# Patient Record
Sex: MALE | Race: WHITE | Employment: UNEMPLOYED | URBAN - NONMETROPOLITAN AREA
[De-identification: names, ages, dates, MRNs, and addresses within clinical notes are randomized per-mention and may not be internally consistent; named-entity substitution may affect disease eponyms.]

---

## 2017-09-19 ENCOUNTER — HOSPITAL ENCOUNTER (OUTPATIENT)
Dept: LAB | Age: 62
Discharge: HOME | End: 2017-09-19
Attending: NURSE PRACTITIONER | Admitting: NURSE PRACTITIONER

## 2017-09-19 LAB
ALT SERPL-CCNC: 12 U/L (ref 10–40)
ANION GAP SERPL CALCULATED.3IONS-SCNC: 12 MMOL/L (ref 4–16)
AST SERPL-CCNC: 15 IU/L (ref 15–37)
BASOPHILS ABSOLUTE: 0 K/CU MM
BASOPHILS RELATIVE PERCENT: 0.2 % (ref 0–1)
BUN BLDV-MCNC: 13 MG/DL (ref 6–23)
CALCIUM SERPL-MCNC: 8.9 MG/DL (ref 8.3–10.6)
CHLORIDE BLD-SCNC: 107 MMOL/L (ref 99–110)
CHOLESTEROL, FASTING: 144 MG/DL
CO2: 24 MMOL/L (ref 21–32)
CREAT SERPL-MCNC: 1.2 MG/DL (ref 0.9–1.3)
DIFFERENTIAL TYPE: ABNORMAL
EOSINOPHILS ABSOLUTE: 0.2 K/CU MM
EOSINOPHILS RELATIVE PERCENT: 2.6 % (ref 0–3)
ESTIMATED AVERAGE GLUCOSE: 105 MG/DL
GFR AFRICAN AMERICAN: >60 ML/MIN/1.73M2
GFR NON-AFRICAN AMERICAN: >60 ML/MIN/1.73M2
GLUCOSE FASTING: 97 MG/DL (ref 70–99)
HBA1C MFR BLD: 5.3 % (ref 4.2–6.3)
HCT VFR BLD CALC: 47.6 % (ref 42–52)
HDLC SERPL-MCNC: 26 MG/DL
HEMOGLOBIN: 14.9 GM/DL (ref 13.5–18)
IMMATURE NEUTROPHIL %: 0.4 % (ref 0–0.43)
LDL CHOLESTEROL DIRECT: 87 MG/DL
LYMPHOCYTES ABSOLUTE: 2.3 K/CU MM
LYMPHOCYTES RELATIVE PERCENT: 27.8 % (ref 24–44)
MCH RBC QN AUTO: 30.7 PG (ref 27–31)
MCHC RBC AUTO-ENTMCNC: 31.3 % (ref 32–36)
MCV RBC AUTO: 97.9 FL (ref 78–100)
MONOCYTES ABSOLUTE: 0.6 K/CU MM
MONOCYTES RELATIVE PERCENT: 6.7 % (ref 0–4)
PDW BLD-RTO: 14.6 % (ref 11.7–14.9)
PLATELET # BLD: 222 K/CU MM (ref 140–440)
PMV BLD AUTO: 10.4 FL (ref 7.5–11.1)
POTASSIUM SERPL-SCNC: 4.2 MMOL/L (ref 3.5–5.1)
RBC # BLD: 4.86 M/CU MM (ref 4.6–6.2)
SEGMENTED NEUTROPHILS ABSOLUTE COUNT: 5.2 K/CU MM
SEGMENTED NEUTROPHILS RELATIVE PERCENT: 62.3 % (ref 36–66)
SODIUM BLD-SCNC: 143 MMOL/L (ref 135–145)
TOTAL IMMATURE NEUTOROPHIL: 0.03 K/CU MM
TRIGLYCERIDE, FASTING: 182 MG/DL
TSH HIGH SENSITIVITY: 2.2 UIU/ML (ref 0.27–4.2)
VITAMIN B-12: 345.4 PG/ML (ref 211–911)
WBC # BLD: 8.3 K/CU MM (ref 4–10.5)

## 2017-09-20 LAB — TESTOSTERONE TOTAL-MALE: 323

## 2017-10-30 ENCOUNTER — OFFICE VISIT (OUTPATIENT)
Dept: FAMILY MEDICINE CLINIC | Age: 62
End: 2017-10-30

## 2017-10-30 VITALS
HEIGHT: 71 IN | BODY MASS INDEX: 29.26 KG/M2 | HEART RATE: 69 BPM | SYSTOLIC BLOOD PRESSURE: 138 MMHG | RESPIRATION RATE: 16 BRPM | OXYGEN SATURATION: 98 % | WEIGHT: 209 LBS | DIASTOLIC BLOOD PRESSURE: 88 MMHG

## 2017-10-30 DIAGNOSIS — Z12.11 SCREENING FOR COLON CANCER: ICD-10-CM

## 2017-10-30 DIAGNOSIS — I10 ESSENTIAL HYPERTENSION: Primary | ICD-10-CM

## 2017-10-30 DIAGNOSIS — R60.0 LEG EDEMA, LEFT: ICD-10-CM

## 2017-10-30 DIAGNOSIS — J44.9 CHRONIC OBSTRUCTIVE PULMONARY DISEASE, UNSPECIFIED COPD TYPE (HCC): ICD-10-CM

## 2017-10-30 DIAGNOSIS — F09 COGNITIVE DYSFUNCTION: ICD-10-CM

## 2017-10-30 DIAGNOSIS — R79.89 LOW TESTOSTERONE IN MALE: ICD-10-CM

## 2017-10-30 DIAGNOSIS — F07.81 POST CONCUSSION SYNDROME: ICD-10-CM

## 2017-10-30 PROCEDURE — 99204 OFFICE O/P NEW MOD 45 MIN: CPT | Performed by: FAMILY MEDICINE

## 2017-10-30 RX ORDER — CLOPIDOGREL BISULFATE 75 MG/1
75 TABLET ORAL DAILY
COMMUNITY
End: 2017-11-08 | Stop reason: SDUPTHER

## 2017-10-30 RX ORDER — BUPROPION HYDROCHLORIDE 150 MG/1
150 TABLET ORAL EVERY MORNING
COMMUNITY
End: 2018-07-10 | Stop reason: SDUPTHER

## 2017-10-30 RX ORDER — PRAVASTATIN SODIUM 80 MG/1
80 TABLET ORAL DAILY
COMMUNITY
End: 2018-10-22 | Stop reason: SDUPTHER

## 2017-10-30 RX ORDER — BUSPIRONE HYDROCHLORIDE 15 MG/1
15 TABLET ORAL 3 TIMES DAILY
COMMUNITY
End: 2018-10-22 | Stop reason: SDUPTHER

## 2017-10-30 RX ORDER — BUDESONIDE AND FORMOTEROL FUMARATE DIHYDRATE 160; 4.5 UG/1; UG/1
2 AEROSOL RESPIRATORY (INHALATION) 2 TIMES DAILY
COMMUNITY
End: 2018-10-22 | Stop reason: SDUPTHER

## 2017-10-30 RX ORDER — TESTOSTERONE 12.5 MG/1.25G
5 GEL TOPICAL DAILY
COMMUNITY
End: 2018-01-02

## 2017-10-30 RX ORDER — GABAPENTIN 300 MG/1
300 CAPSULE ORAL 3 TIMES DAILY
COMMUNITY
End: 2018-10-22 | Stop reason: SDUPTHER

## 2017-10-30 RX ORDER — FLUTICASONE PROPIONATE 50 MCG
1 SPRAY, SUSPENSION (ML) NASAL DAILY
COMMUNITY
End: 2018-05-30 | Stop reason: SDUPTHER

## 2017-10-30 ASSESSMENT — ENCOUNTER SYMPTOMS
SORE THROAT: 0
RHINORRHEA: 0
SHORTNESS OF BREATH: 0
DIARRHEA: 0
COUGH: 0
ALLERGIC/IMMUNOLOGIC NEGATIVE: 1
NAUSEA: 0
CHEST TIGHTNESS: 0
SINUS PRESSURE: 0
WHEEZING: 0
BLOOD IN STOOL: 0
BACK PAIN: 0
VOMITING: 0
ABDOMINAL PAIN: 0
CONSTIPATION: 0

## 2017-10-30 ASSESSMENT — PATIENT HEALTH QUESTIONNAIRE - PHQ9
SUM OF ALL RESPONSES TO PHQ9 QUESTIONS 1 & 2: 0
1. LITTLE INTEREST OR PLEASURE IN DOING THINGS: 0
2. FEELING DOWN, DEPRESSED OR HOPELESS: 0
SUM OF ALL RESPONSES TO PHQ QUESTIONS 1-9: 0

## 2017-10-30 NOTE — ASSESSMENT & PLAN NOTE
Chronic left leg edema post what appears to have been fem-pop surgery.   This was done at [facility] in approximately 2013

## 2017-10-30 NOTE — ASSESSMENT & PLAN NOTE
/88   Pulse 69   Resp 16   Ht 5' 10.5\" (1.791 m)   Wt 209 lb (94.8 kg)   SpO2 98%   BMI 29.56 kg/m²

## 2017-10-30 NOTE — ASSESSMENT & PLAN NOTE
Patient has documented low testosterone for which she is on testosterone supplement.   He has low sexual desire which is interfering with his relationship with his wife
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
## 2017-11-08 RX ORDER — CLOPIDOGREL BISULFATE 75 MG/1
75 TABLET ORAL DAILY
Qty: 90 TABLET | Refills: 3 | Status: SHIPPED | OUTPATIENT
Start: 2017-11-08 | End: 2018-01-17 | Stop reason: SDUPTHER

## 2018-01-02 ENCOUNTER — OFFICE VISIT (OUTPATIENT)
Dept: FAMILY MEDICINE CLINIC | Age: 63
End: 2018-01-02

## 2018-01-02 VITALS
WEIGHT: 218.6 LBS | BODY MASS INDEX: 30.92 KG/M2 | DIASTOLIC BLOOD PRESSURE: 86 MMHG | HEART RATE: 77 BPM | RESPIRATION RATE: 16 BRPM | OXYGEN SATURATION: 98 % | SYSTOLIC BLOOD PRESSURE: 126 MMHG

## 2018-01-02 DIAGNOSIS — Z11.4 ENCOUNTER FOR SCREENING FOR HIV: ICD-10-CM

## 2018-01-02 DIAGNOSIS — Z11.59 ENCOUNTER FOR HEPATITIS C SCREENING TEST FOR LOW RISK PATIENT: ICD-10-CM

## 2018-01-02 DIAGNOSIS — I10 ESSENTIAL HYPERTENSION: Primary | ICD-10-CM

## 2018-01-02 DIAGNOSIS — F17.200 HAS BEEN SMOKING TOBACCO FOR 30 YEARS OR MORE: ICD-10-CM

## 2018-01-02 DIAGNOSIS — F07.81 POST CONCUSSION SYNDROME: ICD-10-CM

## 2018-01-02 DIAGNOSIS — R79.89 LOW TESTOSTERONE IN MALE: ICD-10-CM

## 2018-01-02 DIAGNOSIS — F43.10 PTSD (POST-TRAUMATIC STRESS DISORDER): ICD-10-CM

## 2018-01-02 DIAGNOSIS — R60.0 LEG EDEMA, LEFT: ICD-10-CM

## 2018-01-02 DIAGNOSIS — Z12.11 SCREENING FOR COLON CANCER: ICD-10-CM

## 2018-01-02 DIAGNOSIS — Z98.890 HISTORY OF VASCULAR SURGERY: ICD-10-CM

## 2018-01-02 DIAGNOSIS — F51.01 PRIMARY INSOMNIA: ICD-10-CM

## 2018-01-02 DIAGNOSIS — I10 ESSENTIAL HYPERTENSION: ICD-10-CM

## 2018-01-02 DIAGNOSIS — Z12.2 ENCOUNTER FOR SCREENING FOR LUNG CANCER: ICD-10-CM

## 2018-01-02 DIAGNOSIS — J44.9 CHRONIC OBSTRUCTIVE PULMONARY DISEASE, UNSPECIFIED COPD TYPE (HCC): ICD-10-CM

## 2018-01-02 LAB
A/G RATIO: 1.8 (ref 1.1–2.2)
ALBUMIN SERPL-MCNC: 4.4 G/DL (ref 3.4–5)
ALP BLD-CCNC: 69 U/L (ref 40–129)
ALT SERPL-CCNC: 17 U/L (ref 10–40)
ANION GAP SERPL CALCULATED.3IONS-SCNC: 13 MMOL/L (ref 3–16)
AST SERPL-CCNC: 16 U/L (ref 15–37)
BILIRUB SERPL-MCNC: 0.5 MG/DL (ref 0–1)
BUN BLDV-MCNC: 12 MG/DL (ref 7–20)
CALCIUM SERPL-MCNC: 9.7 MG/DL (ref 8.3–10.6)
CHLORIDE BLD-SCNC: 104 MMOL/L (ref 99–110)
CO2: 26 MMOL/L (ref 21–32)
CREAT SERPL-MCNC: 1 MG/DL (ref 0.8–1.3)
GFR AFRICAN AMERICAN: >60
GFR NON-AFRICAN AMERICAN: >60
GLOBULIN: 2.5 G/DL
GLUCOSE BLD-MCNC: 80 MG/DL (ref 70–99)
HEPATITIS C ANTIBODY INTERPRETATION: NORMAL
POTASSIUM SERPL-SCNC: 4.7 MMOL/L (ref 3.5–5.1)
SODIUM BLD-SCNC: 143 MMOL/L (ref 136–145)
TOTAL PROTEIN: 6.9 G/DL (ref 6.4–8.2)

## 2018-01-02 PROCEDURE — 99214 OFFICE O/P EST MOD 30 MIN: CPT | Performed by: FAMILY MEDICINE

## 2018-01-02 RX ORDER — DOXAZOSIN 2 MG/1
2 TABLET ORAL DAILY
Qty: 30 TABLET | Refills: 3 | Status: SHIPPED | OUTPATIENT
Start: 2018-01-02 | End: 2018-04-03 | Stop reason: SDUPTHER

## 2018-01-02 ASSESSMENT — ENCOUNTER SYMPTOMS
DIARRHEA: 0
VOMITING: 0
SINUS PRESSURE: 0
SORE THROAT: 0
CONSTIPATION: 0
BACK PAIN: 0
ALLERGIC/IMMUNOLOGIC NEGATIVE: 1
SHORTNESS OF BREATH: 0
BLOOD IN STOOL: 0
NAUSEA: 0
CHEST TIGHTNESS: 0
RHINORRHEA: 0
WHEEZING: 0
COUGH: 0
ABDOMINAL PAIN: 0

## 2018-01-02 NOTE — ASSESSMENT & PLAN NOTE
/86 (Site: Left Arm, Position: Sitting, Cuff Size: Medium Adult)   Pulse 77   Resp 16   Wt 218 lb 9.6 oz (99.2 kg)   SpO2 98%   BMI 30.92 kg/m²   Blood pressure well controlled
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
## 2018-01-02 NOTE — ASSESSMENT & PLAN NOTE
Patient was on testosterone patches but her that it the company is being sued for people having access heart attacks.   I pointed out that I had discussed this with him his last visit but he showed no concern about it but apparently the ads on TV by an  suing got his attention
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
## 2018-01-02 NOTE — ASSESSMENT & PLAN NOTE
Left leg edema post vascular grafting. Patient was following in [place] with [facility] but has lost contact with them and is requesting vascular surgery closer to home.   He will be set up down in [facility]

## 2018-01-02 NOTE — ASSESSMENT & PLAN NOTE
Patient is been smoking for close to 50 years a pack and half a day for much of that time so he has a greater than 60-pack-year history.   We will schedule a diagnostic low-dose CT chest as is currently recommended

## 2018-01-03 LAB — HIV-1 AND HIV-2 ANTIBODIES: NORMAL

## 2018-01-17 RX ORDER — CLOPIDOGREL BISULFATE 75 MG/1
75 TABLET ORAL DAILY
Qty: 90 TABLET | Refills: 3 | Status: SHIPPED | OUTPATIENT
Start: 2018-01-17 | End: 2018-10-22 | Stop reason: SDUPTHER

## 2018-02-05 PROBLEM — I73.9 PAD (PERIPHERAL ARTERY DISEASE) (HCC): Status: ACTIVE | Noted: 2018-02-05

## 2018-02-07 ENCOUNTER — HOSPITAL ENCOUNTER (OUTPATIENT)
Dept: WOUND CARE | Age: 63
Discharge: HOME | End: 2018-02-07
Attending: NURSE PRACTITIONER | Admitting: NURSE PRACTITIONER

## 2018-02-07 VITALS
WEIGHT: 223 LBS | TEMPERATURE: 97.6 F | BODY MASS INDEX: 31.92 KG/M2 | HEART RATE: 85 BPM | SYSTOLIC BLOOD PRESSURE: 130 MMHG | DIASTOLIC BLOOD PRESSURE: 74 MMHG | HEIGHT: 70 IN | RESPIRATION RATE: 18 BRPM

## 2018-02-07 DIAGNOSIS — I73.9 PAD (PERIPHERAL ARTERY DISEASE) (HCC): Primary | ICD-10-CM

## 2018-02-07 DIAGNOSIS — L97.922 CHRONIC ULCER OF LEFT LOWER EXTREMITY WITH FAT LAYER EXPOSED (HCC): ICD-10-CM

## 2018-02-07 DIAGNOSIS — I87.332 CHRONIC VENOUS HYPERTENSION (IDIOPATHIC) WITH ULCER AND INFLAMMATION OF LEFT LOWER EXTREMITY (HCC): ICD-10-CM

## 2018-02-07 DIAGNOSIS — L97.929 CHRONIC VENOUS HYPERTENSION (IDIOPATHIC) WITH ULCER AND INFLAMMATION OF LEFT LOWER EXTREMITY (HCC): ICD-10-CM

## 2018-02-07 PROCEDURE — 11042 DBRDMT SUBQ TIS 1ST 20SQCM/<: CPT | Performed by: NURSE PRACTITIONER

## 2018-02-07 PROCEDURE — 99203 OFFICE O/P NEW LOW 30 MIN: CPT | Performed by: NURSE PRACTITIONER

## 2018-02-07 ASSESSMENT — PAIN SCALES - GENERAL: PAINLEVEL_OUTOF10: 0

## 2018-02-07 NOTE — PROGRESS NOTES
(post-traumatic stress disorder)    Has been smoking tobacco for 30 years or more    PAD (peripheral artery disease) ([facility]    WD-Chronic ulcer of left lower extremity with fat layer exposed ([facility]    WD-Chronic venous hypertension (idiopathic) with ulcer and inflammation of left lower extremity (HCC)       REVIEW OF SYSTEMS    Constitutional: negative for chills, fatigue, fevers and malaise  Respiratory: negative for cough and shortness of breath  Cardiovascular: negative for chest pain and chest pressure/discomfort, negative for calf pain  Integument/breast: positive for skin lesion(s)  Neurological: negative for dizziness and headaches      Objective:      /74   Pulse 85   Temp 97.6 °F (36.4 °C) (Temporal)   Resp 18   Ht 5' 10\" (1.778 m)   Wt 223 lb (101.2 kg)   BMI 32.00 kg/m²     PHYSICAL EXAM  General Appearance: alert and oriented to person, place and time, well-developed and well-nourished, in no acute distress  Pulmonary/Chest: clear to auscultation bilaterally- no wheezes, rales or rhonchi, normal air movement, no respiratory distress  Cardiovascular: normal rate, normal S1 and S2 and intact distal pulses  Extremities: no cyanosis, no clubbing, Da's sign negative bilaterally and 2 + edema-  left lower leg  Dermatologic exam: Visual inspection of the periwound reveals the skin to be edematous  Wound exam: see wound description below in procedure note      Assessment:       Raciel Martini  appears to have a non-healing wound of the left lower leg. The etiology of the wound is felt to be venous and arterial. There are multiple complicating factors including edema, venous stasis and arterial insufficiency. A comprehensive wound management program would be helpful to heal this wound. Assessments completed include fall risk and nutritional, functional,and psychological status. At this time appropriate care would include: periodic debridement and wound care as below.      Problem List Items 2/7/2018  8:45 AM   Wound Assessment Red;Yellow 2/7/2018  8:45 AM   Drainage Amount Small 2/7/2018  8:45 AM   Drainage Description Serosanguinous 2/7/2018  8:45 AM   Odor None 2/7/2018  8:45 AM   Margins Defined edges 2/7/2018  8:45 AM   Maria Eugenia-wound Assessment Swelling;Red 2/7/2018  8:45 AM   Non-staged Wound Description Full thickness 2/7/2018  8:45 AM   [place]%Wound Bed 0 2/7/2018  8:45 AM   Red%Wound Bed 50 2/7/2018  8:45 AM   Yellow%Wound Bed 50 2/7/2018  8:45 AM   Black%Wound Bed 0 2/7/2018  8:45 AM   Purple%Wound Bed 0 2/7/2018  8:45 AM   Other%Wound Bed 0 2/7/2018  8:45 AM   Debridement per physician Subcutaneous 2/7/2018  9:15 AM   Number of days: 0       Percent of Wound(s) Debrided: 100%    Total  Area  Debrided:  5.28 sq cm     Bleeding:  None    Hemostasis Achieved:  not needed    Procedural Pain:  0  / 10     Post Procedural Pain:  0 / 10     Response to treatment:  Well tolerated by patient. Plan:     Discharge instructions:  PHYSICIAN ORDERS AND DISCHARGE INSTRUCTIONS    NOTE: Upon discharge from the [address], you will receive a patient experience survey. We would be grateful if you would take the time to fill this survey out.     Wound care order history:     JUAN CARLOS's: SHIELA AT THIS TIME D/T ARTERIAL DISEASE           Right       Left                                     Date    Vascular studies: ARTERIAL STUDIES ORDERED BY DR. Jeet Tiwari ON 2/5/2018, AWAITING APPOINTMENT  Date    Imaging:   Date    Cultures:  WOUND CULTURE LEFT MEDIAL LEG WOUND OBTAINED                                   Date: 2/7/2018   Labs/ HbA1c:   5.3 ON 9/17/2017                                             Date    Grafts:                                   Date    HBO:     Antibiotics:               Earlier Wound care treatments:                Authorizations:      Consults:   Date     Primary care physician:     Continuing wound care orders and information:              Residence:                Continue home health care with:

## 2018-02-07 NOTE — PLAN OF CARE
Problem: Arterial:  Goal: Optimize blood flow for wound healing  Optimize blood flow for wound healing   Outcome: Ongoing  Arterial studies ordered by Dr. Migel Christina

## 2018-02-11 LAB
CULTURE: NORMAL
ORGANISM: NORMAL
REPORT STATUS: NORMAL
REQUEST PROBLEM: NORMAL
SPECIMEN: NORMAL

## 2018-02-14 ENCOUNTER — HOSPITAL ENCOUNTER (OUTPATIENT)
Dept: GENERAL RADIOLOGY | Age: 63
Discharge: HOME | End: 2018-02-14
Attending: NURSE PRACTITIONER | Admitting: NURSE PRACTITIONER

## 2018-02-14 ENCOUNTER — HOSPITAL ENCOUNTER (OUTPATIENT)
Dept: WOUND CARE | Age: 63
Discharge: HOME | End: 2018-02-14
Attending: NURSE PRACTITIONER | Admitting: NURSE PRACTITIONER

## 2018-02-14 VITALS
DIASTOLIC BLOOD PRESSURE: 68 MMHG | HEART RATE: 79 BPM | RESPIRATION RATE: 17 BRPM | TEMPERATURE: 97.6 F | SYSTOLIC BLOOD PRESSURE: 107 MMHG

## 2018-02-14 DIAGNOSIS — L97.922 LEG ULCER, LEFT, WITH FAT LAYER EXPOSED (HCC): ICD-10-CM

## 2018-02-14 DIAGNOSIS — T81.89XD NONHEALING SURGICAL WOUND, SUBSEQUENT ENCOUNTER: ICD-10-CM

## 2018-02-14 DIAGNOSIS — I73.9 PAD (PERIPHERAL ARTERY DISEASE) (HCC): ICD-10-CM

## 2018-02-14 DIAGNOSIS — I87.332 CHRONIC VENOUS HTN W ULCER AND INFLAMMATION OF L LOW EXTREM (HCC): ICD-10-CM

## 2018-02-14 DIAGNOSIS — L97.922 CHRONIC ULCER OF LEFT LOWER EXTREMITY WITH FAT LAYER EXPOSED (HCC): ICD-10-CM

## 2018-02-14 DIAGNOSIS — L97.929 CHRONIC VENOUS HYPERTENSION (IDIOPATHIC) WITH ULCER AND INFLAMMATION OF LEFT LOWER EXTREMITY (HCC): Primary | ICD-10-CM

## 2018-02-14 DIAGNOSIS — I87.332 CHRONIC VENOUS HYPERTENSION (IDIOPATHIC) WITH ULCER AND INFLAMMATION OF LEFT LOWER EXTREMITY (HCC): Primary | ICD-10-CM

## 2018-02-14 DIAGNOSIS — L97.929 CHRONIC VENOUS HTN W ULCER AND INFLAMMATION OF L LOW EXTREM (HCC): ICD-10-CM

## 2018-02-14 PROCEDURE — 11042 DBRDMT SUBQ TIS 1ST 20SQCM/<: CPT | Performed by: NURSE PRACTITIONER

## 2018-02-14 RX ORDER — LIDOCAINE HYDROCHLORIDE 40 MG/ML
SOLUTION TOPICAL ONCE
Status: DISCONTINUED | OUTPATIENT
Start: 2018-02-14 | End: 2018-02-15 | Stop reason: HOSPADM

## 2018-02-14 ASSESSMENT — PAIN DESCRIPTION - DESCRIPTORS: DESCRIPTORS: THROBBING;TIGHTNESS

## 2018-02-14 ASSESSMENT — PAIN DESCRIPTION - PAIN TYPE: TYPE: CHRONIC PAIN

## 2018-02-14 ASSESSMENT — PAIN DESCRIPTION - ORIENTATION: ORIENTATION: LEFT

## 2018-02-14 ASSESSMENT — PAIN DESCRIPTION - LOCATION: LOCATION: LEG

## 2018-02-14 ASSESSMENT — PAIN DESCRIPTION - FREQUENCY: FREQUENCY: INTERMITTENT

## 2018-02-14 ASSESSMENT — PAIN DESCRIPTION - ONSET: ONSET: ON-GOING

## 2018-02-14 ASSESSMENT — PAIN SCALES - GENERAL: PAINLEVEL_OUTOF10: 5

## 2018-02-14 ASSESSMENT — PAIN DESCRIPTION - PROGRESSION: CLINICAL_PROGRESSION: NOT CHANGED

## 2018-02-14 NOTE — PROGRESS NOTES
Santyl, fibacol, 4x4, conform and tape applied to Left medial lower leg.
Measurements:  Wound 02/07/18 #1 LEFT MEDIAL LOWER LEG ARTERIAL ULCER (ONSET 2 WEEKS) (Active)   Wound Image   2/7/2018  8:45 AM   Wound Type Wound 2/7/2018  8:45 AM   Dressing Status Clean;Dry; Intact 2/14/2018 10:40 AM   Dressing Changed Changed/New 2/14/2018 10:40 AM   Wound Cleansed Rinsed/Irrigated with saline 2/14/2018  9:34 AM   Wound Length (cm) 3.4 cm 2/14/2018 10:10 AM   Wound Width (cm) 2.5 cm 2/14/2018 10:10 AM   Wound Depth (cm)  0.2 2/14/2018 10:10 AM   Calculated Wound Size (cm^2) (l*w) 8.5 cm^2 2/14/2018 10:10 AM   Change in Wound Size % (l*w) -60.98 2/14/2018 10:10 AM   Distance Tunneling (cm) 0 cm 2/14/2018  9:34 AM   Tunneling Position ___ O'Clock 0 2/14/2018  9:34 AM   Undermining Starts ___ O'Clock 0 2/14/2018  9:34 AM   Undermining Ends___ O'Clock 0 2/14/2018  9:34 AM   Undermining Maxium Distance (cm) 0 2/14/2018  9:34 AM   Wound Assessment Red;Yellow 2/14/2018  9:34 AM   Drainage Amount Moderate 2/14/2018  9:34 AM   Drainage Description Serosanguinous 2/14/2018  9:34 AM   Odor None 2/14/2018  9:34 AM   Margins Defined edges 2/14/2018  9:34 AM   Maria Eugenia-wound Assessment Swelling;Red 2/14/2018  9:34 AM   Non-staged Wound Description Full thickness 2/14/2018  9:34 AM   [place]%Wound Bed 0 2/14/2018  9:34 AM   Red%Wound Bed 10 2/14/2018  9:34 AM   Yellow%Wound Bed 90 2/14/2018  9:34 AM   Black%Wound Bed 0 2/14/2018  9:34 AM   Purple%Wound Bed 0 2/14/2018  9:34 AM   Other%Wound Bed 0 2/14/2018  9:34 AM   Debridement per physician Subcutaneous 2/14/2018 10:10 AM   Number of days: 7       Percent of Wound(s) Debrided: approximately 10%    Total  Area  Debrided:  0.8 sq cm     Bleeding:  None    Hemostasis Achieved:  not needed    Procedural Pain:  2  / 10     Post Procedural Pain:  1 / 10     Response to treatment:  Well tolerated by patient. Status of wound progress and description from last visit:   Worse with visible metal in the wound bed. Left leg continues to be swollen, red, and painful.  Patient

## 2018-02-14 NOTE — PLAN OF CARE
Problem: Pain:  Intervention: Opioid analgesia side-effects  See Flowsheet  Intervention: Assess barriers to pain control  See Flowsheet  Intervention: Promote participation in pain management plan  See Flowsheet    Goal: Pain level will decrease  Pain level will decrease   Outcome: Ongoing  See Flowsheet  Goal: Control of acute pain  Control of acute pain   Outcome: Ongoing  See Flowsheet  Goal: Control of chronic pain  Control of chronic pain   Outcome: Ongoing  See Flowsheet    Problem: Wound:  Intervention: Assess wound size, appearance and drainage  See Flowsheet  Intervention: Assess pedal pulses bilaterally if patient has a foot or leg ulcer  See Flowsheet    Goal: Will show signs of wound healing; wound closure and no evidence of infection  Will show signs of wound healing; wound closure and no evidence of infection   Outcome: Ongoing  See Flowsheet

## 2018-02-20 ENCOUNTER — HOSPITAL ENCOUNTER (OUTPATIENT)
Dept: WOUND CARE | Age: 63
Discharge: HOME | End: 2018-02-20
Attending: SURGERY | Admitting: SURGERY

## 2018-02-20 ENCOUNTER — HOSPITAL ENCOUNTER (OUTPATIENT)
Dept: GENERAL RADIOLOGY | Age: 63
Discharge: HOME | End: 2018-02-20
Attending: FAMILY MEDICINE | Admitting: FAMILY MEDICINE

## 2018-02-20 ENCOUNTER — HOSPITAL ENCOUNTER (OUTPATIENT)
Dept: CT IMAGING | Age: 63
Discharge: HOME | End: 2018-02-20
Attending: FAMILY MEDICINE | Admitting: FAMILY MEDICINE

## 2018-02-20 VITALS
DIASTOLIC BLOOD PRESSURE: 76 MMHG | SYSTOLIC BLOOD PRESSURE: 107 MMHG | TEMPERATURE: 97 F | RESPIRATION RATE: 16 BRPM | HEART RATE: 80 BPM

## 2018-02-20 DIAGNOSIS — Z12.2 ENCOUNTER FOR SCREENING FOR LUNG CANCER: ICD-10-CM

## 2018-02-20 DIAGNOSIS — L97.922 CHRONIC ULCER OF LEFT LOWER EXTREMITY WITH FAT LAYER EXPOSED (HCC): Primary | ICD-10-CM

## 2018-02-20 DIAGNOSIS — Z12.2 ENCOUNTER FOR SCREENING FOR MALIGNANT NEOPLASM OF RESPIRATORY ORGANS: ICD-10-CM

## 2018-02-20 DIAGNOSIS — F17.200 TOBACCO USE DISORDER: ICD-10-CM

## 2018-02-20 DIAGNOSIS — F17.200 NICOTINE DEPENDENCE, UNCOMPLICATED: ICD-10-CM

## 2018-02-20 RX ORDER — LISINOPRIL 20 MG/1
20 TABLET ORAL DAILY
COMMUNITY
End: 2018-04-03 | Stop reason: ALTCHOICE

## 2018-02-20 ASSESSMENT — PAIN DESCRIPTION - ONSET: ONSET: ON-GOING

## 2018-02-20 ASSESSMENT — PAIN SCALES - GENERAL: PAINLEVEL_OUTOF10: 5

## 2018-02-20 ASSESSMENT — PAIN DESCRIPTION - ORIENTATION: ORIENTATION: LEFT

## 2018-02-20 ASSESSMENT — PAIN DESCRIPTION - LOCATION: LOCATION: LEG

## 2018-02-20 ASSESSMENT — PAIN DESCRIPTION - FREQUENCY: FREQUENCY: INTERMITTENT

## 2018-02-20 ASSESSMENT — PAIN DESCRIPTION - PROGRESSION: CLINICAL_PROGRESSION: NOT CHANGED

## 2018-02-20 ASSESSMENT — PAIN DESCRIPTION - PAIN TYPE: TYPE: CHRONIC PAIN

## 2018-02-20 ASSESSMENT — PAIN DESCRIPTION - DESCRIPTORS: DESCRIPTORS: THROBBING

## 2018-02-20 NOTE — PLAN OF CARE
Problem: Wound:  Intervention: Assess wound size, appearance and drainage  See flowsheet  Intervention: Assess pedal pulses bilaterally if patient has a foot or leg ulcer  See flowsheet    Goal: Will show signs of wound healing; wound closure and no evidence of infection  Will show signs of wound healing; wound closure and no evidence of infection   Outcome: Ongoing

## 2018-02-20 NOTE — PROGRESS NOTES
tubi F)    Written Patient Dismissal Instructions Given            Electronically signed by Sarah Pan MD on 2/20/2018 at 11:28 AM

## 2018-02-27 ENCOUNTER — HOSPITAL ENCOUNTER (OUTPATIENT)
Dept: WOUND CARE | Age: 63
Discharge: HOME | End: 2018-02-27
Attending: SURGERY | Admitting: SURGERY

## 2018-02-27 VITALS
HEART RATE: 74 BPM | SYSTOLIC BLOOD PRESSURE: 127 MMHG | TEMPERATURE: 97.6 F | RESPIRATION RATE: 16 BRPM | DIASTOLIC BLOOD PRESSURE: 80 MMHG

## 2018-02-27 DIAGNOSIS — I73.9 PAD (PERIPHERAL ARTERY DISEASE) (HCC): ICD-10-CM

## 2018-02-27 DIAGNOSIS — L97.922 CHRONIC ULCER OF LEFT LOWER EXTREMITY WITH FAT LAYER EXPOSED (HCC): Primary | ICD-10-CM

## 2018-02-27 NOTE — PROGRESS NOTES
[Hydrocodone-Acetaminophen] Itching       MEDICATIONS    Current Outpatient Prescriptions on File Prior to Encounter   Medication Sig Dispense Refill    lisinopril (PRINIVIL;ZESTRIL) 20 MG tablet Take 20 mg by mouth daily      mupirocin (BACTROBAN) 2 % ointment Apply 3 times daily. 1 Tube 1    collagenase 250 UNIT/GM ointment Apply topically daily Apply topically daily.  clopidogrel (PLAVIX) 75 MG tablet Take 1 tablet by mouth daily 90 tablet 3    doxazosin (CARDURA) 2 MG tablet Take 1 tablet by mouth daily 30 tablet 3    tiotropium (SPIRIVA HANDIHALER) 18 MCG inhalation capsule Inhale 18 mcg into the lungs daily      pravastatin (PRAVACHOL) 80 MG tablet Take 80 mg by mouth daily      busPIRone (BUSPAR) 15 MG tablet Take 15 mg by mouth 3 times daily      budesonide-formoterol (SYMBICORT) 160-4.5 MCG/ACT AERO Inhale 2 puffs into the lungs 2 times daily      gabapentin (NEURONTIN) 300 MG capsule Take 300 mg by mouth 3 times daily      fluticasone (FLONASE) 50 MCG/ACT nasal spray 1 spray by Nasal route daily      buPROPion (WELLBUTRIN XL) 150 MG extended release tablet Take 150 mg by mouth every morning       No current facility-administered medications on file prior to encounter. REVIEW OF SYSTEMS    Pertinent items are noted in HPI. Constitutional: Negative for systemic symptoms including fever, chills and malaise. Objective:      /80   Pulse 74   Temp 97.6 °F (36.4 °C) (Temporal)   Resp 16     PHYSICAL EXAM      General: The patient is in no acute distress. Mental status:  Patient is appropriate, is  oriented to place and plan of care.   Dermatologic exam: Visual inspection of the periwound reveals the skin to be cool  and edematous  Wound exam: see wound description below in procedure note      Assessment:     Problem List Items Addressed This Visit     PAD (peripheral artery disease) ([facility] [address] [facility]    WD-Chronic ulcer of left lower extremity with fat layer exposed ([facility] [address] - Primary

## 2018-02-27 NOTE — PLAN OF CARE
Problem: Wound:  Intervention: Assess wound size, appearance and drainage  See flowsheet   Intervention: Assess pedal pulses bilaterally if patient has a foot or leg ulcer  See flowsheet     Goal: Will show signs of wound healing; wound closure and no evidence of infection  Will show signs of wound healing; wound closure and no evidence of infection   Outcome: Ongoing  See flowsheet

## 2018-03-01 ENCOUNTER — TELEPHONE (OUTPATIENT)
Dept: FAMILY MEDICINE CLINIC | Age: 63
End: 2018-03-01

## 2018-03-06 ENCOUNTER — HOSPITAL ENCOUNTER (OUTPATIENT)
Dept: WOUND CARE | Age: 63
Discharge: HOME | End: 2018-03-06
Attending: SURGERY | Admitting: NURSE PRACTITIONER

## 2018-03-06 VITALS
TEMPERATURE: 96.2 F | DIASTOLIC BLOOD PRESSURE: 67 MMHG | HEART RATE: 75 BPM | RESPIRATION RATE: 16 BRPM | SYSTOLIC BLOOD PRESSURE: 102 MMHG

## 2018-03-06 DIAGNOSIS — I87.332 CHRONIC VENOUS HYPERTENSION (IDIOPATHIC) WITH ULCER AND INFLAMMATION OF LEFT LOWER EXTREMITY (HCC): ICD-10-CM

## 2018-03-06 DIAGNOSIS — L97.929 CHRONIC VENOUS HYPERTENSION (IDIOPATHIC) WITH ULCER AND INFLAMMATION OF LEFT LOWER EXTREMITY (HCC): ICD-10-CM

## 2018-03-06 DIAGNOSIS — T81.89XD NONHEALING SURGICAL WOUND, SUBSEQUENT ENCOUNTER: Primary | ICD-10-CM

## 2018-03-06 DIAGNOSIS — L97.922 CHRONIC ULCER OF LEFT LOWER EXTREMITY WITH FAT LAYER EXPOSED (HCC): ICD-10-CM

## 2018-03-06 PROCEDURE — 11042 DBRDMT SUBQ TIS 1ST 20SQCM/<: CPT | Performed by: NURSE PRACTITIONER

## 2018-03-13 ENCOUNTER — HOSPITAL ENCOUNTER (OUTPATIENT)
Dept: WOUND CARE | Age: 63
Discharge: HOME | End: 2018-03-13
Attending: SURGERY | Admitting: SURGERY

## 2018-03-13 VITALS
RESPIRATION RATE: 16 BRPM | DIASTOLIC BLOOD PRESSURE: 59 MMHG | HEART RATE: 78 BPM | TEMPERATURE: 97.4 F | SYSTOLIC BLOOD PRESSURE: 90 MMHG

## 2018-03-13 DIAGNOSIS — L97.922 CHRONIC ULCER OF LEFT LOWER EXTREMITY WITH FAT LAYER EXPOSED (HCC): Primary | ICD-10-CM

## 2018-03-13 NOTE — PROGRESS NOTES
extent of previous healing. Performed by: Gene Bowser MD    Consent obtained: Yes    Time out taken:  Yes    Pain Control: None needed       Debridement:Excisional Debridement    Using #15 blade scalpel the wound(s) was/were sharply debrided down through and including the removal of subcutaneous tissue. Devitalized Tissue Debrided:  slough and exudate    Pre Debridement Measurements:  Are located in the Wound Documentation Flow Sheet    All active wounds listed below with today's date are evaluated  Wound(s)    debrided this date include # : 1     Post  Debridement Measurements:  Wound 02/07/18 #1 LEFT MEDIAL LOWER LEG ARTERIAL ULCER (ONSET 2 WEEKS) (Active)   Wound Image   2/20/2018  9:54 AM   Wound Type Wound 3/13/2018  9:59 AM   Wound Arterial 3/13/2018  9:59 AM   Dressing Status Clean;Dry; Intact 3/13/2018 10:47 AM   Dressing Changed Changed/New 3/13/2018 10:47 AM   Wound Cleansed Wound cleanser 3/13/2018  9:59 AM   Wound Length (cm) 4.1 cm 3/13/2018 10:41 AM   Wound Width (cm) 2.1 cm 3/13/2018 10:41 AM   Wound Depth (cm)  .2 3/13/2018 10:41 AM   Calculated Wound Size (cm^2) (l*w) 8.61 cm^2 3/13/2018 10:41 AM   Change in Wound Size % (l*w) -63.07 3/13/2018 10:41 AM   Distance Tunneling (cm) 0 cm 3/13/2018  9:59 AM   Tunneling Position ___ O'Clock 0 3/13/2018  9:59 AM   Undermining Starts ___ O'Clock 0 3/13/2018  9:59 AM   Undermining Ends___ O'Clock 0 3/13/2018  9:59 AM   Undermining Maxium Distance (cm) 0 3/13/2018  9:59 AM   Wound Assessment Red;Yellow 3/13/2018  9:59 AM   Drainage Amount Moderate 3/13/2018  9:59 AM   Drainage Description Serosanguinous 3/13/2018  9:59 AM   Odor None 3/13/2018  9:59 AM   Margins Defined edges 3/13/2018  9:59 AM   Maria Eugenia-wound Assessment Purple 3/13/2018  9:59 AM   Non-staged Wound Description Full thickness 3/13/2018  9:59 AM   [place]%Wound Bed 0 3/13/2018  9:59 AM   Red%Wound Bed 20 3/13/2018  9:59 AM   Yellow%Wound Bed 80 3/13/2018  9:59 AM   Black%Wound Bed 0 3/13/2018  9:59 AM   Purple%Wound Bed 0 3/13/2018  9:59 AM   Other%Wound Bed 0 3/13/2018  9:59 AM   Debridement per physician Subcutaneous 3/13/2018 10:41 AM   Number of days: 34       Percent of Wound(s) Debrided: approximately 50%    Total Surface Area Debrided:  4 sq cm     Bleeding:  Minimal    Hemostasis Achieved:  by pressure    Procedural Pain:  4  / 10     Post Procedural Pain:  0 / 10     Response to treatment:  Well tolerated by patient. Status of wound progress and description from last visit:   Moderately improved. Plan:       Discharge Instructions       PHYSICIAN ORDERS AND DISCHARGE INSTRUCTIONS     NOTE: Upon discharge from the [address], you will receive a patient experience survey. We would be grateful if you would take the time to fill this survey out.     Wound care order history:                 JUAN CARLOS's: SHIELA AT THIS TIME D/T ARTERIAL DISEASE           Right       Left                                              Date               Vascular studies: ARTERIAL STUDIES ORDERED BY DR. Ileana Grant ON 2/5/2018, AWAITING APPOINTMENT--not able to do currently due to pain in leg              Imaging: XRAY LEFT LEG ORDERED ON 2/14/2018--negative no osteo or metal--just vascular clips               Cultures:  WOUND CULTURE LEFT MEDIAL LEG WOUND OBTAINEDon 2/7/18              Reviewed with patient on 2/14/2018-positive multidrug resistant MRSA-sensitive to Doxycycline & prescribed                               Date:               Labs/ HbA1c:   5.3 ON 9/17/2017                                             Date               Grafts:                                   FB               HBO:                Antibiotics: 2/14/2018-Doxycycline 100mg twice daily for 10 days until completed              Earlier Wound care treatments:                Authorizations:                        Consults: 2/14/2018-REFERRED TO DR. James Bain [facility] physician:      Continuing wound care orders

## 2018-03-20 ENCOUNTER — HOSPITAL ENCOUNTER (OUTPATIENT)
Dept: WOUND CARE | Age: 63
Discharge: HOME | End: 2018-04-18
Attending: SURGERY | Admitting: SURGERY

## 2018-03-21 ENCOUNTER — TELEPHONE (OUTPATIENT)
Dept: FAMILY MEDICINE CLINIC | Age: 63
End: 2018-03-21

## 2018-03-21 DIAGNOSIS — Z12.11 SCREENING FOR COLON CANCER: ICD-10-CM

## 2018-03-21 LAB
CONTROL: PRESENT
HEMOCCULT STL QL: NEGATIVE

## 2018-03-22 ENCOUNTER — HOSPITAL ENCOUNTER (OUTPATIENT)
Dept: WOUND CARE | Age: 63
Discharge: HOME | End: 2018-03-22
Attending: INTERNAL MEDICINE | Admitting: INTERNAL MEDICINE

## 2018-03-22 VITALS
SYSTOLIC BLOOD PRESSURE: 124 MMHG | DIASTOLIC BLOOD PRESSURE: 80 MMHG | TEMPERATURE: 97.6 F | RESPIRATION RATE: 16 BRPM | HEART RATE: 71 BPM

## 2018-03-22 DIAGNOSIS — L97.922 CHRONIC ULCER OF LEFT LOWER EXTREMITY WITH FAT LAYER EXPOSED (HCC): Primary | ICD-10-CM

## 2018-03-22 DIAGNOSIS — I87.332 CHRONIC VENOUS HYPERTENSION (IDIOPATHIC) WITH ULCER AND INFLAMMATION OF LEFT LOWER EXTREMITY (HCC): ICD-10-CM

## 2018-03-22 DIAGNOSIS — L97.929 CHRONIC VENOUS HYPERTENSION (IDIOPATHIC) WITH ULCER AND INFLAMMATION OF LEFT LOWER EXTREMITY (HCC): ICD-10-CM

## 2018-03-22 RX ORDER — LIDOCAINE HYDROCHLORIDE 40 MG/ML
SOLUTION TOPICAL ONCE
Status: DISCONTINUED | OUTPATIENT
Start: 2018-03-22 | End: 2018-03-23 | Stop reason: HOSPADM

## 2018-03-22 ASSESSMENT — PAIN DESCRIPTION - PAIN TYPE: TYPE: CHRONIC PAIN

## 2018-03-22 ASSESSMENT — PAIN SCALES - GENERAL: PAINLEVEL_OUTOF10: 2

## 2018-03-22 ASSESSMENT — PAIN DESCRIPTION - ORIENTATION: ORIENTATION: LEFT

## 2018-03-22 ASSESSMENT — PAIN DESCRIPTION - FREQUENCY: FREQUENCY: CONTINUOUS

## 2018-03-22 ASSESSMENT — PAIN DESCRIPTION - ONSET: ONSET: ON-GOING

## 2018-03-22 ASSESSMENT — PAIN DESCRIPTION - LOCATION: LOCATION: LEG

## 2018-03-22 ASSESSMENT — PAIN DESCRIPTION - DESCRIPTORS: DESCRIPTORS: BURNING

## 2018-03-22 ASSESSMENT — PAIN DESCRIPTION - PROGRESSION: CLINICAL_PROGRESSION: NOT CHANGED

## 2018-03-22 NOTE — PROGRESS NOTES
Types: Cigarettes     Start date: 1/1/1972    Smokeless tobacco: Never Used    Alcohol use No      Comment: no etoh since 2015 ( hx heavy)       ALLERGIES    Allergies   Allergen Reactions    Vicodin [Hydrocodone-Acetaminophen] Itching       MEDICATIONS    Current Outpatient Prescriptions on File Prior to Encounter   Medication Sig Dispense Refill    lisinopril (PRINIVIL;ZESTRIL) 20 MG tablet Take 20 mg by mouth daily      clopidogrel (PLAVIX) 75 MG tablet Take 1 tablet by mouth daily 90 tablet 3    doxazosin (CARDURA) 2 MG tablet Take 1 tablet by mouth daily 30 tablet 3    tiotropium (SPIRIVA HANDIHALER) 18 MCG inhalation capsule Inhale 18 mcg into the lungs daily      pravastatin (PRAVACHOL) 80 MG tablet Take 80 mg by mouth daily      busPIRone (BUSPAR) 15 MG tablet Take 15 mg by mouth 3 times daily      budesonide-formoterol (SYMBICORT) 160-4.5 MCG/ACT AERO Inhale 2 puffs into the lungs 2 times daily      gabapentin (NEURONTIN) 300 MG capsule Take 300 mg by mouth 3 times daily      fluticasone (FLONASE) 50 MCG/ACT nasal spray 1 spray by Nasal route daily      buPROPion (WELLBUTRIN XL) 150 MG extended release tablet Take 150 mg by mouth every morning      collagenase 250 UNIT/GM ointment Apply topically daily Apply topically daily. No current facility-administered medications on file prior to encounter. REVIEW OF SYSTEMS    Pertinent items are noted in HPI. Constitutional: Negative for systemic symptoms including fever, chills and malaise. Objective:      /80   Pulse 71   Temp 97.6 °F (36.4 °C) (Temporal)   Resp 16     PHYSICAL EXAM      General: The patient is in no acute distress. Mental status:  Patient is appropriate, is  oriented to place and plan of care.   Dermatologic exam: Visual inspection of the periwound reveals the skin to be normal in turgor and texture  Wound exam: see wound description below in procedure note      Assessment:     Problem List Items  WOUND CULTURE LEFT MEDIAL LEG WOUND OBTAINEDon 2/7/18              Reviewed with patient on 2/14/2018-positive multidrug resistant MRSA-sensitive to Doxycycline & prescribed                               Date:               Labs/ HbA1c:   5.3 ON 9/17/2017                                             Date               Grafts:                                   XVRE               HBO:                Antibiotics: 2/14/2018-Doxycycline 100mg twice daily for 10 days until completed              Earlier Wound care treatments:                Authorizations:                        Consults: 2/14/2018-REFERRED TO DR. James Bain [facility] physician:      Continuing wound care orders and information:              Residence:                Continue home health care with:               Your wound-care supplies will be provided by: Krishna Broderick provider: [id number] with  Hilaria Officer loading:  Date               ZQJPK Medications: 2/7/2018--RX: SANTYL:APPLY NICKEL DEPTH LAYER TO WOUND BED DAILY              EFIJV cleansing:                           ON not scrub or use excessive force.                          Wash hands with soap and water before and after dressing changes.                         Prior to applying a clean dressing, cleanse wound with normal saline,                                wound cleanser, or mild soap and water.                           Ask the physician or nurse before getting the wound(s) wet in a shower              Daily Wound management:                          Keep weight off wounds and reposition every 2 hours.                          Avoid standing for long periods of time.                          Apply wraps/stockings in AM and remove at bedtime.                          If swelling is present, elevate legs to the level of the heart or above for 30 minutes 4-5 times a day and/or when sitting.

## 2018-03-22 NOTE — PROGRESS NOTES
Nonselective enzymatic debridement performed with Santyl per physician order to wound(s) of the Left medial lower leg  Patient tolerated the procedure well.

## 2018-03-27 ENCOUNTER — HOSPITAL ENCOUNTER (OUTPATIENT)
Dept: WOUND CARE | Age: 63
Discharge: HOME | End: 2018-03-27
Attending: NURSE PRACTITIONER | Admitting: NURSE PRACTITIONER

## 2018-03-27 VITALS
HEART RATE: 74 BPM | RESPIRATION RATE: 16 BRPM | SYSTOLIC BLOOD PRESSURE: 100 MMHG | TEMPERATURE: 97.4 F | DIASTOLIC BLOOD PRESSURE: 77 MMHG

## 2018-03-27 DIAGNOSIS — L97.929 CHRONIC VENOUS HYPERTENSION (IDIOPATHIC) WITH ULCER AND INFLAMMATION OF LEFT LOWER EXTREMITY (HCC): ICD-10-CM

## 2018-03-27 DIAGNOSIS — T81.89XD NONHEALING SURGICAL WOUND, SUBSEQUENT ENCOUNTER: ICD-10-CM

## 2018-03-27 DIAGNOSIS — L97.922 CHRONIC ULCER OF LEFT LOWER EXTREMITY WITH FAT LAYER EXPOSED (HCC): Primary | ICD-10-CM

## 2018-03-27 DIAGNOSIS — I87.332 CHRONIC VENOUS HYPERTENSION (IDIOPATHIC) WITH ULCER AND INFLAMMATION OF LEFT LOWER EXTREMITY (HCC): ICD-10-CM

## 2018-03-27 PROCEDURE — 11042 DBRDMT SUBQ TIS 1ST 20SQCM/<: CPT | Performed by: NURSE PRACTITIONER

## 2018-03-27 RX ORDER — LIDOCAINE HYDROCHLORIDE 40 MG/ML
SOLUTION TOPICAL ONCE
Status: DISCONTINUED | OUTPATIENT
Start: 2018-03-27 | End: 2018-03-28 | Stop reason: HOSPADM

## 2018-03-27 ASSESSMENT — PAIN DESCRIPTION - FREQUENCY: FREQUENCY: INTERMITTENT

## 2018-03-27 ASSESSMENT — PAIN DESCRIPTION - DESCRIPTORS: DESCRIPTORS: BURNING

## 2018-03-27 ASSESSMENT — PAIN DESCRIPTION - PAIN TYPE: TYPE: CHRONIC PAIN

## 2018-03-27 ASSESSMENT — PAIN DESCRIPTION - PROGRESSION: CLINICAL_PROGRESSION: NOT CHANGED

## 2018-03-27 ASSESSMENT — PAIN SCALES - GENERAL: PAINLEVEL_OUTOF10: 2

## 2018-03-27 ASSESSMENT — PAIN DESCRIPTION - LOCATION: LOCATION: LEG

## 2018-03-27 ASSESSMENT — PAIN DESCRIPTION - ORIENTATION: ORIENTATION: LEFT

## 2018-03-27 ASSESSMENT — PAIN DESCRIPTION - ONSET: ONSET: ON-GOING
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
## 2018-04-03 ENCOUNTER — OFFICE VISIT (OUTPATIENT)
Dept: FAMILY MEDICINE CLINIC | Age: 63
End: 2018-04-03

## 2018-04-03 ENCOUNTER — HOSPITAL ENCOUNTER (OUTPATIENT)
Dept: WOUND CARE | Age: 63
Discharge: HOME | End: 2018-04-03
Attending: SURGERY | Admitting: SURGERY

## 2018-04-03 VITALS
HEART RATE: 75 BPM | TEMPERATURE: 96.1 F | RESPIRATION RATE: 16 BRPM | SYSTOLIC BLOOD PRESSURE: 103 MMHG | DIASTOLIC BLOOD PRESSURE: 69 MMHG

## 2018-04-03 VITALS
RESPIRATION RATE: 16 BRPM | DIASTOLIC BLOOD PRESSURE: 68 MMHG | WEIGHT: 221.2 LBS | BODY MASS INDEX: 31.74 KG/M2 | SYSTOLIC BLOOD PRESSURE: 100 MMHG | HEART RATE: 79 BPM | OXYGEN SATURATION: 98 %

## 2018-04-03 DIAGNOSIS — L97.922 CHRONIC ULCER OF LEFT LOWER EXTREMITY WITH FAT LAYER EXPOSED (HCC): ICD-10-CM

## 2018-04-03 DIAGNOSIS — F43.10 PTSD (POST-TRAUMATIC STRESS DISORDER): ICD-10-CM

## 2018-04-03 DIAGNOSIS — F17.200 HAS BEEN SMOKING TOBACCO FOR 30 YEARS OR MORE: ICD-10-CM

## 2018-04-03 DIAGNOSIS — R39.9 LOWER URINARY TRACT SYMPTOMS (LUTS): ICD-10-CM

## 2018-04-03 DIAGNOSIS — I10 ESSENTIAL HYPERTENSION: ICD-10-CM

## 2018-04-03 DIAGNOSIS — F07.81 POST CONCUSSION SYNDROME: Primary | ICD-10-CM

## 2018-04-03 DIAGNOSIS — L97.929 CHRONIC VENOUS HYPERTENSION (IDIOPATHIC) WITH ULCER AND INFLAMMATION OF LEFT LOWER EXTREMITY (HCC): ICD-10-CM

## 2018-04-03 DIAGNOSIS — R79.89 LOW TESTOSTERONE IN MALE: ICD-10-CM

## 2018-04-03 DIAGNOSIS — I73.9 PAD (PERIPHERAL ARTERY DISEASE) (HCC): ICD-10-CM

## 2018-04-03 DIAGNOSIS — I87.332 CHRONIC VENOUS HYPERTENSION (IDIOPATHIC) WITH ULCER AND INFLAMMATION OF LEFT LOWER EXTREMITY (HCC): ICD-10-CM

## 2018-04-03 DIAGNOSIS — J44.9 CHRONIC OBSTRUCTIVE PULMONARY DISEASE, UNSPECIFIED COPD TYPE (HCC): ICD-10-CM

## 2018-04-03 DIAGNOSIS — L97.922 CHRONIC ULCER OF LEFT LOWER EXTREMITY WITH FAT LAYER EXPOSED (HCC): Primary | ICD-10-CM

## 2018-04-03 PROCEDURE — 99214 OFFICE O/P EST MOD 30 MIN: CPT | Performed by: FAMILY MEDICINE

## 2018-04-03 RX ORDER — DOXYCYCLINE HYCLATE 100 MG/1
CAPSULE ORAL
COMMUNITY
Start: 2018-02-14 | End: 2018-04-10 | Stop reason: ALTCHOICE

## 2018-04-03 RX ORDER — DOXAZOSIN MESYLATE 4 MG/1
4 TABLET ORAL DAILY
Qty: 30 TABLET | Refills: 3 | Status: SHIPPED | OUTPATIENT
Start: 2018-04-03 | End: 2018-08-16 | Stop reason: SDUPTHER

## 2018-04-03 ASSESSMENT — ENCOUNTER SYMPTOMS
CONSTIPATION: 0
BLOOD IN STOOL: 0
COUGH: 0
CHEST TIGHTNESS: 0
DIARRHEA: 0
WHEEZING: 0
BACK PAIN: 0
NAUSEA: 0
VOMITING: 0
SHORTNESS OF BREATH: 0
SINUS PRESSURE: 0
SORE THROAT: 0
RHINORRHEA: 0
ALLERGIC/IMMUNOLOGIC NEGATIVE: 1
ABDOMINAL PAIN: 0

## 2018-04-03 ASSESSMENT — PAIN DESCRIPTION - FREQUENCY: FREQUENCY: INTERMITTENT

## 2018-04-03 ASSESSMENT — PAIN DESCRIPTION - LOCATION: LOCATION: LEG

## 2018-04-03 ASSESSMENT — PAIN SCALES - GENERAL: PAINLEVEL_OUTOF10: 2

## 2018-04-03 ASSESSMENT — PAIN DESCRIPTION - DESCRIPTORS: DESCRIPTORS: BURNING

## 2018-04-03 ASSESSMENT — PAIN DESCRIPTION - ORIENTATION: ORIENTATION: LEFT

## 2018-04-03 ASSESSMENT — PAIN DESCRIPTION - ONSET: ONSET: ON-GOING

## 2018-04-03 ASSESSMENT — PAIN DESCRIPTION - PAIN TYPE: TYPE: CHRONIC PAIN

## 2018-04-10 ENCOUNTER — HOSPITAL ENCOUNTER (OUTPATIENT)
Dept: WOUND CARE | Age: 63
Discharge: HOME | End: 2018-04-10
Attending: SURGERY | Admitting: SURGERY

## 2018-04-10 VITALS
DIASTOLIC BLOOD PRESSURE: 72 MMHG | SYSTOLIC BLOOD PRESSURE: 107 MMHG | HEART RATE: 74 BPM | RESPIRATION RATE: 16 BRPM | TEMPERATURE: 97.2 F

## 2018-04-10 DIAGNOSIS — L97.922 CHRONIC ULCER OF LEFT LOWER EXTREMITY WITH FAT LAYER EXPOSED (HCC): Primary | ICD-10-CM

## 2018-04-10 ASSESSMENT — PAIN DESCRIPTION - ONSET: ONSET: ON-GOING

## 2018-04-10 ASSESSMENT — PAIN DESCRIPTION - PAIN TYPE: TYPE: CHRONIC PAIN

## 2018-04-10 ASSESSMENT — PAIN DESCRIPTION - PROGRESSION: CLINICAL_PROGRESSION: NOT CHANGED

## 2018-04-10 ASSESSMENT — PAIN DESCRIPTION - FREQUENCY: FREQUENCY: INTERMITTENT

## 2018-04-10 ASSESSMENT — PAIN DESCRIPTION - LOCATION: LOCATION: LEG

## 2018-04-10 ASSESSMENT — PAIN SCALES - GENERAL: PAINLEVEL_OUTOF10: 2

## 2018-04-10 ASSESSMENT — PAIN DESCRIPTION - ORIENTATION: ORIENTATION: LEFT

## 2018-04-10 ASSESSMENT — PAIN DESCRIPTION - DESCRIPTORS: DESCRIPTORS: ACHING

## 2018-04-12 PROBLEM — Z12.11 SCREENING FOR COLON CANCER: Status: RESOLVED | Noted: 2017-10-30 | Resolved: 2018-04-12

## 2018-04-17 ENCOUNTER — HOSPITAL ENCOUNTER (OUTPATIENT)
Dept: WOUND CARE | Age: 63
Discharge: HOME | End: 2018-04-17
Attending: SURGERY | Admitting: SURGERY

## 2018-04-17 VITALS
DIASTOLIC BLOOD PRESSURE: 74 MMHG | RESPIRATION RATE: 16 BRPM | SYSTOLIC BLOOD PRESSURE: 111 MMHG | TEMPERATURE: 97.1 F | HEART RATE: 76 BPM

## 2018-04-17 DIAGNOSIS — I73.9 PERIPHERAL VASCULAR DISEASE (HCC): ICD-10-CM

## 2018-04-17 DIAGNOSIS — L97.922 CHRONIC ULCER OF LEFT LOWER EXTREMITY WITH FAT LAYER EXPOSED (HCC): Primary | ICD-10-CM

## 2018-04-24 ENCOUNTER — HOSPITAL ENCOUNTER (OUTPATIENT)
Dept: WOUND CARE | Age: 63
Discharge: HOME | End: 2018-04-24
Attending: SURGERY | Admitting: SURGERY

## 2018-04-24 VITALS
HEART RATE: 86 BPM | TEMPERATURE: 97 F | SYSTOLIC BLOOD PRESSURE: 113 MMHG | DIASTOLIC BLOOD PRESSURE: 75 MMHG | RESPIRATION RATE: 18 BRPM

## 2018-04-24 DIAGNOSIS — I73.9 PERIPHERAL VASCULAR DISEASE (HCC): ICD-10-CM

## 2018-04-24 DIAGNOSIS — L97.922 CHRONIC ULCER OF LEFT LOWER EXTREMITY WITH FAT LAYER EXPOSED (HCC): Primary | ICD-10-CM

## 2018-04-24 DIAGNOSIS — I73.9 PAD (PERIPHERAL ARTERY DISEASE) (HCC): ICD-10-CM

## 2018-05-01 ENCOUNTER — HOSPITAL ENCOUNTER (OUTPATIENT)
Dept: WOUND CARE | Age: 63
Discharge: HOME | End: 2018-05-01
Attending: SURGERY | Admitting: SURGERY

## 2018-05-01 VITALS
RESPIRATION RATE: 16 BRPM | TEMPERATURE: 97.6 F | SYSTOLIC BLOOD PRESSURE: 112 MMHG | HEART RATE: 72 BPM | DIASTOLIC BLOOD PRESSURE: 72 MMHG

## 2018-05-01 DIAGNOSIS — I73.9 PAD (PERIPHERAL ARTERY DISEASE) (HCC): ICD-10-CM

## 2018-05-01 DIAGNOSIS — L97.922 CHRONIC ULCER OF LEFT LOWER EXTREMITY WITH FAT LAYER EXPOSED (HCC): Primary | ICD-10-CM

## 2018-05-08 ENCOUNTER — HOSPITAL ENCOUNTER (OUTPATIENT)
Dept: WOUND CARE | Age: 63
Discharge: HOME | End: 2018-05-08
Attending: SURGERY | Admitting: SURGERY

## 2018-05-08 VITALS
HEART RATE: 76 BPM | SYSTOLIC BLOOD PRESSURE: 125 MMHG | DIASTOLIC BLOOD PRESSURE: 78 MMHG | TEMPERATURE: 97.8 F | RESPIRATION RATE: 16 BRPM

## 2018-05-08 DIAGNOSIS — I73.9 PAD (PERIPHERAL ARTERY DISEASE) (HCC): ICD-10-CM

## 2018-05-08 DIAGNOSIS — L97.922 CHRONIC ULCER OF LEFT LOWER EXTREMITY WITH FAT LAYER EXPOSED (HCC): Primary | ICD-10-CM

## 2018-05-08 ASSESSMENT — PAIN DESCRIPTION - PROGRESSION: CLINICAL_PROGRESSION: NOT CHANGED

## 2018-05-08 ASSESSMENT — PAIN DESCRIPTION - DESCRIPTORS: DESCRIPTORS: THROBBING

## 2018-05-08 ASSESSMENT — PAIN DESCRIPTION - ONSET: ONSET: ON-GOING

## 2018-05-08 ASSESSMENT — PAIN SCALES - GENERAL: PAINLEVEL_OUTOF10: 2

## 2018-05-08 ASSESSMENT — PAIN DESCRIPTION - PAIN TYPE: TYPE: CHRONIC PAIN

## 2018-05-08 ASSESSMENT — PAIN DESCRIPTION - FREQUENCY: FREQUENCY: INTERMITTENT

## 2018-05-08 ASSESSMENT — PAIN DESCRIPTION - ORIENTATION: ORIENTATION: LEFT

## 2018-05-08 ASSESSMENT — PAIN DESCRIPTION - LOCATION: LOCATION: LEG

## 2018-05-17 ENCOUNTER — HOSPITAL ENCOUNTER (OUTPATIENT)
Dept: WOUND CARE | Age: 63
Discharge: HOME | End: 2018-05-17
Attending: INTERNAL MEDICINE | Admitting: INTERNAL MEDICINE

## 2018-05-17 VITALS
RESPIRATION RATE: 18 BRPM | SYSTOLIC BLOOD PRESSURE: 114 MMHG | HEART RATE: 70 BPM | DIASTOLIC BLOOD PRESSURE: 75 MMHG | TEMPERATURE: 96.7 F

## 2018-05-17 DIAGNOSIS — T81.89XD NONHEALING SURGICAL WOUND, SUBSEQUENT ENCOUNTER: Primary | ICD-10-CM

## 2018-05-17 DIAGNOSIS — L97.929 CHRONIC VENOUS HYPERTENSION (IDIOPATHIC) WITH ULCER AND INFLAMMATION OF LEFT LOWER EXTREMITY (HCC): ICD-10-CM

## 2018-05-17 DIAGNOSIS — L97.922 CHRONIC ULCER OF LEFT LOWER EXTREMITY WITH FAT LAYER EXPOSED (HCC): ICD-10-CM

## 2018-05-17 DIAGNOSIS — I87.332 CHRONIC VENOUS HYPERTENSION (IDIOPATHIC) WITH ULCER AND INFLAMMATION OF LEFT LOWER EXTREMITY (HCC): ICD-10-CM

## 2018-05-17 ASSESSMENT — PAIN DESCRIPTION - ONSET: ONSET: ON-GOING

## 2018-05-17 ASSESSMENT — PAIN DESCRIPTION - LOCATION: LOCATION: LEG

## 2018-05-17 ASSESSMENT — PAIN DESCRIPTION - DESCRIPTORS: DESCRIPTORS: PATIENT UNABLE TO DESCRIBE

## 2018-05-17 ASSESSMENT — PAIN DESCRIPTION - PROGRESSION: CLINICAL_PROGRESSION: NOT CHANGED

## 2018-05-17 ASSESSMENT — PAIN SCALES - GENERAL: PAINLEVEL_OUTOF10: 1

## 2018-05-17 ASSESSMENT — PAIN DESCRIPTION - PAIN TYPE: TYPE: CHRONIC PAIN

## 2018-05-17 ASSESSMENT — PAIN DESCRIPTION - ORIENTATION: ORIENTATION: LEFT

## 2018-05-17 ASSESSMENT — PAIN DESCRIPTION - FREQUENCY: FREQUENCY: INTERMITTENT

## 2018-05-22 ENCOUNTER — HOSPITAL ENCOUNTER (OUTPATIENT)
Dept: WOUND CARE | Age: 63
Discharge: HOME | End: 2018-06-20
Admitting: FAMILY MEDICINE

## 2018-05-22 ENCOUNTER — HOSPITAL ENCOUNTER (OUTPATIENT)
Dept: WOUND CARE | Age: 63
Discharge: HOME | End: 2018-05-22
Attending: SURGERY | Admitting: SURGERY

## 2018-05-22 VITALS
RESPIRATION RATE: 18 BRPM | TEMPERATURE: 97.6 F | DIASTOLIC BLOOD PRESSURE: 91 MMHG | SYSTOLIC BLOOD PRESSURE: 149 MMHG | HEART RATE: 73 BPM

## 2018-05-22 DIAGNOSIS — L97.922 CHRONIC ULCER OF LEFT LOWER EXTREMITY WITH FAT LAYER EXPOSED (HCC): Primary | ICD-10-CM

## 2018-05-29 ENCOUNTER — HOSPITAL ENCOUNTER (OUTPATIENT)
Dept: WOUND CARE | Age: 63
Discharge: HOME | End: 2018-05-29
Attending: SURGERY | Admitting: SURGERY

## 2018-05-29 VITALS
SYSTOLIC BLOOD PRESSURE: 102 MMHG | TEMPERATURE: 97.2 F | RESPIRATION RATE: 16 BRPM | HEART RATE: 62 BPM | DIASTOLIC BLOOD PRESSURE: 70 MMHG

## 2018-05-29 DIAGNOSIS — L97.922 CHRONIC ULCER OF LEFT LOWER EXTREMITY WITH FAT LAYER EXPOSED (HCC): Primary | ICD-10-CM

## 2018-05-29 ASSESSMENT — PAIN DESCRIPTION - DESCRIPTORS: DESCRIPTORS: BURNING

## 2018-05-29 ASSESSMENT — PAIN DESCRIPTION - PROGRESSION: CLINICAL_PROGRESSION: NOT CHANGED

## 2018-05-29 ASSESSMENT — PAIN DESCRIPTION - LOCATION: LOCATION: LEG

## 2018-05-29 ASSESSMENT — PAIN SCALES - GENERAL: PAINLEVEL_OUTOF10: 1

## 2018-05-29 ASSESSMENT — PAIN DESCRIPTION - ONSET: ONSET: ON-GOING

## 2018-05-29 ASSESSMENT — PAIN DESCRIPTION - ORIENTATION: ORIENTATION: LEFT

## 2018-05-29 ASSESSMENT — PAIN DESCRIPTION - PAIN TYPE: TYPE: CHRONIC PAIN

## 2018-05-29 ASSESSMENT — PAIN DESCRIPTION - FREQUENCY: FREQUENCY: INTERMITTENT

## 2018-05-30 ENCOUNTER — OFFICE VISIT (OUTPATIENT)
Dept: FAMILY MEDICINE CLINIC | Age: 63
End: 2018-05-30

## 2018-05-30 VITALS
DIASTOLIC BLOOD PRESSURE: 70 MMHG | HEART RATE: 67 BPM | SYSTOLIC BLOOD PRESSURE: 122 MMHG | WEIGHT: 220.4 LBS | OXYGEN SATURATION: 96 % | BODY MASS INDEX: 31.62 KG/M2 | RESPIRATION RATE: 20 BRPM

## 2018-05-30 DIAGNOSIS — Z72.0 TOBACCO ABUSE: ICD-10-CM

## 2018-05-30 DIAGNOSIS — L97.922 CHRONIC ULCER OF LEFT LOWER EXTREMITY WITH FAT LAYER EXPOSED (HCC): ICD-10-CM

## 2018-05-30 DIAGNOSIS — J00 NASOPHARYNGITIS: ICD-10-CM

## 2018-05-30 DIAGNOSIS — M79.605 LEFT LEG PAIN: Primary | ICD-10-CM

## 2018-05-30 DIAGNOSIS — F17.200 HAS BEEN SMOKING TOBACCO FOR 30 YEARS OR MORE: ICD-10-CM

## 2018-05-30 DIAGNOSIS — J44.9 CHRONIC OBSTRUCTIVE PULMONARY DISEASE, UNSPECIFIED COPD TYPE (HCC): ICD-10-CM

## 2018-05-30 DIAGNOSIS — I73.9 PAD (PERIPHERAL ARTERY DISEASE) (HCC): ICD-10-CM

## 2018-05-30 PROCEDURE — 99214 OFFICE O/P EST MOD 30 MIN: CPT | Performed by: FAMILY MEDICINE

## 2018-05-30 RX ORDER — FLUTICASONE PROPIONATE 50 MCG
1 SPRAY, SUSPENSION (ML) NASAL DAILY
Qty: 1 BOTTLE | Refills: 3 | Status: SHIPPED | OUTPATIENT
Start: 2018-05-30 | End: 2019-12-09 | Stop reason: ALTCHOICE

## 2018-05-30 RX ORDER — POLYETHYLENE GLYCOL 3350 17 G
2 POWDER IN PACKET (EA) ORAL PRN
Qty: 100 EACH | Refills: 3 | Status: SHIPPED | OUTPATIENT
Start: 2018-05-30 | End: 2019-12-09 | Stop reason: ALTCHOICE

## 2018-05-30 RX ORDER — NICOTINE 21 MG/24HR
1 PATCH, TRANSDERMAL 24 HOURS TRANSDERMAL EVERY 24 HOURS
Qty: 30 PATCH | Refills: 3 | Status: SHIPPED | OUTPATIENT
Start: 2018-05-30 | End: 2020-03-10 | Stop reason: ALTCHOICE

## 2018-05-30 ASSESSMENT — ENCOUNTER SYMPTOMS
COUGH: 1
EYES NEGATIVE: 1
HEMOPTYSIS: 0
SHORTNESS OF BREATH: 1
WHEEZING: 0
SPUTUM PRODUCTION: 1
ORTHOPNEA: 0
GASTROINTESTINAL NEGATIVE: 1

## 2018-06-04 ENCOUNTER — TELEPHONE (OUTPATIENT)
Dept: FAMILY MEDICINE CLINIC | Age: 63
End: 2018-06-04

## 2018-06-04 DIAGNOSIS — J44.1 COPD EXACERBATION (HCC): Primary | ICD-10-CM

## 2018-06-04 RX ORDER — SULFAMETHOXAZOLE AND TRIMETHOPRIM 800; 160 MG/1; MG/1
1 TABLET ORAL 2 TIMES DAILY
Qty: 14 TABLET | Refills: 0 | Status: SHIPPED | OUTPATIENT
Start: 2018-06-04 | End: 2018-06-05 | Stop reason: ALTCHOICE

## 2018-06-05 ENCOUNTER — HOSPITAL ENCOUNTER (OUTPATIENT)
Dept: WOUND CARE | Age: 63
Discharge: HOME | End: 2018-06-05
Attending: SURGERY | Admitting: SURGERY

## 2018-06-05 VITALS
SYSTOLIC BLOOD PRESSURE: 114 MMHG | DIASTOLIC BLOOD PRESSURE: 74 MMHG | HEART RATE: 74 BPM | RESPIRATION RATE: 16 BRPM | TEMPERATURE: 97.1 F

## 2018-06-05 DIAGNOSIS — L97.922 CHRONIC ULCER OF LEFT LOWER EXTREMITY WITH FAT LAYER EXPOSED (HCC): Primary | ICD-10-CM

## 2018-06-05 RX ORDER — SULFAMETHOXAZOLE AND TRIMETHOPRIM 800; 160 MG/1; MG/1
1 TABLET ORAL 2 TIMES DAILY
Qty: 14 TABLET | Refills: 0 | Status: SHIPPED | OUTPATIENT
Start: 2018-06-05 | End: 2018-06-12

## 2018-06-05 ASSESSMENT — PAIN DESCRIPTION - DESCRIPTORS: DESCRIPTORS: THROBBING

## 2018-06-05 ASSESSMENT — PAIN SCALES - GENERAL: PAINLEVEL_OUTOF10: 1

## 2018-06-05 ASSESSMENT — PAIN DESCRIPTION - PROGRESSION: CLINICAL_PROGRESSION: NOT CHANGED

## 2018-06-05 ASSESSMENT — PAIN DESCRIPTION - PAIN TYPE: TYPE: CHRONIC PAIN

## 2018-06-05 ASSESSMENT — PAIN DESCRIPTION - ONSET: ONSET: ON-GOING

## 2018-06-05 ASSESSMENT — PAIN DESCRIPTION - LOCATION: LOCATION: LEG

## 2018-06-05 ASSESSMENT — PAIN DESCRIPTION - ORIENTATION: ORIENTATION: LEFT;LOWER

## 2018-06-05 ASSESSMENT — PAIN DESCRIPTION - FREQUENCY: FREQUENCY: CONTINUOUS

## 2018-06-12 ENCOUNTER — HOSPITAL ENCOUNTER (OUTPATIENT)
Dept: WOUND CARE | Age: 63
Discharge: HOME | End: 2018-06-12
Attending: SURGERY | Admitting: SURGERY

## 2018-06-12 VITALS
TEMPERATURE: 97.3 F | HEART RATE: 76 BPM | RESPIRATION RATE: 16 BRPM | DIASTOLIC BLOOD PRESSURE: 75 MMHG | SYSTOLIC BLOOD PRESSURE: 112 MMHG

## 2018-06-12 DIAGNOSIS — I73.9 PAD (PERIPHERAL ARTERY DISEASE) (HCC): ICD-10-CM

## 2018-06-12 DIAGNOSIS — L97.922 CHRONIC ULCER OF LEFT LOWER EXTREMITY WITH FAT LAYER EXPOSED (HCC): Primary | ICD-10-CM

## 2018-06-12 ASSESSMENT — PAIN SCALES - GENERAL: PAINLEVEL_OUTOF10: 0

## 2018-06-26 ENCOUNTER — HOSPITAL ENCOUNTER (OUTPATIENT)
Dept: WOUND CARE | Age: 63
Discharge: HOME | End: 2018-06-26
Attending: SURGERY | Admitting: SURGERY

## 2018-06-26 DIAGNOSIS — I73.9 PAD (PERIPHERAL ARTERY DISEASE) (HCC): ICD-10-CM

## 2018-06-26 DIAGNOSIS — L97.922 CHRONIC ULCER OF LEFT LOWER EXTREMITY WITH FAT LAYER EXPOSED (HCC): Primary | ICD-10-CM

## 2018-07-03 ENCOUNTER — HOSPITAL ENCOUNTER (OUTPATIENT)
Dept: WOUND CARE | Age: 63
Discharge: HOME | End: 2018-07-03
Attending: SURGERY | Admitting: SURGERY

## 2018-07-03 VITALS
SYSTOLIC BLOOD PRESSURE: 122 MMHG | HEART RATE: 74 BPM | DIASTOLIC BLOOD PRESSURE: 82 MMHG | RESPIRATION RATE: 16 BRPM | TEMPERATURE: 97.7 F

## 2018-07-03 DIAGNOSIS — L97.922 CHRONIC ULCER OF LEFT LOWER EXTREMITY WITH FAT LAYER EXPOSED (HCC): Primary | ICD-10-CM

## 2018-07-03 NOTE — PROGRESS NOTES
healed. Plan:     Discharge Instructions       PHYSICIAN ORDERS AND DISCHARGE INSTRUCTIONS     NOTE: Upon discharge from the [address], you will receive a patient experience survey. We would be grateful if you would take the time to fill this survey out.     Wound care order history:                 JUAN CARLOS's: SHIELA AT THIS TIME D/T ARTERIAL DISEASE           Right       Left                                              Date               Vascular studies: ARTERIAL STUDIES ORDERED and planned for 5/2/18--Dr Jade Snell 5/21/18--he is requesting to do angeo--patient is unsure at this time              Imaging: XRAY LEFT LEG ORDERED ON 2/14/2018--negative no osteo or metal--just vascular clips               Cultures:  WOUND CULTURE LEFT MEDIAL LEG WOUND OBTAINEDon 2/7/18              Reviewed with patient on 2/14/2018-positive multidrug resistant MRSA-sensitive to Doxycycline & prescribed                               Date:               Labs/ HbA1c:   5.3 ON 9/17/2017                                             Date               Grafts:                                   POBO               HBO:                Antibiotics: 2/14/2018-Doxycycline 100mg twice daily for 10 days until completed              Earlier Wound care treatments:                Authorizations:                        Consults: 2/14/2018-REFERRED TO DR. James Smith chema [facility] physician:      Continuing wound care orders and information:              Residence:                Continue home health care with:               Your wound-care supplies will be provided by:  Rafal Dubon provider: [address] with  Michel Powell loading:  Date               FBLGW Medications: 2/7/2018--RX: SANTYL:APPLY NICKEL DEPTH LAYER TO WOUND BED DAILY              Wound cleansing:                           KW not scrub or use excessive force.                          Wash hands with soap and water before and after dressing changes.                         Prior to applying a clean dressing, cleanse wound with normal saline,                                wound cleanser, or mild soap and water.                           Ask the physician or nurse before getting the wound(s) wet in a shower              Daily Wound management:                          Keep weight off wounds and reposition every 2 hours.                          LQQTX standing for long periods of time.                          BBLYO wraps/stockings in AM and remove at bedtime.                          If swelling is present, elevate legs to the level of the heart or above for 30 minutes 4-5 times a day and/or when sitting.                                             When taking antibiotics take entire prescription as ordered by physician do not stop taking until medicine is all gone.                    Orders for this week: 7/3/2018          LEFT LOWER LEG: ---healed---    DISCHARGE FROM WOUND CLINIC    Follow up with [facility] wound clinic AS NEEDED     Call [phone] for any questions or concerns.   Date__________   Time____________              Treatment Note      Written Patient Dismissal Instructions Given            Electronically signed by Maureen Hills MD on 7/3/2018 at 12:37 PM

## 2018-07-11 RX ORDER — BUPROPION HYDROCHLORIDE 150 MG/1
150 TABLET ORAL EVERY MORNING
Qty: 90 TABLET | Refills: 3 | Status: SHIPPED | OUTPATIENT
Start: 2018-07-11 | End: 2019-08-31 | Stop reason: SDUPTHER

## 2018-08-16 DIAGNOSIS — F43.10 PTSD (POST-TRAUMATIC STRESS DISORDER): ICD-10-CM

## 2018-08-16 DIAGNOSIS — R39.9 LOWER URINARY TRACT SYMPTOMS (LUTS): ICD-10-CM

## 2018-08-16 RX ORDER — DOXAZOSIN MESYLATE 4 MG/1
4 TABLET ORAL DAILY
Qty: 30 TABLET | Refills: 3 | Status: SHIPPED | OUTPATIENT
Start: 2018-08-16 | End: 2018-10-22 | Stop reason: SDUPTHER

## 2018-10-03 RX ORDER — GABAPENTIN 300 MG/1
300 CAPSULE ORAL 3 TIMES DAILY
Qty: 90 CAPSULE | OUTPATIENT
Start: 2018-10-03

## 2018-10-22 ENCOUNTER — OFFICE VISIT (OUTPATIENT)
Dept: FAMILY MEDICINE CLINIC | Age: 63
End: 2018-10-22
Payer: MEDICARE

## 2018-10-22 VITALS
WEIGHT: 226 LBS | OXYGEN SATURATION: 95 % | SYSTOLIC BLOOD PRESSURE: 130 MMHG | BODY MASS INDEX: 31.64 KG/M2 | DIASTOLIC BLOOD PRESSURE: 84 MMHG | RESPIRATION RATE: 16 BRPM | HEIGHT: 71 IN | HEART RATE: 98 BPM

## 2018-10-22 DIAGNOSIS — N52.01 ERECTILE DYSFUNCTION DUE TO ARTERIAL INSUFFICIENCY: ICD-10-CM

## 2018-10-22 DIAGNOSIS — R07.89 CHEST WALL PAIN, CHRONIC: ICD-10-CM

## 2018-10-22 DIAGNOSIS — F43.10 PTSD (POST-TRAUMATIC STRESS DISORDER): ICD-10-CM

## 2018-10-22 DIAGNOSIS — I73.9 PAD (PERIPHERAL ARTERY DISEASE) (HCC): ICD-10-CM

## 2018-10-22 DIAGNOSIS — R09.81 SINUS CONGESTION: ICD-10-CM

## 2018-10-22 DIAGNOSIS — J44.9 CHRONIC OBSTRUCTIVE PULMONARY DISEASE, UNSPECIFIED COPD TYPE (HCC): Primary | ICD-10-CM

## 2018-10-22 DIAGNOSIS — Z72.0 TOBACCO ABUSE: ICD-10-CM

## 2018-10-22 DIAGNOSIS — I10 ESSENTIAL HYPERTENSION: ICD-10-CM

## 2018-10-22 DIAGNOSIS — Z23 NEEDS FLU SHOT: ICD-10-CM

## 2018-10-22 DIAGNOSIS — R39.9 LOWER URINARY TRACT SYMPTOMS (LUTS): ICD-10-CM

## 2018-10-22 DIAGNOSIS — G89.29 CHEST WALL PAIN, CHRONIC: ICD-10-CM

## 2018-10-22 PROBLEM — T81.89XD NONHEALING SURGICAL WOUND, SUBSEQUENT ENCOUNTER: Status: RESOLVED | Noted: 2018-02-14 | Resolved: 2018-10-22

## 2018-10-22 PROBLEM — I87.332 CHRONIC VENOUS HYPERTENSION (IDIOPATHIC) WITH ULCER AND INFLAMMATION OF LEFT LOWER EXTREMITY (HCC): Status: RESOLVED | Noted: 2018-02-07 | Resolved: 2018-10-22

## 2018-10-22 PROBLEM — L97.929 CHRONIC VENOUS HYPERTENSION (IDIOPATHIC) WITH ULCER AND INFLAMMATION OF LEFT LOWER EXTREMITY (HCC): Status: RESOLVED | Noted: 2018-02-07 | Resolved: 2018-10-22

## 2018-10-22 PROBLEM — L97.922 CHRONIC ULCER OF LEFT LOWER EXTREMITY WITH FAT LAYER EXPOSED (HCC): Status: RESOLVED | Noted: 2018-02-07 | Resolved: 2018-10-22

## 2018-10-22 PROCEDURE — 99214 OFFICE O/P EST MOD 30 MIN: CPT | Performed by: FAMILY MEDICINE

## 2018-10-22 PROCEDURE — 90688 IIV4 VACCINE SPLT 0.5 ML IM: CPT | Performed by: FAMILY MEDICINE

## 2018-10-22 PROCEDURE — G0008 ADMIN INFLUENZA VIRUS VAC: HCPCS | Performed by: FAMILY MEDICINE

## 2018-10-22 RX ORDER — PRAVASTATIN SODIUM 80 MG/1
80 TABLET ORAL DAILY
Qty: 90 TABLET | Refills: 3 | Status: SHIPPED | OUTPATIENT
Start: 2018-10-22 | End: 2019-10-24 | Stop reason: SDUPTHER

## 2018-10-22 RX ORDER — DOXAZOSIN MESYLATE 4 MG/1
4 TABLET ORAL DAILY
Qty: 90 TABLET | Refills: 3 | Status: SHIPPED | OUTPATIENT
Start: 2018-10-22 | End: 2019-11-14 | Stop reason: SDUPTHER

## 2018-10-22 RX ORDER — BUSPIRONE HYDROCHLORIDE 15 MG/1
15 TABLET ORAL 3 TIMES DAILY
Qty: 270 TABLET | Refills: 3 | Status: SHIPPED | OUTPATIENT
Start: 2018-10-22 | End: 2019-10-24 | Stop reason: SDUPTHER

## 2018-10-22 RX ORDER — GABAPENTIN 300 MG/1
300 CAPSULE ORAL 3 TIMES DAILY
Qty: 90 CAPSULE | Refills: 0 | Status: SHIPPED | OUTPATIENT
Start: 2018-10-22 | End: 2019-01-10 | Stop reason: SDUPTHER

## 2018-10-22 RX ORDER — SILDENAFIL CITRATE 20 MG/1
20 TABLET ORAL PRN
Qty: 30 TABLET | Refills: 3 | Status: SHIPPED | OUTPATIENT
Start: 2018-10-22 | End: 2019-03-19 | Stop reason: SDUPTHER

## 2018-10-22 RX ORDER — CLOPIDOGREL BISULFATE 75 MG/1
75 TABLET ORAL DAILY
Qty: 90 TABLET | Refills: 3 | Status: SHIPPED | OUTPATIENT
Start: 2018-10-22 | End: 2019-10-24 | Stop reason: SDUPTHER

## 2018-10-22 RX ORDER — BUDESONIDE AND FORMOTEROL FUMARATE DIHYDRATE 160; 4.5 UG/1; UG/1
2 AEROSOL RESPIRATORY (INHALATION) 2 TIMES DAILY
Qty: 3 INHALER | Refills: 3 | Status: SHIPPED | OUTPATIENT
Start: 2018-10-22 | End: 2019-09-19 | Stop reason: SDUPTHER

## 2018-10-22 RX ORDER — IPRATROPIUM BROMIDE 21 UG/1
2 SPRAY, METERED NASAL EVERY 12 HOURS
Qty: 3 BOTTLE | Refills: 3 | Status: SHIPPED | OUTPATIENT
Start: 2018-10-22 | End: 2019-09-19 | Stop reason: SDUPTHER

## 2018-10-22 ASSESSMENT — PATIENT HEALTH QUESTIONNAIRE - PHQ9
2. FEELING DOWN, DEPRESSED OR HOPELESS: 0
1. LITTLE INTEREST OR PLEASURE IN DOING THINGS: 0
SUM OF ALL RESPONSES TO PHQ QUESTIONS 1-9: 0
SUM OF ALL RESPONSES TO PHQ QUESTIONS 1-9: 0
SUM OF ALL RESPONSES TO PHQ9 QUESTIONS 1 & 2: 0

## 2018-10-22 NOTE — PROGRESS NOTES
10/22/2018     Estefany Mosquera (:  1955) is a 66 y.o. male, here for evaluation of the following medical concerns:    1.nose frequently clogs, bilateral, day and night. flonase dries out sinuses. COPD:  Current treatment includes short-acting beta agonist inhaler, anticholinergic inhaler. Residual symptoms: chronic dyspnea. He denies chest tightness, chest pain, sore throat, weight loss. He requires his rescue inhaler 1 time(s) per day. Review of Systems    Prior to Visit Medications    Medication Sig Taking? Authorizing Provider   pravastatin (PRAVACHOL) 80 MG tablet Take 1 tablet by mouth daily Yes Mike Pina MD   doxazosin (CARDURA) 4 MG tablet Take 1 tablet by mouth daily Yes Mike Pina MD   gabapentin (NEURONTIN) 300 MG capsule Take 1 capsule by mouth 3 times daily for 90 days. Ness Gomes MD   budesonide-formoterol (SYMBICORT) 160-4.5 MCG/ACT AERO Inhale 2 puffs into the lungs 2 times daily Yes Mike Pina MD   clopidogrel (PLAVIX) 75 MG tablet Take 1 tablet by mouth daily Yes Mike Pina MD   busPIRone (BUSPAR) 15 MG tablet Take 15 mg by mouth 3 times daily Yes Mike Pina MD   tiotropium (Kelsy Goodie) 18 MCG inhalation capsule Inhale 1 capsule into the lungs daily Yes Mike Pina MD   ipratropium (ATROVENT) 0.03 % nasal spray 2 sprays by Nasal route every 12 hours Yes Mike Pina MD   diclofenac sodium 1 % GEL Apply 2 g topically 2 times daily Yes Mike Pina MD   sildenafil (REVATIO) 20 MG tablet Take 1 tablet by mouth as needed (ED) Yes Mike Pina MD   buPROPion (WELLBUTRIN XL) 150 MG extended release tablet Take 1 tablet by mouth every morning Yes Mike Pina MD   UNABLE TO FIND wheelchair Yes Mike Pina MD   nicotine (NICODERM CQ) 21 MG/24HR Place 1 patch onto the skin every 24 hours  Mike Pina MD   nicotine polacrilex (COMMIT) 2 MG lozenge Take 1 lozenge by mouth as needed for Smoking

## 2019-01-10 DIAGNOSIS — G89.29 CHEST WALL PAIN, CHRONIC: ICD-10-CM

## 2019-01-10 DIAGNOSIS — R07.89 CHEST WALL PAIN, CHRONIC: ICD-10-CM

## 2019-01-10 RX ORDER — GABAPENTIN 300 MG/1
300 CAPSULE ORAL 3 TIMES DAILY
Qty: 90 CAPSULE | Refills: 0 | Status: SHIPPED | OUTPATIENT
Start: 2019-01-10 | End: 2019-04-12 | Stop reason: SDUPTHER

## 2019-03-19 ENCOUNTER — OFFICE VISIT (OUTPATIENT)
Dept: FAMILY MEDICINE CLINIC | Age: 64
End: 2019-03-19
Payer: MEDICARE

## 2019-03-19 VITALS
RESPIRATION RATE: 16 BRPM | HEART RATE: 90 BPM | SYSTOLIC BLOOD PRESSURE: 126 MMHG | TEMPERATURE: 97.9 F | WEIGHT: 214.38 LBS | BODY MASS INDEX: 30.01 KG/M2 | DIASTOLIC BLOOD PRESSURE: 80 MMHG | HEIGHT: 71 IN

## 2019-03-19 DIAGNOSIS — I73.9 PAD (PERIPHERAL ARTERY DISEASE) (HCC): ICD-10-CM

## 2019-03-19 DIAGNOSIS — F17.200 HAS BEEN SMOKING TOBACCO FOR 30 YEARS OR MORE: ICD-10-CM

## 2019-03-19 DIAGNOSIS — I10 ESSENTIAL HYPERTENSION: Primary | ICD-10-CM

## 2019-03-19 DIAGNOSIS — N52.01 ERECTILE DYSFUNCTION DUE TO ARTERIAL INSUFFICIENCY: ICD-10-CM

## 2019-03-19 DIAGNOSIS — F07.81 POST CONCUSSION SYNDROME: ICD-10-CM

## 2019-03-19 DIAGNOSIS — Z87.891 PERSONAL HISTORY OF TOBACCO USE: ICD-10-CM

## 2019-03-19 DIAGNOSIS — Z72.0 TOBACCO ABUSE: ICD-10-CM

## 2019-03-19 DIAGNOSIS — Z23 NEED FOR PROPHYLACTIC VACCINATION AGAINST DIPHTHERIA-TETANUS-PERTUSSIS (DTP): ICD-10-CM

## 2019-03-19 DIAGNOSIS — F43.10 PTSD (POST-TRAUMATIC STRESS DISORDER): ICD-10-CM

## 2019-03-19 DIAGNOSIS — Z12.11 SCREENING FOR COLON CANCER: ICD-10-CM

## 2019-03-19 LAB
A/G RATIO: 1.6 (ref 1.1–2.2)
ALBUMIN SERPL-MCNC: 4.6 G/DL (ref 3.4–5)
ALP BLD-CCNC: 74 U/L (ref 40–129)
ALT SERPL-CCNC: 14 U/L (ref 10–40)
ANION GAP SERPL CALCULATED.3IONS-SCNC: 14 MMOL/L (ref 3–16)
AST SERPL-CCNC: 14 U/L (ref 15–37)
BILIRUB SERPL-MCNC: 0.5 MG/DL (ref 0–1)
BUN BLDV-MCNC: 14 MG/DL (ref 7–20)
CALCIUM SERPL-MCNC: 9.8 MG/DL (ref 8.3–10.6)
CHLORIDE BLD-SCNC: 104 MMOL/L (ref 99–110)
CHOLESTEROL, TOTAL: 158 MG/DL (ref 0–199)
CO2: 25 MMOL/L (ref 21–32)
CREAT SERPL-MCNC: 1.4 MG/DL (ref 0.8–1.3)
GFR AFRICAN AMERICAN: >60
GFR NON-AFRICAN AMERICAN: 51
GLOBULIN: 2.8 G/DL
GLUCOSE BLD-MCNC: 96 MG/DL (ref 70–99)
HDLC SERPL-MCNC: 31 MG/DL (ref 40–60)
LDL CHOLESTEROL CALCULATED: 94 MG/DL
POTASSIUM SERPL-SCNC: 5 MMOL/L (ref 3.5–5.1)
SODIUM BLD-SCNC: 143 MMOL/L (ref 136–145)
TOTAL PROTEIN: 7.4 G/DL (ref 6.4–8.2)
TRIGL SERPL-MCNC: 163 MG/DL (ref 0–150)
VLDLC SERPL CALC-MCNC: 33 MG/DL

## 2019-03-19 PROCEDURE — 36415 COLL VENOUS BLD VENIPUNCTURE: CPT | Performed by: FAMILY MEDICINE

## 2019-03-19 PROCEDURE — G0296 VISIT TO DETERM LDCT ELIG: HCPCS | Performed by: FAMILY MEDICINE

## 2019-03-19 PROCEDURE — 99214 OFFICE O/P EST MOD 30 MIN: CPT | Performed by: FAMILY MEDICINE

## 2019-03-19 RX ORDER — SILDENAFIL CITRATE 20 MG/1
20 TABLET ORAL PRN
Qty: 30 TABLET | Refills: 3 | Status: SHIPPED | OUTPATIENT
Start: 2019-03-19 | End: 2019-12-09 | Stop reason: SDUPTHER

## 2019-03-19 RX ORDER — CARBAMAZEPINE 200 MG/1
200 TABLET ORAL NIGHTLY
Qty: 90 TABLET | Refills: 3 | Status: SHIPPED | OUTPATIENT
Start: 2019-03-19 | End: 2019-12-09 | Stop reason: ALTCHOICE

## 2019-03-19 ASSESSMENT — ENCOUNTER SYMPTOMS
SINUS PRESSURE: 0
COUGH: 0
WHEEZING: 0
BACK PAIN: 0
NAUSEA: 0
CHEST TIGHTNESS: 0
RHINORRHEA: 0
DIARRHEA: 0
VOMITING: 0
SHORTNESS OF BREATH: 0
ALLERGIC/IMMUNOLOGIC NEGATIVE: 1
ABDOMINAL PAIN: 0
CONSTIPATION: 0
SORE THROAT: 0
BLOOD IN STOOL: 0

## 2019-03-19 ASSESSMENT — PATIENT HEALTH QUESTIONNAIRE - PHQ9
2. FEELING DOWN, DEPRESSED OR HOPELESS: 0
SUM OF ALL RESPONSES TO PHQ9 QUESTIONS 1 & 2: 0
SUM OF ALL RESPONSES TO PHQ QUESTIONS 1-9: 0
1. LITTLE INTEREST OR PLEASURE IN DOING THINGS: 0
SUM OF ALL RESPONSES TO PHQ QUESTIONS 1-9: 0

## 2019-03-20 ENCOUNTER — TELEPHONE (OUTPATIENT)
Dept: FAMILY MEDICINE CLINIC | Age: 64
End: 2019-03-20

## 2019-03-20 RX ORDER — AMOXICILLIN 500 MG/1
500 CAPSULE ORAL 3 TIMES DAILY
Qty: 30 CAPSULE | Refills: 0 | Status: SHIPPED | OUTPATIENT
Start: 2019-03-20 | End: 2019-03-30

## 2019-03-22 DIAGNOSIS — N28.9 FUNCTION KIDNEY DECREASED: Primary | ICD-10-CM

## 2019-04-08 ENCOUNTER — TELEPHONE (OUTPATIENT)
Dept: FAMILY MEDICINE CLINIC | Age: 64
End: 2019-04-08

## 2019-04-08 NOTE — TELEPHONE ENCOUNTER
Increase fluids as darker urine is generally just due to dehydration.   Unless he feels he can't urinate well doxazosin is likely doing the job  This is a very mild renal change and I don't believe we need to do anything other than just repeat the lab work is planned

## 2019-04-08 NOTE — TELEPHONE ENCOUNTER
Spoke to pt and advised of dr Jenny Jolly instructions/recommendations; pt acknowledged understanding.

## 2019-04-08 NOTE — TELEPHONE ENCOUNTER
Pt states labs showed decreased Kidney Function, he is wondering if Doxazosin Rx is still ok, or if he should take alternate therapy. He also c/o stronger (foul) smell to urine and darker color. Please advise.

## 2019-04-12 DIAGNOSIS — R07.89 CHEST WALL PAIN, CHRONIC: ICD-10-CM

## 2019-04-12 DIAGNOSIS — G89.29 CHEST WALL PAIN, CHRONIC: ICD-10-CM

## 2019-04-12 RX ORDER — GABAPENTIN 300 MG/1
300 CAPSULE ORAL 3 TIMES DAILY
Qty: 90 CAPSULE | Refills: 2 | Status: SHIPPED | OUTPATIENT
Start: 2019-04-12 | End: 2019-08-09 | Stop reason: ALTCHOICE

## 2019-08-09 ENCOUNTER — OFFICE VISIT (OUTPATIENT)
Dept: FAMILY MEDICINE CLINIC | Age: 64
End: 2019-08-09
Payer: MEDICARE

## 2019-08-09 VITALS
HEART RATE: 67 BPM | RESPIRATION RATE: 16 BRPM | BODY MASS INDEX: 28.46 KG/M2 | OXYGEN SATURATION: 99 % | SYSTOLIC BLOOD PRESSURE: 128 MMHG | DIASTOLIC BLOOD PRESSURE: 76 MMHG | WEIGHT: 201.2 LBS

## 2019-08-09 DIAGNOSIS — Z72.0 TOBACCO ABUSE: ICD-10-CM

## 2019-08-09 DIAGNOSIS — Z12.11 SCREENING FOR COLON CANCER: ICD-10-CM

## 2019-08-09 DIAGNOSIS — F51.01 PRIMARY INSOMNIA: ICD-10-CM

## 2019-08-09 DIAGNOSIS — J44.9 CHRONIC OBSTRUCTIVE PULMONARY DISEASE, UNSPECIFIED COPD TYPE (HCC): ICD-10-CM

## 2019-08-09 DIAGNOSIS — F43.10 PTSD (POST-TRAUMATIC STRESS DISORDER): ICD-10-CM

## 2019-08-09 DIAGNOSIS — N18.30 CKD (CHRONIC KIDNEY DISEASE), STAGE III (HCC): ICD-10-CM

## 2019-08-09 DIAGNOSIS — I10 ESSENTIAL HYPERTENSION: Primary | ICD-10-CM

## 2019-08-09 DIAGNOSIS — F07.81 POST CONCUSSION SYNDROME: ICD-10-CM

## 2019-08-09 LAB
CONTROL: NORMAL
HEMOCCULT STL QL: NEGATIVE

## 2019-08-09 PROCEDURE — G0444 DEPRESSION SCREEN ANNUAL: HCPCS | Performed by: FAMILY MEDICINE

## 2019-08-09 PROCEDURE — 82274 ASSAY TEST FOR BLOOD FECAL: CPT | Performed by: FAMILY MEDICINE

## 2019-08-09 PROCEDURE — 36415 COLL VENOUS BLD VENIPUNCTURE: CPT | Performed by: FAMILY MEDICINE

## 2019-08-09 PROCEDURE — 99214 OFFICE O/P EST MOD 30 MIN: CPT | Performed by: FAMILY MEDICINE

## 2019-08-09 ASSESSMENT — ENCOUNTER SYMPTOMS
DIARRHEA: 0
WHEEZING: 0
NAUSEA: 0
SHORTNESS OF BREATH: 0
BACK PAIN: 0
ALLERGIC/IMMUNOLOGIC NEGATIVE: 1
BLOOD IN STOOL: 0
VOMITING: 0
SORE THROAT: 0
RHINORRHEA: 0
CONSTIPATION: 0
COUGH: 0
ABDOMINAL PAIN: 0
CHEST TIGHTNESS: 0
SINUS PRESSURE: 0

## 2019-08-09 ASSESSMENT — PATIENT HEALTH QUESTIONNAIRE - PHQ9
SUM OF ALL RESPONSES TO PHQ9 QUESTIONS 1 & 2: 4
SUM OF ALL RESPONSES TO PHQ QUESTIONS 1-9: 8
4. FEELING TIRED OR HAVING LITTLE ENERGY: 2
1. LITTLE INTEREST OR PLEASURE IN DOING THINGS: 2
10. IF YOU CHECKED OFF ANY PROBLEMS, HOW DIFFICULT HAVE THESE PROBLEMS MADE IT FOR YOU TO DO YOUR WORK, TAKE CARE OF THINGS AT HOME, OR GET ALONG WITH OTHER PEOPLE: 0
7. TROUBLE CONCENTRATING ON THINGS, SUCH AS READING THE NEWSPAPER OR WATCHING TELEVISION: 2
SUM OF ALL RESPONSES TO PHQ QUESTIONS 1-9: 8
6. FEELING BAD ABOUT YOURSELF - OR THAT YOU ARE A FAILURE OR HAVE LET YOURSELF OR YOUR FAMILY DOWN: 0
5. POOR APPETITE OR OVEREATING: 0
3. TROUBLE FALLING OR STAYING ASLEEP: 0
9. THOUGHTS THAT YOU WOULD BE BETTER OFF DEAD, OR OF HURTING YOURSELF: 0
2. FEELING DOWN, DEPRESSED OR HOPELESS: 2
8. MOVING OR SPEAKING SO SLOWLY THAT OTHER PEOPLE COULD HAVE NOTICED. OR THE OPPOSITE, BEING SO FIGETY OR RESTLESS THAT YOU HAVE BEEN MOVING AROUND A LOT MORE THAN USUAL: 0

## 2019-08-10 LAB
ANION GAP SERPL CALCULATED.3IONS-SCNC: 15 MMOL/L (ref 3–16)
BUN BLDV-MCNC: 9 MG/DL (ref 7–20)
CALCIUM SERPL-MCNC: 10.2 MG/DL (ref 8.3–10.6)
CHLORIDE BLD-SCNC: 104 MMOL/L (ref 99–110)
CO2: 25 MMOL/L (ref 21–32)
CREAT SERPL-MCNC: 1.1 MG/DL (ref 0.8–1.3)
GFR AFRICAN AMERICAN: >60
GFR NON-AFRICAN AMERICAN: >60
GLUCOSE BLD-MCNC: 86 MG/DL (ref 70–99)
POTASSIUM SERPL-SCNC: 4.9 MMOL/L (ref 3.5–5.1)
SODIUM BLD-SCNC: 144 MMOL/L (ref 136–145)

## 2019-09-03 RX ORDER — BUPROPION HYDROCHLORIDE 150 MG/1
TABLET ORAL
Qty: 90 TABLET | Refills: 4 | Status: SHIPPED | OUTPATIENT
Start: 2019-09-03 | End: 2020-03-10 | Stop reason: SDUPTHER

## 2019-09-19 DIAGNOSIS — J44.9 CHRONIC OBSTRUCTIVE PULMONARY DISEASE, UNSPECIFIED COPD TYPE (HCC): ICD-10-CM

## 2019-09-19 DIAGNOSIS — R09.81 SINUS CONGESTION: ICD-10-CM

## 2019-09-19 RX ORDER — BUDESONIDE AND FORMOTEROL FUMARATE DIHYDRATE 160; 4.5 UG/1; UG/1
AEROSOL RESPIRATORY (INHALATION)
Qty: 1 INHALER | Refills: 4 | Status: SHIPPED | OUTPATIENT
Start: 2019-09-19 | End: 2020-01-15

## 2019-09-19 RX ORDER — IPRATROPIUM BROMIDE 21 UG/1
SPRAY, METERED NASAL
Qty: 90 ML | Refills: 0 | Status: SHIPPED | OUTPATIENT
Start: 2019-09-19 | End: 2019-11-13 | Stop reason: SDUPTHER

## 2019-10-24 DIAGNOSIS — I73.9 PAD (PERIPHERAL ARTERY DISEASE) (HCC): ICD-10-CM

## 2019-10-24 DIAGNOSIS — F43.10 PTSD (POST-TRAUMATIC STRESS DISORDER): ICD-10-CM

## 2019-10-24 RX ORDER — CLOPIDOGREL BISULFATE 75 MG/1
TABLET ORAL
Qty: 90 TABLET | Refills: 4 | Status: SHIPPED | OUTPATIENT
Start: 2019-10-24 | End: 2020-03-10 | Stop reason: SDUPTHER

## 2019-10-24 RX ORDER — BUSPIRONE HYDROCHLORIDE 15 MG/1
TABLET ORAL
Qty: 270 TABLET | Refills: 4 | Status: SHIPPED | OUTPATIENT
Start: 2019-10-24 | End: 2020-03-10 | Stop reason: SDUPTHER

## 2019-10-24 RX ORDER — PRAVASTATIN SODIUM 80 MG/1
TABLET ORAL
Qty: 90 TABLET | Refills: 4 | Status: SHIPPED | OUTPATIENT
Start: 2019-10-24 | End: 2020-03-10 | Stop reason: SDUPTHER

## 2019-11-05 ENCOUNTER — TELEPHONE (OUTPATIENT)
Dept: FAMILY MEDICINE CLINIC | Age: 64
End: 2019-11-05

## 2019-11-05 DIAGNOSIS — F43.10 PTSD (POST-TRAUMATIC STRESS DISORDER): ICD-10-CM

## 2019-11-13 DIAGNOSIS — F43.10 PTSD (POST-TRAUMATIC STRESS DISORDER): ICD-10-CM

## 2019-11-13 DIAGNOSIS — G89.29 CHEST WALL PAIN, CHRONIC: ICD-10-CM

## 2019-11-13 DIAGNOSIS — R09.81 SINUS CONGESTION: ICD-10-CM

## 2019-11-13 DIAGNOSIS — R07.89 CHEST WALL PAIN, CHRONIC: ICD-10-CM

## 2019-11-13 DIAGNOSIS — R39.9 LOWER URINARY TRACT SYMPTOMS (LUTS): ICD-10-CM

## 2019-11-13 DIAGNOSIS — J44.9 CHRONIC OBSTRUCTIVE PULMONARY DISEASE, UNSPECIFIED COPD TYPE (HCC): ICD-10-CM

## 2019-11-14 RX ORDER — IPRATROPIUM BROMIDE 21 UG/1
2 SPRAY, METERED NASAL 2 TIMES DAILY
Qty: 90 ML | Refills: 0 | Status: SHIPPED | OUTPATIENT
Start: 2019-11-14 | End: 2020-01-15

## 2019-11-14 RX ORDER — DOXAZOSIN MESYLATE 4 MG/1
4 TABLET ORAL DAILY
Qty: 90 TABLET | Refills: 3 | Status: SHIPPED | OUTPATIENT
Start: 2019-11-14 | End: 2020-03-10 | Stop reason: SDUPTHER

## 2019-11-14 RX ORDER — GABAPENTIN 300 MG/1
300 CAPSULE ORAL 3 TIMES DAILY
Qty: 90 CAPSULE | Refills: 2 | OUTPATIENT
Start: 2019-11-14 | End: 2020-02-12

## 2019-12-09 ENCOUNTER — OFFICE VISIT (OUTPATIENT)
Dept: FAMILY MEDICINE CLINIC | Age: 64
End: 2019-12-09
Payer: MEDICARE

## 2019-12-09 VITALS
DIASTOLIC BLOOD PRESSURE: 86 MMHG | BODY MASS INDEX: 30.58 KG/M2 | HEART RATE: 76 BPM | SYSTOLIC BLOOD PRESSURE: 122 MMHG | WEIGHT: 216.2 LBS | RESPIRATION RATE: 16 BRPM

## 2019-12-09 DIAGNOSIS — R07.89 CHEST WALL PAIN, CHRONIC: Primary | ICD-10-CM

## 2019-12-09 DIAGNOSIS — Z72.0 TOBACCO ABUSE: ICD-10-CM

## 2019-12-09 DIAGNOSIS — F43.10 PTSD (POST-TRAUMATIC STRESS DISORDER): ICD-10-CM

## 2019-12-09 DIAGNOSIS — G89.29 CHEST WALL PAIN, CHRONIC: Primary | ICD-10-CM

## 2019-12-09 DIAGNOSIS — N52.01 ERECTILE DYSFUNCTION DUE TO ARTERIAL INSUFFICIENCY: ICD-10-CM

## 2019-12-09 DIAGNOSIS — J44.9 CHRONIC OBSTRUCTIVE PULMONARY DISEASE, UNSPECIFIED COPD TYPE (HCC): ICD-10-CM

## 2019-12-09 DIAGNOSIS — I10 ESSENTIAL HYPERTENSION: ICD-10-CM

## 2019-12-09 PROCEDURE — 99214 OFFICE O/P EST MOD 30 MIN: CPT | Performed by: FAMILY MEDICINE

## 2019-12-09 RX ORDER — SILDENAFIL CITRATE 20 MG/1
20 TABLET ORAL PRN
Qty: 30 TABLET | Refills: 3 | Status: SHIPPED | OUTPATIENT
Start: 2019-12-09 | End: 2020-03-10 | Stop reason: SDUPTHER

## 2019-12-09 RX ORDER — GABAPENTIN 300 MG/1
300 CAPSULE ORAL 3 TIMES DAILY
Qty: 90 CAPSULE | Refills: 2 | Status: SHIPPED | OUTPATIENT
Start: 2019-12-09 | End: 2020-03-10 | Stop reason: SDUPTHER

## 2019-12-09 ASSESSMENT — ENCOUNTER SYMPTOMS
ALLERGIC/IMMUNOLOGIC NEGATIVE: 1
SHORTNESS OF BREATH: 1
WHEEZING: 0
SINUS PRESSURE: 0
CONSTIPATION: 0
BACK PAIN: 0
NAUSEA: 0
RHINORRHEA: 0
CHEST TIGHTNESS: 0
BLOOD IN STOOL: 0
ABDOMINAL PAIN: 0
SORE THROAT: 0
DIARRHEA: 0
VOMITING: 0
COUGH: 0

## 2019-12-09 ASSESSMENT — PATIENT HEALTH QUESTIONNAIRE - PHQ9
SUM OF ALL RESPONSES TO PHQ QUESTIONS 1-9: 0
SUM OF ALL RESPONSES TO PHQ9 QUESTIONS 1 & 2: 0
1. LITTLE INTEREST OR PLEASURE IN DOING THINGS: 0
SUM OF ALL RESPONSES TO PHQ QUESTIONS 1-9: 0
2. FEELING DOWN, DEPRESSED OR HOPELESS: 0

## 2020-01-15 RX ORDER — BUDESONIDE AND FORMOTEROL FUMARATE DIHYDRATE 160; 4.5 UG/1; UG/1
AEROSOL RESPIRATORY (INHALATION)
Qty: 10.2 G | Refills: 10 | Status: SHIPPED | OUTPATIENT
Start: 2020-01-15 | End: 2020-03-10 | Stop reason: SDUPTHER

## 2020-01-15 RX ORDER — IPRATROPIUM BROMIDE 21 UG/1
SPRAY, METERED NASAL
Qty: 90 ML | Refills: 10 | Status: SHIPPED | OUTPATIENT
Start: 2020-01-15 | End: 2020-12-21 | Stop reason: SDUPTHER

## 2020-01-27 ENCOUNTER — CARE COORDINATION (OUTPATIENT)
Dept: CARE COORDINATION | Age: 65
End: 2020-01-27

## 2020-01-31 ENCOUNTER — CARE COORDINATION (OUTPATIENT)
Dept: CARE COORDINATION | Age: 65
End: 2020-01-31

## 2020-02-03 ENCOUNTER — CARE COORDINATION (OUTPATIENT)
Dept: CARE COORDINATION | Age: 65
End: 2020-02-03

## 2020-02-03 NOTE — CARE COORDINATION
Ambulatory Care Coordination Note  CM Risk Score: 2  Charlson 10 Year Mortality Risk Score: 47%     ACC: Miguel George RN    Summary Note: Received call from pt following receipt of letter from [facility]. Agreeable to [facility] outreach and completing assessment on next call. Concerned about finding new provider, advised pcp will still be available for next apptmt on 3/10 and pt plans to call number on back of insurance card to make sure new provider is in network. Denies symptoms/needs at this time, advised to call if any arise. Ambulatory Care Coordination Assessment    Care Coordination Protocol  Program Enrollment:  Rising Risk  Referral from Primary Care Provider:  No  Week 1 - Initial Assessment                             Suggested Interventions and Community Resources                  Prior to Admission medications    Medication Sig Start Date End Date Taking? Authorizing Provider   SYMBICORT 160-4.5 MCG/ACT AERO INHALE 2 PUFFS BY MOUTH TWICE DAILY 1/15/20   Mckenna Hernandez MD   ipratropium (ATROVENT) 0.03 % nasal spray USE 2 SPRAYS INTRANASALLY TWICE DAILY 1/15/20   Mckenna Hernandez MD   VENTOLIN  (90 Base) MCG/ACT inhaler INHALE TWO (2) PUFFS BY MOUTH EVERY 4 TO 6 HOURS AS NEEDED 12/17/19   Mckenna Hernandez MD   gabapentin (NEURONTIN) 300 MG capsule Take 1 capsule by mouth 3 times daily for 90 days.  12/9/19 3/8/20  Mckenna Hernandez MD   sildenafil (REVATIO) 20 MG tablet Take 1 tablet by mouth as needed (ED) 12/9/19   Mckenna Hernandez MD   doxazosin (CARDURA) 4 MG tablet Take 1 tablet by mouth daily 11/14/19   Mckenna Hernandez MD   tiotropium (Janeece Bark) 18 MCG inhalation capsule Inhale 1 capsule into the lungs daily 11/14/19   Mckenna Hernandez MD   busPIRone (BUSPAR) 15 MG tablet TAKE 1 TABLET THREE TIMES A DAY 10/24/19   Mckenna Hernandez MD   pravastatin (PRAVACHOL) 80 MG tablet TAKE 1 TABLET DAILY 10/24/19   Mckenna Hernandez MD   clopidogrel (PLAVIX) 75 MG tablet TAKE 1 TABLET DAILY 10/24/19   Ky Harris MD   buPROPion (WELLBUTRIN XL) 150 MG extended release tablet TAKE 1 TABLET EVERY MORNING 9/3/19   Ky Harris MD   nicotine (NICODERM CQ) 21 MG/24HR Place 1 patch onto the skin every 24 hours 5/30/18 5/30/19  Ky Harris MD   collagenase 250 UNIT/GM ointment Apply topically daily Apply topically daily.  2/7/18   Historical Provider, MD       Future Appointments   Date Time Provider Antony Jen   3/10/2020  1:00 PM Ky Harris MD [facility]      and   General Assessment    Do you have any symptoms that are causing concern?:  No

## 2020-03-06 ENCOUNTER — CARE COORDINATION (OUTPATIENT)
Dept: CARE COORDINATION | Age: 65
End: 2020-03-06

## 2020-03-06 SDOH — HEALTH STABILITY: PHYSICAL HEALTH: ON AVERAGE, HOW MANY MINUTES DO YOU ENGAGE IN EXERCISE AT THIS LEVEL?: 0 MIN

## 2020-03-06 SDOH — ECONOMIC STABILITY: FOOD INSECURITY: WITHIN THE PAST 12 MONTHS, YOU WORRIED THAT YOUR FOOD WOULD RUN OUT BEFORE YOU GOT MONEY TO BUY MORE.: SOMETIMES TRUE

## 2020-03-06 SDOH — ECONOMIC STABILITY: TRANSPORTATION INSECURITY
IN THE PAST 12 MONTHS, HAS LACK OF TRANSPORTATION KEPT YOU FROM MEETINGS, WORK, OR FROM GETTING THINGS NEEDED FOR DAILY LIVING?: NO

## 2020-03-06 SDOH — HEALTH STABILITY: PHYSICAL HEALTH: ON AVERAGE, HOW MANY DAYS PER WEEK DO YOU ENGAGE IN MODERATE TO STRENUOUS EXERCISE (LIKE A BRISK WALK)?: 0 DAYS

## 2020-03-06 SDOH — ECONOMIC STABILITY: INCOME INSECURITY: HOW HARD IS IT FOR YOU TO PAY FOR THE VERY BASICS LIKE FOOD, HOUSING, MEDICAL CARE, AND HEATING?: SOMEWHAT HARD

## 2020-03-06 SDOH — HEALTH STABILITY: MENTAL HEALTH
STRESS IS WHEN SOMEONE FEELS TENSE, NERVOUS, ANXIOUS, OR CAN'T SLEEP AT NIGHT BECAUSE THEIR MIND IS TROUBLED. HOW STRESSED ARE YOU?: VERY MUCH

## 2020-03-06 SDOH — ECONOMIC STABILITY: TRANSPORTATION INSECURITY
IN THE PAST 12 MONTHS, HAS THE LACK OF TRANSPORTATION KEPT YOU FROM MEDICAL APPOINTMENTS OR FROM GETTING MEDICATIONS?: NO

## 2020-03-06 NOTE — CARE COORDINATION
Ambulatory Care Coordination Note  3/6/2020  CM Risk Score: 2  Charlson 10 Year Mortality Risk Score: 47%     ACC: July Bryan RN    Summary Note: Call to pt for acm f/u. Reports he just filled Form out from insurance to receive home delivered meals. Lives with ex-wife. Uses [employer] for meds for prepackaging. Reports I Think my roof gonna fall in ( pt laughing as he said this) and I Dont have a furnace. Using 4 electric heaters going to keep warm, thinks he can pay heating bill, with assistance from [employer]. Aptmt manager is working on getting furnace repaired. Rarely worries about food, but goes to food pantries when needed. Declines social work referral, reports wants to wait another week and see how things are. Med rec completed. Only discrepancy is that he is Taking 2 15 mg buspar together in the morning feels like the 15 mg is not effective, only takes it once in the morning so that he  Doesn't get drowsy later in the day. Reminded pt of pcp pv on 3/10. PLan: will attempt to see pt at pcp ov on 3/10 and follow up with pt on barriers /needs and will continue to support/educate and work to set goal with pt. Ambulatory Care Coordination Assessment    Care Coordination Protocol  Program Enrollment:  Rising Risk  Referral from Primary Care Provider:  No  Week 1 - Initial Assessment     Do you have all of your prescriptions and are they filled?:  Yes  Barriers to medication adherence:  None  Are you able to afford your medications?:  Yes  How often do you have trouble taking your medications the way you have been told to take them?:  I always take them as prescribed. Do you have Home O2 Therapy?:  No      Ability to seek help/take action for Emergent Urgent situations i.e. fire, crime, inclement weather or health crisis. :  Independent  Ability to ambulate to restroom:  Independent  Ability handle personal hygeine needs (bathing/dressing/grooming):   Independent  Ability to manage Medications: busPIRone (BUSPAR) 15 MG tablet TAKE 1 TABLET THREE TIMES A DAY 10/24/19  Yes Lizet Moyer MD   pravastatin (PRAVACHOL) 80 MG tablet TAKE 1 TABLET DAILY 10/24/19  Yes Lizet Moyer MD   clopidogrel (PLAVIX) 75 MG tablet TAKE 1 TABLET DAILY 10/24/19  Yes Lizet Moyer MD   buPROPion (WELLBUTRIN XL) 150 MG extended release tablet TAKE 1 TABLET EVERY MORNING 9/3/19  Yes Lizet Moyer MD   sildenafil (REVATIO) 20 MG tablet Take 1 tablet by mouth as needed (ED)  Patient not taking: Reported on 3/6/2020 12/9/19   Lizet Moyer MD   nicotine (NICODERM CQ) 21 MG/24HR Place 1 patch onto the skin every 24 hours 5/30/18 5/30/19  Lizet Moyer MD   collagenase 250 UNIT/GM ointment Apply topically daily Apply topically daily.  2/7/18   Historical Provider, MD       Future Appointments   Date Time Provider Antony Li   3/10/2020  1:00 PM Lizet Moyer MD [facility] PEDS [facility]     ,   COPD Assessment    Does the patient understand envrionmental exposure?:  Yes  Is the patient able to verbalize Rescue vs. Long Acting medications?:  Yes  Does the patient have a nebulizer?:  No  Does the patient use a space with inhaled medications?:  No     No patient-reported symptoms         Symptoms:          and   General Assessment    Do you have any symptoms that are causing concern?:  No

## 2020-03-10 ENCOUNTER — CARE COORDINATION (OUTPATIENT)
Dept: CARE COORDINATION | Age: 65
End: 2020-03-10

## 2020-03-10 ENCOUNTER — OFFICE VISIT (OUTPATIENT)
Dept: FAMILY MEDICINE CLINIC | Age: 65
End: 2020-03-10
Payer: MEDICARE

## 2020-03-10 VITALS
HEART RATE: 69 BPM | SYSTOLIC BLOOD PRESSURE: 118 MMHG | OXYGEN SATURATION: 96 % | BODY MASS INDEX: 31.18 KG/M2 | DIASTOLIC BLOOD PRESSURE: 74 MMHG | WEIGHT: 220.4 LBS | RESPIRATION RATE: 16 BRPM

## 2020-03-10 DIAGNOSIS — Z13.220 SCREENING FOR HYPERLIPIDEMIA: ICD-10-CM

## 2020-03-10 DIAGNOSIS — I10 ESSENTIAL HYPERTENSION: ICD-10-CM

## 2020-03-10 DIAGNOSIS — I73.9 PAD (PERIPHERAL ARTERY DISEASE) (HCC): ICD-10-CM

## 2020-03-10 LAB
A/G RATIO: 1.3 (ref 1.1–2.2)
ALBUMIN SERPL-MCNC: 4 G/DL (ref 3.4–5)
ALP BLD-CCNC: 62 U/L (ref 40–129)
ALT SERPL-CCNC: 12 U/L (ref 10–40)
ANION GAP SERPL CALCULATED.3IONS-SCNC: 13 MMOL/L (ref 3–16)
AST SERPL-CCNC: 15 U/L (ref 15–37)
BILIRUB SERPL-MCNC: 0.5 MG/DL (ref 0–1)
BUN BLDV-MCNC: 12 MG/DL (ref 7–20)
CALCIUM SERPL-MCNC: 9.5 MG/DL (ref 8.3–10.6)
CHLORIDE BLD-SCNC: 105 MMOL/L (ref 99–110)
CHOLESTEROL, TOTAL: 153 MG/DL (ref 0–199)
CO2: 24 MMOL/L (ref 21–32)
CREAT SERPL-MCNC: 1.1 MG/DL (ref 0.8–1.3)
GFR AFRICAN AMERICAN: >60
GFR NON-AFRICAN AMERICAN: >60
GLOBULIN: 3.2 G/DL
GLUCOSE BLD-MCNC: 91 MG/DL (ref 70–99)
HDLC SERPL-MCNC: 30 MG/DL (ref 40–60)
LDL CHOLESTEROL CALCULATED: 91 MG/DL
POTASSIUM SERPL-SCNC: 4.3 MMOL/L (ref 3.5–5.1)
SODIUM BLD-SCNC: 142 MMOL/L (ref 136–145)
TOTAL PROTEIN: 7.2 G/DL (ref 6.4–8.2)
TRIGL SERPL-MCNC: 161 MG/DL (ref 0–150)
VLDLC SERPL CALC-MCNC: 32 MG/DL

## 2020-03-10 PROCEDURE — 99214 OFFICE O/P EST MOD 30 MIN: CPT | Performed by: FAMILY MEDICINE

## 2020-03-10 RX ORDER — CLOPIDOGREL BISULFATE 75 MG/1
75 TABLET ORAL DAILY
Qty: 90 TABLET | Refills: 4 | Status: SHIPPED | OUTPATIENT
Start: 2020-03-10 | End: 2020-11-19 | Stop reason: SDUPTHER

## 2020-03-10 RX ORDER — DOXAZOSIN MESYLATE 4 MG/1
4 TABLET ORAL DAILY
Qty: 90 TABLET | Refills: 3 | Status: SHIPPED | OUTPATIENT
Start: 2020-03-10 | End: 2020-10-26 | Stop reason: SDUPTHER

## 2020-03-10 RX ORDER — BUDESONIDE AND FORMOTEROL FUMARATE DIHYDRATE 160; 4.5 UG/1; UG/1
2 AEROSOL RESPIRATORY (INHALATION) 2 TIMES DAILY
Qty: 30.6 G | Refills: 3 | Status: SHIPPED | OUTPATIENT
Start: 2020-03-10 | End: 2020-12-21 | Stop reason: SDUPTHER

## 2020-03-10 RX ORDER — PRAVASTATIN SODIUM 80 MG/1
80 TABLET ORAL DAILY
Qty: 90 TABLET | Refills: 4 | Status: SHIPPED | OUTPATIENT
Start: 2020-03-10 | End: 2020-11-19 | Stop reason: SDUPTHER

## 2020-03-10 RX ORDER — SILDENAFIL CITRATE 20 MG/1
20 TABLET ORAL PRN
Qty: 30 TABLET | Refills: 3 | Status: SHIPPED | OUTPATIENT
Start: 2020-03-10 | End: 2020-06-24 | Stop reason: ALTCHOICE

## 2020-03-10 RX ORDER — BUPROPION HYDROCHLORIDE 150 MG/1
150 TABLET ORAL EVERY MORNING
Qty: 90 TABLET | Refills: 4 | Status: SHIPPED | OUTPATIENT
Start: 2020-03-10 | End: 2020-11-19 | Stop reason: SDUPTHER

## 2020-03-10 RX ORDER — BUSPIRONE HYDROCHLORIDE 15 MG/1
TABLET ORAL
Qty: 270 TABLET | Refills: 4 | Status: SHIPPED | OUTPATIENT
Start: 2020-03-10 | End: 2020-06-24 | Stop reason: SDUPTHER

## 2020-03-10 RX ORDER — GABAPENTIN 300 MG/1
300 CAPSULE ORAL 3 TIMES DAILY
Qty: 90 CAPSULE | Refills: 2 | Status: SHIPPED | OUTPATIENT
Start: 2020-03-10 | End: 2020-06-24 | Stop reason: SDUPTHER

## 2020-03-10 RX ORDER — TIOTROPIUM BROMIDE 18 UG/1
18 CAPSULE ORAL; RESPIRATORY (INHALATION) DAILY
Qty: 90 CAPSULE | Refills: 3 | Status: SHIPPED | OUTPATIENT
Start: 2020-03-10 | End: 2020-10-26 | Stop reason: SDUPTHER

## 2020-03-10 ASSESSMENT — ENCOUNTER SYMPTOMS
VOMITING: 0
CONSTIPATION: 0
SHORTNESS OF BREATH: 0
WHEEZING: 0
BLOOD IN STOOL: 0
ALLERGIC/IMMUNOLOGIC NEGATIVE: 1
DIARRHEA: 0
CHEST TIGHTNESS: 0
SINUS PRESSURE: 0
SORE THROAT: 0
NAUSEA: 0
RHINORRHEA: 0
COUGH: 0
BACK PAIN: 0
ABDOMINAL PAIN: 0

## 2020-03-10 ASSESSMENT — PATIENT HEALTH QUESTIONNAIRE - PHQ9
SUM OF ALL RESPONSES TO PHQ QUESTIONS 1-9: 0
SUM OF ALL RESPONSES TO PHQ QUESTIONS 1-9: 0
2. FEELING DOWN, DEPRESSED OR HOPELESS: 0
SUM OF ALL RESPONSES TO PHQ9 QUESTIONS 1 & 2: 0
1. LITTLE INTEREST OR PLEASURE IN DOING THINGS: 0

## 2020-03-10 NOTE — PROGRESS NOTES
hematuria and urgency. Erectile dysfunction   Musculoskeletal: Negative for arthralgias, back pain and joint swelling. Skin: Negative for rash. Allergic/Immunologic: Negative. Neurological: Negative for weakness, numbness and headaches. History of head injury with chronic memory problems and slowed cognition   Hematological: Negative. Psychiatric/Behavioral: Positive for decreased concentration and dysphoric mood. Negative for agitation, behavioral problems, confusion, hallucinations, self-injury, sleep disturbance and suicidal ideas. The patient is nervous/anxious. The patient is not hyperactive. All other systems reviewed and are negative. Prior to Visit Medications    Medication Sig Taking? Authorizing Provider   SYMBICORT 160-4.5 MCG/ACT AERO INHALE 2 PUFFS BY MOUTH TWICE DAILY Yes Nolan Villa MD   ipratropium (ATROVENT) 0.03 % nasal spray USE 2 SPRAYS INTRANASALLY TWICE DAILY Yes Nolan Villa MD   VENTOLIN  (90 Base) MCG/ACT inhaler INHALE TWO (2) PUFFS BY MOUTH EVERY 4 TO 6 HOURS AS NEEDED Yes Nolan Villa MD   gabapentin (NEURONTIN) 300 MG capsule Take 1 capsule by mouth 3 times daily for 90 days.  Yes Nolan Villa MD   sildenafil (REVATIO) 20 MG tablet Take 1 tablet by mouth as needed (ED) Yes Nolan Villa MD   doxazosin (CARDURA) 4 MG tablet Take 1 tablet by mouth daily Yes Nolan Villa MD   tiotropium (Jr Baptise) 18 MCG inhalation capsule Inhale 1 capsule into the lungs daily Yes Nolan Villa MD   busPIRone (BUSPAR) 15 MG tablet TAKE 1 TABLET THREE TIMES A DAY  Patient taking differently: Taking 2 tabs only in am Yes Nolan Villa MD   pravastatin (PRAVACHOL) 80 MG tablet TAKE 1 TABLET DAILY Yes Nolan Villa MD   clopidogrel (PLAVIX) 75 MG tablet TAKE 1 TABLET DAILY Yes Nolan Villa MD   buPROPion (WELLBUTRIN XL) 150 MG extended release tablet TAKE 1 TABLET EVERY MORNING Yes Nolan Villa MD   collagenase 250 UNIT/GM ointment Apply topically daily Apply topically daily. Yes Historical Provider, MD        Social History     Tobacco Use    Smoking status: Current Every Day Smoker     Packs/day: 1.50     Years: 45.00     Pack years: 67.50     Types: Cigarettes     Start date: 1/1/1972    Smokeless tobacco: Never Used   Substance Use Topics    Alcohol use: No     Comment: no etoh since 2015 ( hx heavy)        Vitals:    03/10/20 1301   BP: 118/74   Pulse: 69   Resp: 16   SpO2: 96%   Weight: 220 lb 6.4 oz (100 kg)     Estimated body mass index is 31.18 kg/m² as calculated from the following:    Height as of 3/19/19: 5' 10.5\" (1.791 m). Weight as of this encounter: 220 lb 6.4 oz (100 kg). Physical Exam  Constitutional:       Appearance: He is well-developed. HENT:      Head: Normocephalic and atraumatic. Right Ear: External ear normal.      Left Ear: External ear normal.      Mouth/Throat:      Mouth: Mucous membranes are moist.      Pharynx: Oropharynx is clear. Eyes:      Conjunctiva/sclera: Conjunctivae normal.   Neck:      Musculoskeletal: Normal range of motion and neck supple. Thyroid: No thyromegaly. Vascular: No JVD. Trachea: No tracheal deviation. Cardiovascular:      Rate and Rhythm: Normal rate and regular rhythm. Heart sounds: Normal heart sounds. Comments: Absent distal pulses bilateral DP and PT  Pulmonary:      Effort: Pulmonary effort is normal.      Breath sounds: Normal breath sounds. No wheezing or rales. Abdominal:      General: Bowel sounds are normal.      Palpations: Abdomen is soft. There is no mass. Musculoskeletal: Normal range of motion. General: Tenderness present. Comments: Left leg swelling with scarring in the left groin and left medial calf consistent with previous vascular surgery.   Skin is shiny 2+ edema on the left side leg normal appearing right leg  Left lower extremity atrophy compared to right   Lymphadenopathy:

## 2020-03-10 NOTE — CARE COORDINATION
Ambulatory Care Coordination Note  CM Risk Score: 2  Charlson 10 Year Mortality Risk Score: 47%     ACC: Cong Longo RN    Summary Note: Met with pt at pcp ov. C/o back pain today. Provided information on community action partnership for possible utility/home assistance. Declines social work referral. Reviewed copd zm and calling pcp with any needs/symptoms. Advised to call with any needs, will continue to follow to educate and support. Care Coordination Interventions    Program Enrollment:  Rising Risk  Referral from Primary Care Provider:  No  Suggested Interventions and [employer]: In Process  Social Work:  Declined  Zone Management Tools: In Process         Goals Addressed                 This Visit's Progress     Conditions and Symptoms        I will notify my provider of any barriers to my plan of care. I will notify my provider of any symptoms that indicate a worsening of my condition. Barriers: lack of motivation, lack of support and overwhelmed by complexity of regimen  Plan for overcoming my barriers: education and support from ac  Confidence: 8/10  Anticipated Goal Completion Date: 5/16/20              Prior to Admission medications    Medication Sig Start Date End Date Taking? Authorizing Provider   gabapentin (NEURONTIN) 300 MG capsule Take 1 capsule by mouth 3 times daily for 90 days.  3/10/20 6/8/20  Regino Diego MD   clopidogrel (PLAVIX) 75 MG tablet Take 1 tablet by mouth daily 3/10/20   Regino Diego MD   sildenafil (REVATIO) 20 MG tablet Take 1 tablet by mouth as needed (ED) 3/10/20   Regino Diego MD   doxazosin (CARDURA) 4 MG tablet Take 1 tablet by mouth daily 3/10/20   Regino Diego MD   pravastatin (PRAVACHOL) 80 MG tablet Take 1 tablet by mouth daily 3/10/20   Regino Diego MD   buPROPion (WELLBUTRIN XL) 150 MG extended release tablet Take 1 tablet by mouth every morning 3/10/20   Regino Diego MD   busPIRone (BUSPAR) 15 MG tablet TAKE 1 TABLET THREE TIMES A DAY 3/10/20   Ky Harris MD   budesonide-formoterol [facility]) 160-4.5 MCG/ACT AERO Inhale 2 puffs into the lungs 2 times daily 3/10/20   Ky Harirs MD   tiotropium (Jeanell Prima) 18 MCG inhalation capsule Inhale 1 capsule into the lungs daily 3/10/20   Ky Harris MD   ipratropium (ATROVENT) 0.03 % nasal spray USE 2 SPRAYS INTRANASALLY TWICE DAILY 1/15/20   Ky Harris MD   VENTOLIN  (90 Base) MCG/ACT inhaler INHALE TWO (2) PUFFS BY MOUTH EVERY 4 TO 6 HOURS AS NEEDED 12/17/19   Ky Harris MD   collagenase 250 UNIT/GM ointment Apply topically daily Apply topically daily. 2/7/18   Historical Provider, MD       No future appointments.    and   COPD Assessment    Does the patient understand envrionmental exposure?:  Yes  Is the patient able to verbalize Rescue vs. Long Acting medications?:  Yes  Does the patient have a nebulizer?:  No  Does the patient use a space with inhaled medications?:  No     No patient-reported symptoms         Symptoms:

## 2020-03-12 ENCOUNTER — TELEPHONE (OUTPATIENT)
Dept: FAMILY MEDICINE CLINIC | Age: 65
End: 2020-03-12

## 2020-06-24 ENCOUNTER — OFFICE VISIT (OUTPATIENT)
Dept: INTERNAL MEDICINE CLINIC | Age: 65
End: 2020-06-24
Payer: MEDICARE

## 2020-06-24 VITALS
HEART RATE: 68 BPM | SYSTOLIC BLOOD PRESSURE: 122 MMHG | BODY MASS INDEX: 31.3 KG/M2 | HEIGHT: 70 IN | RESPIRATION RATE: 16 BRPM | DIASTOLIC BLOOD PRESSURE: 76 MMHG | WEIGHT: 218.6 LBS | TEMPERATURE: 98.4 F | OXYGEN SATURATION: 96 %

## 2020-06-24 PROCEDURE — 99203 OFFICE O/P NEW LOW 30 MIN: CPT | Performed by: INTERNAL MEDICINE

## 2020-06-24 RX ORDER — GABAPENTIN 300 MG/1
300 CAPSULE ORAL NIGHTLY
Qty: 30 CAPSULE | Refills: 3 | Status: SHIPPED | OUTPATIENT
Start: 2020-06-24 | End: 2020-10-07

## 2020-06-24 RX ORDER — BUSPIRONE HYDROCHLORIDE 15 MG/1
15 TABLET ORAL 2 TIMES DAILY
Qty: 60 TABLET | Refills: 3 | Status: SHIPPED | OUTPATIENT
Start: 2020-06-24 | End: 2020-11-09

## 2020-06-24 ASSESSMENT — ENCOUNTER SYMPTOMS
EYES NEGATIVE: 1
SHORTNESS OF BREATH: 0
COUGH: 0
RESPIRATORY NEGATIVE: 1
ALLERGIC/IMMUNOLOGIC NEGATIVE: 1

## 2020-06-24 NOTE — ASSESSMENT & PLAN NOTE
Dx with COPD. Chronic smoker. Patient is on Symbicort 160/4.5. Spriva and albuterol inhaler prn  Advised patient to quit smoking.   Needs PFT

## 2020-06-24 NOTE — PROGRESS NOTES
SURGERY      stent L leg 2013     Social History     Tobacco Use    Smoking status: Current Every Day Smoker     Packs/day: 1.50     Years: 50.00     Pack years: 75.00     Types: Cigarettes     Start date: 1/1/1972    Smokeless tobacco: Never Used   Substance Use Topics    Alcohol use: No     Comment: no etoh since 2015 ( hx heavy)       LAB REVIEW:  CBC:   Lab Results   Component Value Date    WBC 8.3 09/19/2017    HGB 14.9 09/19/2017    HCT 47.6 09/19/2017     09/19/2017     Lipids:   Lab Results   Component Value Date    CHOL 153 03/10/2020    TRIG 161 (H) 03/10/2020    HDL 30 (L) 03/10/2020    LDLCALC 91 03/10/2020    LDLDIRECT 87 09/19/2017    TRIGLYCFAST 182 (H) 09/19/2017     Renal:   Lab Results   Component Value Date    BUN 12 03/10/2020    CREATININE 1.1 03/10/2020     03/10/2020    K 4.3 03/10/2020    ALT 12 03/10/2020    AST 15 03/10/2020    GLUCOSE 91 03/10/2020     PT/INR: No results found for: INR  A1C:   Lab Results   Component Value Date    LABA1C 5.3 09/19/2017           Sera Tuttle MD, 6/24/2020 , 3:24 PM

## 2020-06-24 NOTE — ASSESSMENT & PLAN NOTE
Pt had left iliac PTCA and stent left iliac artery and fem = tibial bypass which failed and alter repaired.  Has chronic edema since then

## 2020-10-07 RX ORDER — GABAPENTIN 300 MG/1
300 CAPSULE ORAL NIGHTLY
Qty: 30 CAPSULE | Refills: 3 | Status: SHIPPED | OUTPATIENT
Start: 2020-10-07 | End: 2021-02-09

## 2020-10-26 RX ORDER — TIOTROPIUM BROMIDE 18 UG/1
18 CAPSULE ORAL; RESPIRATORY (INHALATION) DAILY
Qty: 90 CAPSULE | Refills: 3 | Status: SHIPPED | OUTPATIENT
Start: 2020-10-26 | End: 2021-10-13

## 2020-10-26 RX ORDER — DOXAZOSIN MESYLATE 4 MG/1
4 TABLET ORAL DAILY
Qty: 90 TABLET | Refills: 3 | Status: SHIPPED | OUTPATIENT
Start: 2020-10-26 | End: 2021-02-08

## 2020-11-03 ENCOUNTER — OFFICE VISIT (OUTPATIENT)
Dept: INTERNAL MEDICINE CLINIC | Age: 65
End: 2020-11-03
Payer: MEDICARE

## 2020-11-03 VITALS
TEMPERATURE: 98.2 F | SYSTOLIC BLOOD PRESSURE: 118 MMHG | DIASTOLIC BLOOD PRESSURE: 70 MMHG | WEIGHT: 212.6 LBS | HEART RATE: 84 BPM | BODY MASS INDEX: 30.95 KG/M2 | OXYGEN SATURATION: 93 % | RESPIRATION RATE: 16 BRPM

## 2020-11-03 PROBLEM — F32.A ANXIETY AND DEPRESSION: Status: ACTIVE | Noted: 2020-11-03

## 2020-11-03 PROBLEM — F41.9 ANXIETY AND DEPRESSION: Status: ACTIVE | Noted: 2020-11-03

## 2020-11-03 PROBLEM — E78.2 MIXED HYPERLIPIDEMIA: Status: ACTIVE | Noted: 2020-11-03

## 2020-11-03 PROBLEM — G25.81 RLS (RESTLESS LEGS SYNDROME): Status: ACTIVE | Noted: 2020-11-03

## 2020-11-03 PROCEDURE — 99214 OFFICE O/P EST MOD 30 MIN: CPT | Performed by: INTERNAL MEDICINE

## 2020-11-03 ASSESSMENT — ENCOUNTER SYMPTOMS
ALLERGIC/IMMUNOLOGIC NEGATIVE: 1
RHINORRHEA: 1
GASTROINTESTINAL NEGATIVE: 1
SHORTNESS OF BREATH: 0
RESPIRATORY NEGATIVE: 1
WHEEZING: 0
EYES NEGATIVE: 1
COUGH: 0

## 2020-11-03 NOTE — ASSESSMENT & PLAN NOTE
Pt had left iliac PTCA and stent left iliac artery and fem - tibial bypass which failed and later repaired. Has chronic edema since then.

## 2020-11-03 NOTE — ASSESSMENT & PLAN NOTE
Hypertension in control. Follow low salt diet. Continue current treatment. Continue dozazosin 4 mg daily.
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
## 2020-11-19 RX ORDER — PRAVASTATIN SODIUM 80 MG/1
80 TABLET ORAL DAILY
Qty: 90 TABLET | Refills: 4 | Status: SHIPPED | OUTPATIENT
Start: 2020-11-19 | End: 2021-02-08 | Stop reason: SDUPTHER

## 2020-11-19 RX ORDER — CLOPIDOGREL BISULFATE 75 MG/1
75 TABLET ORAL DAILY
Qty: 90 TABLET | Refills: 4 | Status: SHIPPED | OUTPATIENT
Start: 2020-11-19 | End: 2021-01-14 | Stop reason: SDUPTHER

## 2020-11-19 RX ORDER — BUPROPION HYDROCHLORIDE 150 MG/1
150 TABLET ORAL EVERY MORNING
Qty: 90 TABLET | Refills: 4 | Status: SHIPPED | OUTPATIENT
Start: 2020-11-19 | End: 2021-02-08 | Stop reason: ALTCHOICE

## 2020-11-19 NOTE — TELEPHONE ENCOUNTER
[facility] called to request the refills that patient is needing.

## 2020-11-30 ENCOUNTER — TELEPHONE (OUTPATIENT)
Dept: INTERNAL MEDICINE CLINIC | Age: 65
End: 2020-11-30

## 2020-11-30 NOTE — TELEPHONE ENCOUNTER
Pt called leaving a message on voicemail that he was out of his hyperlipidemia med and his blood thinner for 3 weeks. Pt stated on voicemail not to send meds in because he was stopping all of his meds. I called and informed pt that the medication was sent to his pharmacy and that he would need to call them for when he would get them. Pt stated he has not taken any of his meds in 3 weeks and he feels fine. He stated he will not take his meds. Pt informed that the doctor would want him to continue taking his medication. Pt stated he understood but was not going to take them. Pt was stressed to take medications again with the same result.

## 2020-12-02 ENCOUNTER — TELEPHONE (OUTPATIENT)
Dept: INTERNAL MEDICINE CLINIC | Age: 65
End: 2020-12-02

## 2020-12-02 NOTE — TELEPHONE ENCOUNTER
Spoke with patient to ask if he has changed his mind on taking his medication. States that he hasnt had his meds for a month. Informed him that the meds were sent to the pharmacy in November with refills. He will call [facility] tomorrow to find out why is does not have them and will then let our office know what he is intending to do.

## 2020-12-02 NOTE — TELEPHONE ENCOUNTER
Spoke with patient to ask if he has changed his mind on taking his medication. States that he hasnt had his meds for a month. Informed him that the meds were sent to the pharmacy in November with refills. He will call [facility] tomorrow to find out why is does not have them and will then let our office know what he is intending to do.

## 2020-12-02 NOTE — TELEPHONE ENCOUNTER
Call patient and check if he changed his mind and will take medications  Inform patient we are concerned about him stopping medications.

## 2020-12-04 ENCOUNTER — CARE COORDINATION (OUTPATIENT)
Dept: CARE COORDINATION | Age: 65
End: 2020-12-04

## 2020-12-08 ENCOUNTER — CARE COORDINATION (OUTPATIENT)
Dept: CARE COORDINATION | Age: 65
End: 2020-12-08

## 2020-12-18 ENCOUNTER — CARE COORDINATION (OUTPATIENT)
Dept: CARE COORDINATION | Age: 65
End: 2020-12-18

## 2020-12-18 NOTE — CARE COORDINATION
.Rcvd call from pt in r/t meds. Reports he hasnt taken plavix in 2 months due to pharmacy. Plans to start retaking once he gets it, pharmacy plans on calling dr Flora Oh office to clarify script prior to sending to pt. Pt reports he talked to pharmacy this afternoon. Inquired as to why pt is not taking meds as ordered and pt reports he is Not taking them as my blood pressure is fine and my nerves are better. Reports Anxiety and depression are improved. Worried about side effects of statin and this acm reiterated benefits, pt is wanting natural remedies, taking teaspoon of olive oil in morning he reports. Not willing to restart statin. Still smoking. Reiterated high risk of smoking, edwina with not taking statins or plavix and risk of adverse events. Discussed smoking cessation and educated on benefits. Pt not interested in [employer]. Provided [place] quit now line. Will continue to follow. Pt denies symptoms of concern at this time. Reviewed copd zm.

## 2020-12-21 RX ORDER — BUDESONIDE AND FORMOTEROL FUMARATE DIHYDRATE 160; 4.5 UG/1; UG/1
2 AEROSOL RESPIRATORY (INHALATION) 2 TIMES DAILY
Qty: 30.6 G | Refills: 3 | Status: SHIPPED | OUTPATIENT
Start: 2020-12-21 | End: 2021-05-05 | Stop reason: SDUPTHER

## 2020-12-21 RX ORDER — IPRATROPIUM BROMIDE 21 UG/1
SPRAY, METERED NASAL
Qty: 90 ML | Refills: 10 | Status: SHIPPED | OUTPATIENT
Start: 2020-12-21 | End: 2022-01-04

## 2020-12-21 NOTE — TELEPHONE ENCOUNTER
Spoke with patient and informed of medication sent in Nov. He states that he found plavix at home and has started taking it. Offered earlier appt with Dr and patient refused. \"I'll just wait\". Reminded to call office with any other questions. Verbal understanding returned.

## 2020-12-21 NOTE — TELEPHONE ENCOUNTER
Medications were sent exact pharmacy in November 2020  Patient needs new refills to be sent to local pharmacy let me know. He can come earlier and talk to me about the purpose of all the medications and I fully explained to him.

## 2021-01-14 DIAGNOSIS — I73.9 PAD (PERIPHERAL ARTERY DISEASE) (HCC): ICD-10-CM

## 2021-01-14 NOTE — TELEPHONE ENCOUNTER
Refill request, stressed importance of keeping appts with Dr. Gokul Johnson and will send in a 30 day supply.  Follow up 2-3-2021

## 2021-01-15 RX ORDER — CLOPIDOGREL BISULFATE 75 MG/1
75 TABLET ORAL DAILY
Qty: 30 TABLET | Refills: 0 | Status: SHIPPED | OUTPATIENT
Start: 2021-01-15 | End: 2021-01-27 | Stop reason: SDUPTHER

## 2021-01-27 DIAGNOSIS — I73.9 PAD (PERIPHERAL ARTERY DISEASE) (HCC): ICD-10-CM

## 2021-01-27 RX ORDER — CLOPIDOGREL BISULFATE 75 MG/1
75 TABLET ORAL DAILY
Qty: 90 TABLET | Refills: 0 | Status: SHIPPED | OUTPATIENT
Start: 2021-01-27 | End: 2021-02-15 | Stop reason: SDUPTHER

## 2021-02-08 ENCOUNTER — VIRTUAL VISIT (OUTPATIENT)
Dept: INTERNAL MEDICINE CLINIC | Age: 66
End: 2021-02-08
Payer: MEDICARE

## 2021-02-08 DIAGNOSIS — J44.9 CHRONIC OBSTRUCTIVE PULMONARY DISEASE, UNSPECIFIED COPD TYPE (HCC): ICD-10-CM

## 2021-02-08 DIAGNOSIS — F32.A ANXIETY AND DEPRESSION: ICD-10-CM

## 2021-02-08 DIAGNOSIS — Z72.0 TOBACCO ABUSE: ICD-10-CM

## 2021-02-08 DIAGNOSIS — E78.2 MIXED HYPERLIPIDEMIA: ICD-10-CM

## 2021-02-08 DIAGNOSIS — R60.0 LEG EDEMA, LEFT: ICD-10-CM

## 2021-02-08 DIAGNOSIS — I10 ESSENTIAL HYPERTENSION: ICD-10-CM

## 2021-02-08 DIAGNOSIS — F41.9 ANXIETY AND DEPRESSION: ICD-10-CM

## 2021-02-08 DIAGNOSIS — G25.81 RLS (RESTLESS LEGS SYNDROME): ICD-10-CM

## 2021-02-08 DIAGNOSIS — F07.81 POST CONCUSSION SYNDROME: ICD-10-CM

## 2021-02-08 DIAGNOSIS — I73.9 PAD (PERIPHERAL ARTERY DISEASE) (HCC): ICD-10-CM

## 2021-02-08 PROCEDURE — 99214 OFFICE O/P EST MOD 30 MIN: CPT | Performed by: INTERNAL MEDICINE

## 2021-02-08 RX ORDER — PRAVASTATIN SODIUM 80 MG/1
80 TABLET ORAL DAILY
Qty: 90 TABLET | Refills: 1 | Status: SHIPPED | OUTPATIENT
Start: 2021-02-08 | End: 2021-02-12 | Stop reason: SDUPTHER

## 2021-02-08 NOTE — ASSESSMENT & PLAN NOTE
Patient has COPD. He is taking only Symbicort and albuterol inhaler. He does not want to take Spiriva. We will discontinue that. Advised patient to stop smoking.   Options given

## 2021-02-08 NOTE — ASSESSMENT & PLAN NOTE
Patient has a history of hypertension and was taking doxazosin which he stopped taking it. Patient states his blood pressure is normal at home and he does not want to take it.
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
## 2021-02-08 NOTE — ASSESSMENT & PLAN NOTE
History of hyperlipidemia but patient is not taking statins. He is noncompliant. Patient states he had thought about things about statins. Advised patient to start taking pravastatin as he has history of peripheral vascular disease. Patient states he will start taking it.

## 2021-02-08 NOTE — ASSESSMENT & PLAN NOTE
Pt had left iliac PTCA and stent left iliac artery and fem - tibial bypass which failed and later repaired. Has chronic edema since then. Advised patient to take Plavix and statins.

## 2021-02-09 DIAGNOSIS — R07.89 CHEST WALL PAIN, CHRONIC: ICD-10-CM

## 2021-02-09 DIAGNOSIS — G89.29 CHEST WALL PAIN, CHRONIC: ICD-10-CM

## 2021-02-09 RX ORDER — GABAPENTIN 300 MG/1
300 CAPSULE ORAL NIGHTLY
Qty: 30 CAPSULE | Refills: 3 | Status: SHIPPED | OUTPATIENT
Start: 2021-02-09 | End: 2021-02-12 | Stop reason: SDUPTHER

## 2021-02-12 DIAGNOSIS — I73.9 PAD (PERIPHERAL ARTERY DISEASE) (HCC): ICD-10-CM

## 2021-02-12 DIAGNOSIS — G89.29 CHEST WALL PAIN, CHRONIC: ICD-10-CM

## 2021-02-12 DIAGNOSIS — R07.89 CHEST WALL PAIN, CHRONIC: ICD-10-CM

## 2021-02-15 RX ORDER — CLOPIDOGREL BISULFATE 75 MG/1
75 TABLET ORAL DAILY
Qty: 90 TABLET | Refills: 0 | Status: SHIPPED | OUTPATIENT
Start: 2021-02-15 | End: 2021-02-16 | Stop reason: SDUPTHER

## 2021-02-15 RX ORDER — PRAVASTATIN SODIUM 80 MG/1
80 TABLET ORAL DAILY
Qty: 90 TABLET | Refills: 0 | Status: SHIPPED | OUTPATIENT
Start: 2021-02-15 | End: 2021-07-06

## 2021-02-15 RX ORDER — GABAPENTIN 300 MG/1
300 CAPSULE ORAL NIGHTLY
Qty: 30 CAPSULE | Refills: 0 | Status: SHIPPED | OUTPATIENT
Start: 2021-02-15 | End: 2021-06-03

## 2021-02-16 DIAGNOSIS — I73.9 PAD (PERIPHERAL ARTERY DISEASE) (HCC): ICD-10-CM

## 2021-02-17 ENCOUNTER — CARE COORDINATION (OUTPATIENT)
Dept: CARE COORDINATION | Age: 66
End: 2021-02-17

## 2021-02-17 RX ORDER — CLOPIDOGREL BISULFATE 75 MG/1
75 TABLET ORAL DAILY
Qty: 90 TABLET | Refills: 0 | Status: SHIPPED | OUTPATIENT
Start: 2021-02-17 | End: 2021-05-05
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
## 2021-02-19 ENCOUNTER — NURSE ONLY (OUTPATIENT)
Dept: INTERNAL MEDICINE CLINIC | Age: 66
End: 2021-02-19
Payer: MEDICARE

## 2021-02-19 DIAGNOSIS — E78.2 MIXED HYPERLIPIDEMIA: ICD-10-CM

## 2021-02-19 DIAGNOSIS — Z72.0 TOBACCO ABUSE: ICD-10-CM

## 2021-02-19 DIAGNOSIS — Z12.5 SCREENING PSA (PROSTATE SPECIFIC ANTIGEN): ICD-10-CM

## 2021-02-19 DIAGNOSIS — I10 ESSENTIAL HYPERTENSION: ICD-10-CM

## 2021-02-19 LAB
A/G RATIO: 1.5 (ref 1.1–2.2)
ALBUMIN SERPL-MCNC: 3.9 G/DL (ref 3.4–5)
ALP BLD-CCNC: 78 U/L (ref 40–129)
ALT SERPL-CCNC: 10 U/L (ref 10–40)
ANION GAP SERPL CALCULATED.3IONS-SCNC: 10 MMOL/L (ref 3–16)
AST SERPL-CCNC: 15 U/L (ref 15–37)
BASOPHILS ABSOLUTE: 0 K/UL (ref 0–0.2)
BASOPHILS RELATIVE PERCENT: 0.5 %
BILIRUB SERPL-MCNC: 0.5 MG/DL (ref 0–1)
BUN BLDV-MCNC: 10 MG/DL (ref 7–20)
CALCIUM SERPL-MCNC: 9.5 MG/DL (ref 8.3–10.6)
CHLORIDE BLD-SCNC: 107 MMOL/L (ref 99–110)
CHOLESTEROL, FASTING: 209 MG/DL (ref 0–199)
CO2: 26 MMOL/L (ref 21–32)
CREAT SERPL-MCNC: 1 MG/DL (ref 0.8–1.3)
EOSINOPHILS ABSOLUTE: 0.1 K/UL (ref 0–0.6)
EOSINOPHILS RELATIVE PERCENT: 1.7 %
GFR AFRICAN AMERICAN: >60
GFR NON-AFRICAN AMERICAN: >60
GLOBULIN: 2.6 G/DL
GLUCOSE BLD-MCNC: 104 MG/DL (ref 70–99)
HCT VFR BLD CALC: 47.6 % (ref 40.5–52.5)
HDLC SERPL-MCNC: 28 MG/DL (ref 40–60)
HEMOGLOBIN: 15.5 G/DL (ref 13.5–17.5)
LDL CHOLESTEROL CALCULATED: 141 MG/DL
LYMPHOCYTES ABSOLUTE: 2.3 K/UL (ref 1–5.1)
LYMPHOCYTES RELATIVE PERCENT: 28.6 %
MCH RBC QN AUTO: 31 PG (ref 26–34)
MCHC RBC AUTO-ENTMCNC: 32.6 G/DL (ref 31–36)
MCV RBC AUTO: 95.1 FL (ref 80–100)
MONOCYTES ABSOLUTE: 0.6 K/UL (ref 0–1.3)
MONOCYTES RELATIVE PERCENT: 7.6 %
NEUTROPHILS ABSOLUTE: 5 K/UL (ref 1.7–7.7)
NEUTROPHILS RELATIVE PERCENT: 61.6 %
PDW BLD-RTO: 16 % (ref 12.4–15.4)
PLATELET # BLD: 208 K/UL (ref 135–450)
PMV BLD AUTO: 9.7 FL (ref 5–10.5)
POTASSIUM SERPL-SCNC: 4.5 MMOL/L (ref 3.5–5.1)
PROSTATE SPECIFIC ANTIGEN: 12.82 NG/ML (ref 0–4)
RBC # BLD: 5 M/UL (ref 4.2–5.9)
SODIUM BLD-SCNC: 143 MMOL/L (ref 136–145)
TOTAL PROTEIN: 6.5 G/DL (ref 6.4–8.2)
TRIGLYCERIDE, FASTING: 201 MG/DL (ref 0–150)
VLDLC SERPL CALC-MCNC: 40 MG/DL
WBC # BLD: 8.2 K/UL (ref 4–11)

## 2021-02-19 PROCEDURE — 36415 COLL VENOUS BLD VENIPUNCTURE: CPT | Performed by: INTERNAL MEDICINE

## 2021-02-19 NOTE — TELEPHONE ENCOUNTER
Received request for handicap placard. Order printed and will notify patient that it is ready for pickup.

## 2021-03-22 ENCOUNTER — CARE COORDINATION (OUTPATIENT)
Dept: CARE COORDINATION | Age: 66
End: 2021-03-22

## 2021-03-22 NOTE — CARE COORDINATION
Yes Ananya Anderson MD   pravastatin (PRAVACHOL) 80 MG tablet Take 1 tablet by mouth daily 2/15/21  Yes Ananya Anderson MD   ipratropium (ATROVENT) 0.03 % nasal spray USE 2 SPRAYS INTRANASALLY TWICE DAILY 12/21/20  Yes Ananya Anderson MD   budesonide-formoterol [facility]) 160-4.5 MCG/ACT AERO Inhale 2 puffs into the lungs 2 times daily 12/21/20  Yes Ananya Anderson MD   tiotropium (Mortimer Pae) 18 MCG inhalation capsule Inhale 1 capsule into the lungs daily  Patient not taking: Reported on 12/18/2020 10/26/20   Ananya Anderson MD   UNABLE TO FIND Patient taking Native Liposomal CBD spray 300mg per spray (5 mg per serving) for sleep    Historical Provider, MD       Future Appointments   Date Time Provider Antony Li   5/5/2021  1:15 PM Ananya Anderson MD [facility]     ,   COPD Assessment    Does the patient understand envrionmental exposure?: Yes  Is the patient able to verbalize Rescue vs. Long Acting medications?: Yes  Does the patient have a nebulizer?: No  Does the patient use a space with inhaled medications?: No     No patient-reported symptoms         Symptoms:         and   General Assessment    Do you have any symptoms that are causing concern?: No 31-Mar-2020 02:11

## 2021-05-04 DIAGNOSIS — I73.9 PAD (PERIPHERAL ARTERY DISEASE) (HCC): ICD-10-CM

## 2021-05-05 ENCOUNTER — OFFICE VISIT (OUTPATIENT)
Dept: INTERNAL MEDICINE CLINIC | Age: 66
End: 2021-05-05
Payer: MEDICARE

## 2021-05-05 VITALS
WEIGHT: 211.6 LBS | RESPIRATION RATE: 16 BRPM | DIASTOLIC BLOOD PRESSURE: 80 MMHG | OXYGEN SATURATION: 97 % | SYSTOLIC BLOOD PRESSURE: 138 MMHG | HEART RATE: 76 BPM | TEMPERATURE: 98.9 F | BODY MASS INDEX: 30.8 KG/M2

## 2021-05-05 DIAGNOSIS — G25.81 RLS (RESTLESS LEGS SYNDROME): ICD-10-CM

## 2021-05-05 DIAGNOSIS — Z72.0 TOBACCO ABUSE: ICD-10-CM

## 2021-05-05 DIAGNOSIS — I73.9 PAD (PERIPHERAL ARTERY DISEASE) (HCC): ICD-10-CM

## 2021-05-05 DIAGNOSIS — F32.A ANXIETY AND DEPRESSION: ICD-10-CM

## 2021-05-05 DIAGNOSIS — F07.81 POST CONCUSSION SYNDROME: ICD-10-CM

## 2021-05-05 DIAGNOSIS — I10 ESSENTIAL HYPERTENSION: ICD-10-CM

## 2021-05-05 DIAGNOSIS — R97.20 ELEVATED PSA: ICD-10-CM

## 2021-05-05 DIAGNOSIS — J44.9 CHRONIC OBSTRUCTIVE PULMONARY DISEASE, UNSPECIFIED COPD TYPE (HCC): ICD-10-CM

## 2021-05-05 DIAGNOSIS — E78.2 MIXED HYPERLIPIDEMIA: ICD-10-CM

## 2021-05-05 DIAGNOSIS — F41.9 ANXIETY AND DEPRESSION: ICD-10-CM

## 2021-05-05 PROCEDURE — 99214 OFFICE O/P EST MOD 30 MIN: CPT | Performed by: INTERNAL MEDICINE

## 2021-05-05 RX ORDER — CLOPIDOGREL BISULFATE 75 MG/1
75 TABLET ORAL DAILY
Qty: 30 TABLET | Refills: 0 | Status: SHIPPED | OUTPATIENT
Start: 2021-05-05 | End: 2021-06-03

## 2021-05-05 RX ORDER — BUDESONIDE AND FORMOTEROL FUMARATE DIHYDRATE 160; 4.5 UG/1; UG/1
2 AEROSOL RESPIRATORY (INHALATION) 2 TIMES DAILY
Qty: 30.6 G | Refills: 3 | Status: SHIPPED | OUTPATIENT
Start: 2021-05-05 | End: 2021-12-06

## 2021-05-05 ASSESSMENT — PATIENT HEALTH QUESTIONNAIRE - PHQ9: SUM OF ALL RESPONSES TO PHQ9 QUESTIONS 1 & 2: 0
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
## 2021-05-05 NOTE — ASSESSMENT & PLAN NOTE
PSA 12.0  Referred to urologist but did not keep appt. Afraid of finding of cancer and refused to see him.    He understands consequences

## 2021-05-05 NOTE — ASSESSMENT & PLAN NOTE
Dx with COPD. Chronic smoker. Patient is on Symbicort 160/4.5. and albuterol inhaler prn  Needs PFT ; pt  Refused it .    Pt stopped taking Spiriva

## 2021-05-05 NOTE — ASSESSMENT & PLAN NOTE
Patient is taking buspar and wellbutrin and that helps. No suicidal ideation. .  Patient stated anxiety and depression have resolved and he is not taking any medications  Pt states after divorce his anxiety and depression resolved.

## 2021-06-03 DIAGNOSIS — G89.29 CHEST WALL PAIN, CHRONIC: ICD-10-CM

## 2021-06-03 DIAGNOSIS — I73.9 PAD (PERIPHERAL ARTERY DISEASE) (HCC): ICD-10-CM

## 2021-06-03 DIAGNOSIS — R07.89 CHEST WALL PAIN, CHRONIC: ICD-10-CM

## 2021-06-03 RX ORDER — GABAPENTIN 300 MG/1
300 CAPSULE ORAL NIGHTLY
Qty: 30 CAPSULE | Refills: 3 | Status: SHIPPED | OUTPATIENT
Start: 2021-06-03 | End: 2021-10-08

## 2021-06-03 RX ORDER — CLOPIDOGREL BISULFATE 75 MG/1
75 TABLET ORAL DAILY
Qty: 30 TABLET | Refills: 2 | Status: SHIPPED | OUTPATIENT
Start: 2021-06-03 | End: 2021-09-13
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
## 2021-07-05 DIAGNOSIS — I73.9 PAD (PERIPHERAL ARTERY DISEASE) (HCC): ICD-10-CM

## 2021-07-06 RX ORDER — PRAVASTATIN SODIUM 80 MG/1
80 TABLET ORAL DAILY
Qty: 30 TABLET | Refills: 1 | Status: SHIPPED | OUTPATIENT
Start: 2021-07-06 | End: 2021-09-13

## 2021-08-18 ENCOUNTER — VIRTUAL VISIT (OUTPATIENT)
Dept: INTERNAL MEDICINE CLINIC | Age: 66
End: 2021-08-18

## 2021-08-19 ENCOUNTER — TELEPHONE (OUTPATIENT)
Dept: INTERNAL MEDICINE CLINIC | Age: 66
End: 2021-08-19

## 2021-09-10 DIAGNOSIS — I73.9 PAD (PERIPHERAL ARTERY DISEASE) (HCC): ICD-10-CM

## 2021-09-13 DIAGNOSIS — I73.9 PAD (PERIPHERAL ARTERY DISEASE) (HCC): ICD-10-CM

## 2021-09-13 RX ORDER — CLOPIDOGREL BISULFATE 75 MG/1
75 TABLET ORAL DAILY
Qty: 10 TABLET | Refills: 0 | Status: SHIPPED | OUTPATIENT
Start: 2021-09-13 | End: 2021-10-08

## 2021-09-13 RX ORDER — PRAVASTATIN SODIUM 80 MG/1
80 TABLET ORAL DAILY
Qty: 10 TABLET | Refills: 0 | Status: SHIPPED | OUTPATIENT
Start: 2021-09-13 | End: 2021-10-08

## 2021-10-08 DIAGNOSIS — G89.29 CHEST WALL PAIN, CHRONIC: ICD-10-CM

## 2021-10-08 DIAGNOSIS — R07.89 CHEST WALL PAIN, CHRONIC: ICD-10-CM

## 2021-10-08 DIAGNOSIS — I73.9 PAD (PERIPHERAL ARTERY DISEASE) (HCC): ICD-10-CM

## 2021-10-08 RX ORDER — PRAVASTATIN SODIUM 80 MG/1
TABLET ORAL
Qty: 10 TABLET | Refills: 0 | Status: SHIPPED | OUTPATIENT
Start: 2021-10-08 | End: 2021-10-13

## 2021-10-08 RX ORDER — CLOPIDOGREL BISULFATE 75 MG/1
75 TABLET ORAL DAILY
Qty: 10 TABLET | Refills: 0 | Status: SHIPPED | OUTPATIENT
Start: 2021-10-08 | End: 2021-10-13 | Stop reason: SDUPTHER

## 2021-10-11 RX ORDER — GABAPENTIN 300 MG/1
CAPSULE ORAL
Qty: 5 CAPSULE | Refills: 0 | Status: SHIPPED | OUTPATIENT
Start: 2021-10-11 | End: 2021-10-13

## 2021-10-13 ENCOUNTER — OFFICE VISIT (OUTPATIENT)
Dept: INTERNAL MEDICINE CLINIC | Age: 66
End: 2021-10-13
Payer: MEDICARE

## 2021-10-13 VITALS
TEMPERATURE: 99 F | SYSTOLIC BLOOD PRESSURE: 140 MMHG | HEART RATE: 84 BPM | WEIGHT: 212.8 LBS | HEIGHT: 70 IN | BODY MASS INDEX: 30.46 KG/M2 | RESPIRATION RATE: 16 BRPM | DIASTOLIC BLOOD PRESSURE: 96 MMHG | OXYGEN SATURATION: 98 %

## 2021-10-13 DIAGNOSIS — G25.81 RLS (RESTLESS LEGS SYNDROME): ICD-10-CM

## 2021-10-13 DIAGNOSIS — F41.9 ANXIETY AND DEPRESSION: ICD-10-CM

## 2021-10-13 DIAGNOSIS — Z72.0 TOBACCO ABUSE: ICD-10-CM

## 2021-10-13 DIAGNOSIS — F07.81 POST CONCUSSION SYNDROME: ICD-10-CM

## 2021-10-13 DIAGNOSIS — R97.20 ELEVATED PSA: ICD-10-CM

## 2021-10-13 DIAGNOSIS — I10 PRIMARY HYPERTENSION: ICD-10-CM

## 2021-10-13 DIAGNOSIS — I73.9 PAD (PERIPHERAL ARTERY DISEASE) (HCC): ICD-10-CM

## 2021-10-13 DIAGNOSIS — R60.0 LEG EDEMA, LEFT: ICD-10-CM

## 2021-10-13 DIAGNOSIS — J44.9 CHRONIC OBSTRUCTIVE PULMONARY DISEASE, UNSPECIFIED COPD TYPE (HCC): Primary | ICD-10-CM

## 2021-10-13 DIAGNOSIS — F32.A ANXIETY AND DEPRESSION: ICD-10-CM

## 2021-10-13 DIAGNOSIS — F43.10 PTSD (POST-TRAUMATIC STRESS DISORDER): ICD-10-CM

## 2021-10-13 DIAGNOSIS — E78.2 MIXED HYPERLIPIDEMIA: ICD-10-CM

## 2021-10-13 PROCEDURE — 99214 OFFICE O/P EST MOD 30 MIN: CPT | Performed by: INTERNAL MEDICINE

## 2021-10-13 RX ORDER — CLOPIDOGREL BISULFATE 75 MG/1
75 TABLET ORAL DAILY
Qty: 30 TABLET | Refills: 5 | Status: SHIPPED | OUTPATIENT
Start: 2021-10-13 | End: 2022-04-06

## 2021-10-13 RX ORDER — ROSUVASTATIN CALCIUM 5 MG/1
5 TABLET, COATED ORAL
Qty: 30 TABLET | Refills: 1 | Status: SHIPPED | OUTPATIENT
Start: 2021-10-14 | End: 2022-02-22 | Stop reason: SDUPTHER

## 2021-10-13 ASSESSMENT — PATIENT HEALTH QUESTIONNAIRE - PHQ9
SUM OF ALL RESPONSES TO PHQ QUESTIONS 1-9: 0
SUM OF ALL RESPONSES TO PHQ9 QUESTIONS 1 & 2: 0
1. LITTLE INTEREST OR PLEASURE IN DOING THINGS: 0
SUM OF ALL RESPONSES TO PHQ QUESTIONS 1-9: 0
2. FEELING DOWN, DEPRESSED OR HOPELESS: 0
SUM OF ALL RESPONSES TO PHQ QUESTIONS 1-9: 0
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
## 2021-10-13 NOTE — ASSESSMENT & PLAN NOTE
PSA 12.0  Referred to urologist but did not keep appt. Afraid of finding of cancer and refused to see him.    He understands consequences   Refer again

## 2021-10-13 NOTE — ASSESSMENT & PLAN NOTE
Patient is on pravastatin 80 mg daily. Patient had stopped taking the medication noncompliant  Pt states he had aches and pain in the legs.   Will start on crestor 5 mg once a week and then increase twice a week

## 2021-10-13 NOTE — ASSESSMENT & PLAN NOTE
Left leg feels numb and throbs at night  Takes gabapentin 300 mg at hs and that helps.   Pt states he stopped taking gabapentin

## 2021-10-13 NOTE — ASSESSMENT & PLAN NOTE
Pt had left iliac PTCA and stent left iliac artery and fem - tibial bypass which failed and later repaired. Has chronic edema since then.   Pt is taking plavix

## 2021-10-13 NOTE — ASSESSMENT & PLAN NOTE
Dx with COPD. Chronic smoker. Patient is on Symbicort 160/4.5. and albuterol inhaler prn  Needs PFT ; pt  Refused it .

## 2021-10-26 ENCOUNTER — TELEPHONE (OUTPATIENT)
Dept: INTERNAL MEDICINE CLINIC | Age: 66
End: 2021-10-26

## 2021-10-26 NOTE — TELEPHONE ENCOUNTER
Faxed referral to Dr. Emily Vitale office, they will call the patient with an appointment.  Patient informed    Confirmation of fax received

## 2021-11-09 ENCOUNTER — TELEPHONE (OUTPATIENT)
Dept: INTERNAL MEDICINE CLINIC | Age: 66
End: 2021-11-09

## 2021-11-09 NOTE — TELEPHONE ENCOUNTER
Tried to reach patient x 2 and no answer. Left message on voicemail to call off. Rx request to refill gabapentin is in question. Dr. Marianela Zamora stopped gabapentin.

## 2021-12-03 DIAGNOSIS — J44.9 CHRONIC OBSTRUCTIVE PULMONARY DISEASE, UNSPECIFIED COPD TYPE (HCC): ICD-10-CM

## 2021-12-06 RX ORDER — BUDESONIDE AND FORMOTEROL FUMARATE DIHYDRATE 160; 4.5 UG/1; UG/1
AEROSOL RESPIRATORY (INHALATION)
Qty: 10.2 G | Refills: 3 | Status: SHIPPED | OUTPATIENT
Start: 2021-12-06 | End: 2022-04-07

## 2021-12-08 ENCOUNTER — TELEPHONE (OUTPATIENT)
Dept: INTERNAL MEDICINE CLINIC | Age: 66
End: 2021-12-08

## 2021-12-09 ENCOUNTER — TELEPHONE (OUTPATIENT)
Dept: INTERNAL MEDICINE CLINIC | Age: 66
End: 2021-12-09

## 2021-12-31 DIAGNOSIS — R09.81 SINUS CONGESTION: ICD-10-CM

## 2021-12-31 DIAGNOSIS — R07.89 CHEST WALL PAIN, CHRONIC: ICD-10-CM

## 2021-12-31 DIAGNOSIS — G89.29 CHEST WALL PAIN, CHRONIC: ICD-10-CM

## 2022-01-04 RX ORDER — GABAPENTIN 300 MG/1
CAPSULE ORAL
Qty: 5 CAPSULE | Refills: 0 | OUTPATIENT
Start: 2022-01-04

## 2022-01-04 RX ORDER — IPRATROPIUM BROMIDE 21 UG/1
SPRAY, METERED NASAL
Qty: 30 ML | Refills: 5 | Status: SHIPPED | OUTPATIENT
Start: 2022-01-04 | End: 2022-06-30

## 2022-02-09 ENCOUNTER — TELEPHONE (OUTPATIENT)
Dept: INTERNAL MEDICINE CLINIC | Age: 67
End: 2022-02-09

## 2022-02-09 NOTE — TELEPHONE ENCOUNTER
Lm on voicemail to call office. Needs a follow up appt and need to know if he is taking gabapentin, how much and how often.

## 2022-02-22 ENCOUNTER — OFFICE VISIT (OUTPATIENT)
Dept: INTERNAL MEDICINE CLINIC | Age: 67
End: 2022-02-22
Payer: MEDICARE

## 2022-02-22 VITALS
BODY MASS INDEX: 31.37 KG/M2 | TEMPERATURE: 97.2 F | OXYGEN SATURATION: 96 % | WEIGHT: 218.6 LBS | RESPIRATION RATE: 16 BRPM | HEART RATE: 80 BPM | DIASTOLIC BLOOD PRESSURE: 78 MMHG | SYSTOLIC BLOOD PRESSURE: 118 MMHG

## 2022-02-22 DIAGNOSIS — F43.10 PTSD (POST-TRAUMATIC STRESS DISORDER): ICD-10-CM

## 2022-02-22 DIAGNOSIS — Z12.5 SCREENING PSA (PROSTATE SPECIFIC ANTIGEN): ICD-10-CM

## 2022-02-22 DIAGNOSIS — Z72.0 TOBACCO ABUSE: ICD-10-CM

## 2022-02-22 DIAGNOSIS — I73.9 PAD (PERIPHERAL ARTERY DISEASE) (HCC): ICD-10-CM

## 2022-02-22 DIAGNOSIS — E78.2 MIXED HYPERLIPIDEMIA: ICD-10-CM

## 2022-02-22 DIAGNOSIS — J44.9 CHRONIC OBSTRUCTIVE PULMONARY DISEASE, UNSPECIFIED COPD TYPE (HCC): Primary | ICD-10-CM

## 2022-02-22 DIAGNOSIS — R97.20 ELEVATED PSA: ICD-10-CM

## 2022-02-22 DIAGNOSIS — R60.0 LEG EDEMA, LEFT: ICD-10-CM

## 2022-02-22 PROBLEM — F41.9 ANXIETY AND DEPRESSION: Status: RESOLVED | Noted: 2020-11-03 | Resolved: 2022-02-22

## 2022-02-22 PROBLEM — G25.81 RLS (RESTLESS LEGS SYNDROME): Status: RESOLVED | Noted: 2020-11-03 | Resolved: 2022-02-22

## 2022-02-22 PROBLEM — F32.A ANXIETY AND DEPRESSION: Status: RESOLVED | Noted: 2020-11-03 | Resolved: 2022-02-22

## 2022-02-22 LAB
A/G RATIO: 1.6 (ref 1.1–2.2)
ALBUMIN SERPL-MCNC: 4.2 G/DL (ref 3.4–5)
ALP BLD-CCNC: 85 U/L (ref 40–129)
ALT SERPL-CCNC: 22 U/L (ref 10–40)
ANION GAP SERPL CALCULATED.3IONS-SCNC: 15 MMOL/L (ref 3–16)
AST SERPL-CCNC: 20 U/L (ref 15–37)
BILIRUB SERPL-MCNC: 0.6 MG/DL (ref 0–1)
BUN BLDV-MCNC: 12 MG/DL (ref 7–20)
CALCIUM SERPL-MCNC: 9.9 MG/DL (ref 8.3–10.6)
CHLORIDE BLD-SCNC: 106 MMOL/L (ref 99–110)
CHOLESTEROL, FASTING: 238 MG/DL (ref 0–199)
CO2: 21 MMOL/L (ref 21–32)
CREAT SERPL-MCNC: 1 MG/DL (ref 0.8–1.3)
GFR AFRICAN AMERICAN: >60
GFR NON-AFRICAN AMERICAN: >60
GLUCOSE BLD-MCNC: 95 MG/DL (ref 70–99)
HDLC SERPL-MCNC: 40 MG/DL (ref 40–60)
LDL CHOLESTEROL CALCULATED: 174 MG/DL
POTASSIUM SERPL-SCNC: 4.5 MMOL/L (ref 3.5–5.1)
PROSTATE SPECIFIC ANTIGEN: 12.43 NG/ML (ref 0–4)
SODIUM BLD-SCNC: 142 MMOL/L (ref 136–145)
TOTAL PROTEIN: 6.9 G/DL (ref 6.4–8.2)
TRIGLYCERIDE, FASTING: 119 MG/DL (ref 0–150)
VLDLC SERPL CALC-MCNC: 24 MG/DL

## 2022-02-22 PROCEDURE — 99214 OFFICE O/P EST MOD 30 MIN: CPT | Performed by: INTERNAL MEDICINE

## 2022-02-22 PROCEDURE — 36415 COLL VENOUS BLD VENIPUNCTURE: CPT | Performed by: INTERNAL MEDICINE

## 2022-02-22 RX ORDER — ROSUVASTATIN CALCIUM 5 MG/1
5 TABLET, COATED ORAL
Qty: 30 TABLET | Refills: 3 | Status: SHIPPED | OUTPATIENT
Start: 2022-02-24 | End: 2022-06-20 | Stop reason: SDUPTHER

## 2022-02-22 ASSESSMENT — PATIENT HEALTH QUESTIONNAIRE - PHQ9
SUM OF ALL RESPONSES TO PHQ QUESTIONS 1-9: 0
2. FEELING DOWN, DEPRESSED OR HOPELESS: 0
SUM OF ALL RESPONSES TO PHQ9 QUESTIONS 1 & 2: 0
SUM OF ALL RESPONSES TO PHQ QUESTIONS 1-9: 0
1. LITTLE INTEREST OR PLEASURE IN DOING THINGS: 0

## 2022-02-22 ASSESSMENT — ENCOUNTER SYMPTOMS
COUGH: 0
WHEEZING: 0
ALLERGIC/IMMUNOLOGIC NEGATIVE: 1
GASTROINTESTINAL NEGATIVE: 1
SHORTNESS OF BREATH: 0
EYES NEGATIVE: 1
RESPIRATORY NEGATIVE: 1
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
## 2022-02-22 NOTE — ASSESSMENT & PLAN NOTE
PSA 12.0  Referred to urologist but did not keep appt. Afraid of finding of cancer and refused to see him. He understands consequences.   Will refer him again

## 2022-02-22 NOTE — ASSESSMENT & PLAN NOTE
Pt had left iliac PTCA and stent left iliac artery and fem - tibial bypass which failed and later repaired. Has chronic edema since then. .  Patient has old healed scars on the left leg medially  Pt is taking plavix

## 2022-02-22 NOTE — ASSESSMENT & PLAN NOTE
.  Patient is taking Crestor 5 mg twice a week and is tolerating it well.   Will check lipid profile

## 2022-03-01 DIAGNOSIS — R07.89 CHEST WALL PAIN, CHRONIC: ICD-10-CM

## 2022-03-01 DIAGNOSIS — G89.29 CHEST WALL PAIN, CHRONIC: ICD-10-CM

## 2022-03-01 RX ORDER — GABAPENTIN 300 MG/1
CAPSULE ORAL
Qty: 5 CAPSULE | Refills: 0 | OUTPATIENT
Start: 2022-03-01

## 2022-04-06 DIAGNOSIS — R07.89 CHEST WALL PAIN, CHRONIC: ICD-10-CM

## 2022-04-06 DIAGNOSIS — G89.29 CHEST WALL PAIN, CHRONIC: ICD-10-CM

## 2022-04-06 DIAGNOSIS — I73.9 PAD (PERIPHERAL ARTERY DISEASE) (HCC): ICD-10-CM

## 2022-04-07 DIAGNOSIS — J44.9 CHRONIC OBSTRUCTIVE PULMONARY DISEASE, UNSPECIFIED COPD TYPE (HCC): ICD-10-CM

## 2022-04-07 RX ORDER — CLOPIDOGREL BISULFATE 75 MG/1
75 TABLET ORAL DAILY
Qty: 30 TABLET | Refills: 5 | Status: SHIPPED | OUTPATIENT
Start: 2022-04-07 | End: 2022-09-23

## 2022-04-07 RX ORDER — BUDESONIDE AND FORMOTEROL FUMARATE DIHYDRATE 160; 4.5 UG/1; UG/1
AEROSOL RESPIRATORY (INHALATION)
Qty: 10.2 G | Refills: 3 | Status: SHIPPED | OUTPATIENT
Start: 2022-04-07 | End: 2022-06-20 | Stop reason: SDUPTHER

## 2022-04-07 RX ORDER — GABAPENTIN 300 MG/1
CAPSULE ORAL
Qty: 5 CAPSULE | Refills: 10 | OUTPATIENT
Start: 2022-04-07

## 2022-05-05 DIAGNOSIS — R07.89 CHEST WALL PAIN, CHRONIC: ICD-10-CM

## 2022-05-05 DIAGNOSIS — G89.29 CHEST WALL PAIN, CHRONIC: ICD-10-CM

## 2022-05-06 RX ORDER — GABAPENTIN 300 MG/1
CAPSULE ORAL
Qty: 5 CAPSULE | Refills: 10 | OUTPATIENT
Start: 2022-05-06

## 2022-05-27 ENCOUNTER — OFFICE VISIT (OUTPATIENT)
Dept: INTERNAL MEDICINE CLINIC | Age: 67
End: 2022-05-27
Payer: MEDICARE

## 2022-05-27 VITALS
BODY MASS INDEX: 32 KG/M2 | WEIGHT: 223 LBS | OXYGEN SATURATION: 97 % | SYSTOLIC BLOOD PRESSURE: 162 MMHG | HEART RATE: 68 BPM | RESPIRATION RATE: 15 BRPM | DIASTOLIC BLOOD PRESSURE: 110 MMHG | TEMPERATURE: 97 F

## 2022-05-27 DIAGNOSIS — R60.0 LEG EDEMA, LEFT: ICD-10-CM

## 2022-05-27 DIAGNOSIS — E78.2 MIXED HYPERLIPIDEMIA: ICD-10-CM

## 2022-05-27 DIAGNOSIS — I73.9 PAD (PERIPHERAL ARTERY DISEASE) (HCC): ICD-10-CM

## 2022-05-27 DIAGNOSIS — R97.20 ELEVATED PSA: ICD-10-CM

## 2022-05-27 DIAGNOSIS — I10 PRIMARY HYPERTENSION: ICD-10-CM

## 2022-05-27 DIAGNOSIS — Z72.0 TOBACCO ABUSE: ICD-10-CM

## 2022-05-27 DIAGNOSIS — J44.9 CHRONIC OBSTRUCTIVE PULMONARY DISEASE, UNSPECIFIED COPD TYPE (HCC): Primary | ICD-10-CM

## 2022-05-27 PROCEDURE — 99214 OFFICE O/P EST MOD 30 MIN: CPT | Performed by: INTERNAL MEDICINE

## 2022-05-27 RX ORDER — DOXAZOSIN MESYLATE 1 MG/1
1 TABLET ORAL DAILY
Qty: 30 TABLET | Refills: 3 | Status: SHIPPED | OUTPATIENT
Start: 2022-05-27 | End: 2022-06-20 | Stop reason: SDUPTHER

## 2022-05-27 ASSESSMENT — PATIENT HEALTH QUESTIONNAIRE - PHQ9
SUM OF ALL RESPONSES TO PHQ QUESTIONS 1-9: 0
SUM OF ALL RESPONSES TO PHQ QUESTIONS 1-9: 0
SUM OF ALL RESPONSES TO PHQ9 QUESTIONS 1 & 2: 0
1. LITTLE INTEREST OR PLEASURE IN DOING THINGS: 0
SUM OF ALL RESPONSES TO PHQ QUESTIONS 1-9: 0
SUM OF ALL RESPONSES TO PHQ QUESTIONS 1-9: 0
2. FEELING DOWN, DEPRESSED OR HOPELESS: 0
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
## 2022-06-20 DIAGNOSIS — J44.9 CHRONIC OBSTRUCTIVE PULMONARY DISEASE, UNSPECIFIED COPD TYPE (HCC): ICD-10-CM

## 2022-06-20 RX ORDER — DOXAZOSIN MESYLATE 1 MG/1
1 TABLET ORAL DAILY
Qty: 30 TABLET | Refills: 3 | Status: SHIPPED | OUTPATIENT
Start: 2022-06-20 | End: 2022-08-29 | Stop reason: SDUPTHER

## 2022-06-20 RX ORDER — ROSUVASTATIN CALCIUM 5 MG/1
5 TABLET, COATED ORAL
Qty: 30 TABLET | Refills: 3 | Status: SHIPPED | OUTPATIENT
Start: 2022-06-20 | End: 2022-08-29 | Stop reason: SDUPTHER

## 2022-06-20 RX ORDER — BUDESONIDE AND FORMOTEROL FUMARATE DIHYDRATE 160; 4.5 UG/1; UG/1
AEROSOL RESPIRATORY (INHALATION)
Qty: 10.2 G | Refills: 3 | Status: SHIPPED | OUTPATIENT
Start: 2022-06-20 | End: 2022-07-20 | Stop reason: SDUPTHER

## 2022-06-22 ENCOUNTER — TELEPHONE (OUTPATIENT)
Dept: INTERNAL MEDICINE CLINIC | Age: 67
End: 2022-06-22

## 2022-06-22 NOTE — TELEPHONE ENCOUNTER
----- Message from Andrea Rosaleso sent at 6/22/2022  4:07 PM EDT -----  Subject: Message to Provider    QUESTIONS  Information for Provider? Pt has a question regarding his SYMBICORT   160-4.5 MCG/ACT AERO medication. Pt requesting a call back. ---------------------------------------------------------------------------  --------------  Tony OLEA  What is the best way for the office to contact you? OK to leave message on   voicemail  Preferred Call Back Phone Number? [phone]  ---------------------------------------------------------------------------  --------------  SCRIPT ANSWERS  Relationship to Patient?  Self

## 2022-06-30 DIAGNOSIS — R09.81 SINUS CONGESTION: ICD-10-CM

## 2022-06-30 RX ORDER — IPRATROPIUM BROMIDE 21 UG/1
SPRAY, METERED NASAL
Qty: 30 ML | Refills: 10 | Status: SHIPPED | OUTPATIENT
Start: 2022-06-30 | End: 2022-08-29 | Stop reason: SDUPTHER

## 2022-06-30 NOTE — TELEPHONE ENCOUNTER
Medication:   Requested Prescriptions     Pending Prescriptions Disp Refills    ipratropium (ATROVENT) 0.03 % nasal spray [Pharmacy Med Name: IPRATROPIUM 0.03% SPRY 30ML 0.03 Solution] 30 mL 10     Sig: USE 2 SPRAYS IN EACH NOSTRIL TWICE DAILY       Last Filled:      Patient Phone Number: [phone] (home) [phone] (work)    Last appt: 5/27/2022   Next appt: 8/29/2022    Last Labs DM:   Lab Results   Component Value Date/Time    LABA1C 5.3 09/19/2017 08:20 AM     Last Lipid:   Lab Results   Component Value Date/Time    CHOL 153 03/10/2020 01:33 PM    TRIG 161 03/10/2020 01:33 PM    HDL 40 02/22/2022 09:40 AM    LDLCALC 174 02/22/2022 09:40 AM     Last PSA:   Lab Results   Component Value Date/Time    PSA 12.43 02/22/2022 09:40 AM     Last Thyroid: No results found for: TSH, FT3, T7BRRSQ, [place], S4BJNML

## 2022-07-19 DIAGNOSIS — J44.9 CHRONIC OBSTRUCTIVE PULMONARY DISEASE, UNSPECIFIED COPD TYPE (HCC): ICD-10-CM

## 2022-07-19 NOTE — TELEPHONE ENCOUNTER
Patient is reporting that his inhalers are only lasting about 15 days. Patient states he is using them twice per day. He would like to know if a refill can be sent to [facility].

## 2022-07-20 RX ORDER — BUDESONIDE AND FORMOTEROL FUMARATE DIHYDRATE 160; 4.5 UG/1; UG/1
AEROSOL RESPIRATORY (INHALATION)
Qty: 10.2 G | Refills: 3 | Status: SHIPPED | OUTPATIENT
Start: 2022-07-20 | End: 2022-08-29 | Stop reason: SDUPTHER

## 2022-07-22 ENCOUNTER — TELEPHONE (OUTPATIENT)
Dept: INTERNAL MEDICINE CLINIC | Age: 67
End: 2022-07-22

## 2022-08-26 ENCOUNTER — TELEPHONE (OUTPATIENT)
Dept: INTERNAL MEDICINE CLINIC | Age: 67
End: 2022-08-26

## 2022-08-26 ENCOUNTER — OFFICE VISIT (OUTPATIENT)
Dept: INTERNAL MEDICINE CLINIC | Age: 67
End: 2022-08-26
Payer: MEDICARE

## 2022-08-26 VITALS
SYSTOLIC BLOOD PRESSURE: 166 MMHG | WEIGHT: 224 LBS | DIASTOLIC BLOOD PRESSURE: 92 MMHG | HEIGHT: 71 IN | HEART RATE: 73 BPM | OXYGEN SATURATION: 96 % | BODY MASS INDEX: 31.36 KG/M2

## 2022-08-26 DIAGNOSIS — Z00.00 INITIAL MEDICARE ANNUAL WELLNESS VISIT: Primary | ICD-10-CM

## 2022-08-26 PROCEDURE — G0438 PPPS, INITIAL VISIT: HCPCS | Performed by: INTERNAL MEDICINE

## 2022-08-26 SDOH — ECONOMIC STABILITY: FOOD INSECURITY: WITHIN THE PAST 12 MONTHS, YOU WORRIED THAT YOUR FOOD WOULD RUN OUT BEFORE YOU GOT MONEY TO BUY MORE.: NEVER TRUE

## 2022-08-26 SDOH — ECONOMIC STABILITY: FOOD INSECURITY: WITHIN THE PAST 12 MONTHS, THE FOOD YOU BOUGHT JUST DIDN'T LAST AND YOU DIDN'T HAVE MONEY TO GET MORE.: NEVER TRUE

## 2022-08-26 ASSESSMENT — LIFESTYLE VARIABLES
HOW OFTEN DO YOU HAVE A DRINK CONTAINING ALCOHOL: MONTHLY OR LESS
HOW MANY STANDARD DRINKS CONTAINING ALCOHOL DO YOU HAVE ON A TYPICAL DAY: 1 OR 2

## 2022-08-26 ASSESSMENT — PATIENT HEALTH QUESTIONNAIRE - PHQ9
2. FEELING DOWN, DEPRESSED OR HOPELESS: 0
9. THOUGHTS THAT YOU WOULD BE BETTER OFF DEAD, OR OF HURTING YOURSELF: 0
4. FEELING TIRED OR HAVING LITTLE ENERGY: 1
1. LITTLE INTEREST OR PLEASURE IN DOING THINGS: 0
7. TROUBLE CONCENTRATING ON THINGS, SUCH AS READING THE NEWSPAPER OR WATCHING TELEVISION: 0
5. POOR APPETITE OR OVEREATING: 0
SUM OF ALL RESPONSES TO PHQ QUESTIONS 1-9: 2
SUM OF ALL RESPONSES TO PHQ9 QUESTIONS 1 & 2: 0
SUM OF ALL RESPONSES TO PHQ QUESTIONS 1-9: 2
3. TROUBLE FALLING OR STAYING ASLEEP: 0
SUM OF ALL RESPONSES TO PHQ QUESTIONS 1-9: 2
SUM OF ALL RESPONSES TO PHQ QUESTIONS 1-9: 2
6. FEELING BAD ABOUT YOURSELF - OR THAT YOU ARE A FAILURE OR HAVE LET YOURSELF OR YOUR FAMILY DOWN: 1
10. IF YOU CHECKED OFF ANY PROBLEMS, HOW DIFFICULT HAVE THESE PROBLEMS MADE IT FOR YOU TO DO YOUR WORK, TAKE CARE OF THINGS AT HOME, OR GET ALONG WITH OTHER PEOPLE: 0
8. MOVING OR SPEAKING SO SLOWLY THAT OTHER PEOPLE COULD HAVE NOTICED. OR THE OPPOSITE, BEING SO FIGETY OR RESTLESS THAT YOU HAVE BEEN MOVING AROUND A LOT MORE THAN USUAL: 0

## 2022-08-26 ASSESSMENT — SOCIAL DETERMINANTS OF HEALTH (SDOH): HOW HARD IS IT FOR YOU TO PAY FOR THE VERY BASICS LIKE FOOD, HOUSING, MEDICAL CARE, AND HEATING?: NOT HARD AT ALL

## 2022-08-26 NOTE — PATIENT INSTRUCTIONS
Personalized Preventive Plan for [place] - 8/26/2022  Medicare offers a range of preventive health benefits. Some of the tests and screenings are paid in full while other may be subject to a deductible, co-insurance, and/or copay. Some of these benefits include a comprehensive review of your medical history including lifestyle, illnesses that may run in your family, and various assessments and screenings as appropriate. After reviewing your medical record and screening and assessments performed today your provider may have ordered immunizations, labs, imaging, and/or referrals for you. A list of these orders (if applicable) as well as your Preventive Care list are included within your After Visit Summary for your review. Other Preventive Recommendations:    A preventive eye exam performed by an eye specialist is recommended every 1-2 years to screen for glaucoma; cataracts, macular degeneration, and other eye disorders. A preventive dental visit is recommended every 6 months. Try to get at least 150 minutes of exercise per week or 10,000 steps per day on a pedometer . Order or download the FREE \"Exercise & Physical Activity: Your Everyday Guide\" from [employer]. Call 0-694.923.9962 or search [employer] online. You need 6504-5385 mg of calcium and 0296-2255 IU of vitamin D per day. It is possible to meet your calcium requirement with diet alone, but a vitamin D supplement is usually necessary to meet this goal.  When exposed to the sun, use a sunscreen that protects against both UVA and UVB radiation with an SPF of 30 or greater. Reapply every 2 to 3 hours or after sweating, drying off with a towel, or swimming. Always wear a seat belt when traveling in a car. Always wear a helmet when riding a bicycle or motorcycle. Heart-Healthy Diet   Sodium, Fat, and Cholesterol Controlled Diet       What Is a Heart Healthy Diet?    A heart-healthy diet is one that limits sodium , certain types of fat , and cholesterol . This type of diet is recommended for:   People with any form of cardiovascular disease (eg, coronary heart disease , peripheral vascular disease , previous heart attack , previous stroke )   People with risk factors for cardiovascular disease, such as high blood pressure , high cholesterol , or diabetes   Anyone who wants to lower their risk of developing cardiovascular disease   Sodium    Sodium is a mineral found in many foods. In general, most people consume much more sodium than they need. Diets high in sodium can increase blood pressure and lead to edema (water retention). On a heart-healthy diet, you should consume no more than 2,300 mg (milligrams) of sodium per dayabout the amount in one teaspoon of table salt. The foods highest in sodium include table salt (about 50% sodium), processed foods, convenience foods, and preserved foods. Cholesterol    Cholesterol is a fat-like, waxy substance in your blood. Our bodies make some cholesterol. It is also found in animal products, with the highest amounts in fatty meat, egg yolks, whole milk, cheese, shellfish, and organ meats. On a heart-healthy diet, you should limit your cholesterol intake to less than 200 mg per day. It is normal and important to have some cholesterol in your bloodstream. But too much cholesterol can cause plaque to build up within your arteries, which can eventually lead to a heart attack or stroke. The two types of cholesterol that are most commonly referred to are:   Low-density lipoprotein (LDL) cholesterol  Also known as bad cholesterol, this is the cholesterol that tends to build up along your arteries. Bad cholesterol levels are increased by eating fats that are saturated or hydrogenated. Optimal level of this cholesterol is less than 100. Over 130 starts to get risky for heart disease.    High-density lipoprotein (HDL) cholesterol  Also known as good cholesterol, this type of cholesterol actually carries cholesterol away from your arteries and may, therefore, help lower your risk of having a heart attack. You want this level to be high (ideally greater than 60). It is a risk to have a level less than 40. You can raise this good cholesterol by eating olive oil, canola oil, avocados, or nuts. Exercise raises this level, too. Fat    Fat is calorie dense and packs a lot of calories into a small amount of food. Even though fats should be limited due to their high calorie content, not all fats are bad. In fact, some fats are quite healthful. Fat can be broken down into four main types. The good-for-you fats are:   Monounsaturated fat  found in oils such as olive and canola, avocados, and nuts and natural nut butters; can decrease cholesterol levels, while keeping levels of HDL cholesterol high   Polyunsaturated fat  found in oils such as safflower, sunflower, soybean, corn, and sesame; can decrease total cholesterol and LDL cholesterol   Omega-3 fatty acids  particularly those found in fatty fish (such as salmon, trout, tuna, mackerel, herring, and sardines); can decrease risk of arrhythmias, decrease triglyceride levels, and slightly lower blood pressure   The fats that you want to limit are:   Saturated fat  found in animal products, many fast foods, and a few vegetables; increases total blood cholesterol, including LDL levels   Animal fats that are saturated include: butter, lard, whole-milk dairy products, meat fat, and poultry skin   Vegetable fats that are saturated include: hydrogenated shortening, palm oil, coconut oil, cocoa butter   Hydrogenated or trans fat  found in margarine and vegetable shortening, most shelf stable snack foods, and fried foods; increases LDL and decreases HDL     It is generally recommended that you limit your total fat for the day to less than 30% of your total calories.  If you follow an 1800-calorie heart healthy diet, for example, this would mean 60 grams of fat or less per day. Saturated fat and trans fat in your diet raises your blood cholesterol the most, much more than dietary cholesterol does. For this reason, on a heart-healthy diet, less than 7% of your calories should come from saturated fat and ideally 0% from trans fat. On an 1800-calorie diet, this translates into less than 14 grams of saturated fat per day, leaving 46 grams of fat to come from mono- and polyunsaturated fats.    Food Choices on a Heart Healthy Diet   Food Category   Foods Recommended   Foods to Avoid   Grains   Breads and rolls without salted tops Most dry and cooked cereals Unsalted crackers and breadsticks Low-sodium or homemade breadcrumbs or stuffing All rice and pastas   Breads, rolls, and crackers with salted tops High-fat baked goods (eg, muffins, donuts, pastries) Quick breads, self-rising flour, and biscuit mixes Regular bread crumbs Instant hot cereals Commercially prepared rice, pasta, or stuffing mixes   Vegetables   Most fresh, frozen, and low-sodium canned vegetables Low-sodium and salt-free vegetable juices Canned vegetables if unsalted or rinsed   Regular canned vegetables and juices, including sauerkraut and pickled vegetables Frozen vegetables with sauces Commercially prepared potato and vegetable mixes   Fruits   Most fresh, frozen, and canned fruits All fruit juices   Fruits processed with salt or sodium   Milk   Nonfat or low-fat (1%) milk Nonfat or low-fat yogurt Cottage cheese, low-fat ricotta, cheeses labeled as low-fat and low-sodium   Whole milk Reduced-fat (2%) milk Malted and chocolate milk Full fat yogurt Most cheeses (unless low-fat and low salt) Buttermilk (no more than 1 cup per week)   Meats and Beans   Lean cuts of fresh or frozen beef, veal, lamb, or pork (look for the word loin) Fresh or frozen poultry without the skin Fresh or frozen fish and some shellfish Egg whites and egg substitutes (Limit whole eggs to three per week) Tofu Nuts or seeds (unsalted, dry-roasted), low-sodium peanut butter Dried peas, beans, and lentils   Any smoked, cured, salted, or canned meat, fish, or poultry (including mccabe, chipped beef, cold cuts, hot dogs, sausages, sardines, and anchovies) Poultry skins Breaded and/or fried fish or meats Canned peas, beans, and lentils Salted nuts   Fats and Oils   Olive oil and canola oil Low-sodium, low-fat salad dressings and mayonnaise   Butter, margarine, coconut and palm oils, mccabe fat   Snacks, Sweets, and Condiments   Low-sodium or unsalted versions of broths, soups, soy sauce, and condiments Pepper, herbs, and spices; vinegar, lemon, or lime juice Low-fat frozen desserts (yogurt, sherbet, fruit bars) Sugar, cocoa powder, honey, syrup, jam, and preserves Low-fat, trans-fat free cookies, cakes, and pies Devyn and animal crackers, fig bars, caden snaps   High-fat desserts Broth, soups, gravies, and sauces, made from instant mixes or other high-sodium ingredients Salted snack foods Canned olives Meat tenderizers, seasoning salt, and most flavored vinegars   Beverages   Low-sodium carbonated beverages Tea and coffee in moderation Soy milk   Commercially softened water   Suggestions   Make whole grains, fruits, and vegetables the base of your diet. Choose heart-healthy fats such as canola, olive, and flaxseed oil, and foods high in heart-healthy fats, such as nuts, seeds, soybeans, tofu, and fish. Eat fish at least twice per week; the fish highest in omega-3 fatty acids and lowest in mercury include salmon, herring, mackerel, sardines, and canned chunk light tuna. If you eat fish less than twice per week or have high triglycerides, talk to your doctor about taking fish oil supplements. Read food labels. For products low in fat and cholesterol, look for fat free, low-fat, cholesterol free, saturated fat free, and trans fat freeAlso scan the Nutrition Facts Label, which lists saturated fat, trans fat, and cholesterol amounts. For products low in sodium, look for sodium free, very low sodium, low sodium, no added salt, and unsalted   Skip the salt when cooking or at the table; if food needs more flavor, get creative and try out different herbs and spices. Garlic and onion also add substantial flavor to foods. Trim any visible fat off meat and poultry before cooking, and drain the fat off after cueto. Use cooking methods that require little or no added fat, such as grilling, boiling, baking, poaching, broiling, roasting, steaming, stir-frying, and sauting. Avoid fast food and convenience food. They tend to be high in saturated and trans fat and have a lot of added salt. Talk to a registered dietitian for individualized diet advice. Last Reviewed: March 2011 Indigo Sheth MS, MPH, RD   Updated: 3/29/2011     Heart-Healthy Diet   Sodium, Fat, and Cholesterol Controlled Diet       What Is a Heart Healthy Diet? A heart-healthy diet is one that limits sodium , certain types of fat , and cholesterol . This type of diet is recommended for:   People with any form of cardiovascular disease (eg, coronary heart disease , peripheral vascular disease , previous heart attack , previous stroke )   People with risk factors for cardiovascular disease, such as high blood pressure , high cholesterol , or diabetes   Anyone who wants to lower their risk of developing cardiovascular disease   Sodium    Sodium is a mineral found in many foods. In general, most people consume much more sodium than they need. Diets high in sodium can increase blood pressure and lead to edema (water retention). On a heart-healthy diet, you should consume no more than 2,300 mg (milligrams) of sodium per dayabout the amount in one teaspoon of table salt. The foods highest in sodium include table salt (about 50% sodium), processed foods, convenience foods, and preserved foods. Cholesterol    Cholesterol is a fat-like, waxy substance in your blood.  Our bodies make some cholesterol. It is also found in animal products, with the highest amounts in fatty meat, egg yolks, whole milk, cheese, shellfish, and organ meats. On a heart-healthy diet, you should limit your cholesterol intake to less than 200 mg per day. It is normal and important to have some cholesterol in your bloodstream. But too much cholesterol can cause plaque to build up within your arteries, which can eventually lead to a heart attack or stroke. The two types of cholesterol that are most commonly referred to are:   Low-density lipoprotein (LDL) cholesterol  Also known as bad cholesterol, this is the cholesterol that tends to build up along your arteries. Bad cholesterol levels are increased by eating fats that are saturated or hydrogenated. Optimal level of this cholesterol is less than 100. Over 130 starts to get risky for heart disease. High-density lipoprotein (HDL) cholesterol  Also known as good cholesterol, this type of cholesterol actually carries cholesterol away from your arteries and may, therefore, help lower your risk of having a heart attack. You want this level to be high (ideally greater than 60). It is a risk to have a level less than 40. You can raise this good cholesterol by eating olive oil, canola oil, avocados, or nuts. Exercise raises this level, too. Fat    Fat is calorie dense and packs a lot of calories into a small amount of food. Even though fats should be limited due to their high calorie content, not all fats are bad. In fact, some fats are quite healthful. Fat can be broken down into four main types.    The good-for-you fats are:   Monounsaturated fat  found in oils such as olive and canola, avocados, and nuts and natural nut butters; can decrease cholesterol levels, while keeping levels of HDL cholesterol high   Polyunsaturated fat  found in oils such as safflower, sunflower, soybean, corn, and sesame; can decrease total cholesterol and LDL cholesterol   Omega-3 fatty acids  particularly those found in fatty fish (such as salmon, trout, tuna, mackerel, herring, and sardines); can decrease risk of arrhythmias, decrease triglyceride levels, and slightly lower blood pressure   The fats that you want to limit are:   Saturated fat  found in animal products, many fast foods, and a few vegetables; increases total blood cholesterol, including LDL levels   Animal fats that are saturated include: butter, lard, whole-milk dairy products, meat fat, and poultry skin   Vegetable fats that are saturated include: hydrogenated shortening, palm oil, coconut oil, cocoa butter   Hydrogenated or trans fat  found in margarine and vegetable shortening, most shelf stable snack foods, and fried foods; increases LDL and decreases HDL     It is generally recommended that you limit your total fat for the day to less than 30% of your total calories. If you follow an 1800-calorie heart healthy diet, for example, this would mean 60 grams of fat or less per day. Saturated fat and trans fat in your diet raises your blood cholesterol the most, much more than dietary cholesterol does. For this reason, on a heart-healthy diet, less than 7% of your calories should come from saturated fat and ideally 0% from trans fat. On an 1800-calorie diet, this translates into less than 14 grams of saturated fat per day, leaving 46 grams of fat to come from mono- and polyunsaturated fats.    Food Choices on a Heart Healthy Diet   Food Category   Foods Recommended   Foods to Avoid   Grains   Breads and rolls without salted tops Most dry and cooked cereals Unsalted crackers and breadsticks Low-sodium or homemade breadcrumbs or stuffing All rice and pastas   Breads, rolls, and crackers with salted tops High-fat baked goods (eg, muffins, donuts, pastries) Quick breads, self-rising flour, and biscuit mixes Regular bread crumbs Instant hot cereals Commercially prepared rice, pasta, or stuffing mixes   Vegetables   Most fresh, frozen, and low-sodium canned vegetables Low-sodium and salt-free vegetable juices Canned vegetables if unsalted or rinsed   Regular canned vegetables and juices, including sauerkraut and pickled vegetables Frozen vegetables with sauces Commercially prepared potato and vegetable mixes   Fruits   Most fresh, frozen, and canned fruits All fruit juices   Fruits processed with salt or sodium   Milk   Nonfat or low-fat (1%) milk Nonfat or low-fat yogurt Cottage cheese, low-fat ricotta, cheeses labeled as low-fat and low-sodium   Whole milk Reduced-fat (2%) milk Malted and chocolate milk Full fat yogurt Most cheeses (unless low-fat and low salt) Buttermilk (no more than 1 cup per week)   Meats and Beans   Lean cuts of fresh or frozen beef, veal, lamb, or pork (look for the word loin) Fresh or frozen poultry without the skin Fresh or frozen fish and some shellfish Egg whites and egg substitutes (Limit whole eggs to three per week) Tofu Nuts or seeds (unsalted, dry-roasted), low-sodium peanut butter Dried peas, beans, and lentils   Any smoked, cured, salted, or canned meat, fish, or poultry (including mccabe, chipped beef, cold cuts, hot dogs, sausages, sardines, and anchovies) Poultry skins Breaded and/or fried fish or meats Canned peas, beans, and lentils Salted nuts   Fats and Oils   Olive oil and canola oil Low-sodium, low-fat salad dressings and mayonnaise   Butter, margarine, coconut and palm oils, mccabe fat   Snacks, Sweets, and Condiments   Low-sodium or unsalted versions of broths, soups, soy sauce, and condiments Pepper, herbs, and spices; vinegar, lemon, or lime juice Low-fat frozen desserts (yogurt, sherbet, fruit bars) Sugar, cocoa powder, honey, syrup, jam, and preserves Low-fat, trans-fat free cookies, cakes, and pies Devyn and animal crackers, fig bars, caden snaps   High-fat desserts Broth, soups, gravies, and sauces, made from instant mixes or other high-sodium ingredients Salted snack foods Canned olives Meat tenderizers, seasoning salt, and most flavored vinegars   Beverages   Low-sodium carbonated beverages Tea and coffee in moderation Soy milk   Commercially softened water   Suggestions   Make whole grains, fruits, and vegetables the base of your diet. Choose heart-healthy fats such as canola, olive, and flaxseed oil, and foods high in heart-healthy fats, such as nuts, seeds, soybeans, tofu, and fish. Eat fish at least twice per week; the fish highest in omega-3 fatty acids and lowest in mercury include salmon, herring, mackerel, sardines, and canned chunk light tuna. If you eat fish less than twice per week or have high triglycerides, talk to your doctor about taking fish oil supplements. Read food labels. For products low in fat and cholesterol, look for fat free, low-fat, cholesterol free, saturated fat free, and trans fat freeAlso scan the Nutrition Facts Label, which lists saturated fat, trans fat, and cholesterol amounts. For products low in sodium, look for sodium free, very low sodium, low sodium, no added salt, and unsalted   Skip the salt when cooking or at the table; if food needs more flavor, get creative and try out different herbs and spices. Garlic and onion also add substantial flavor to foods. Trim any visible fat off meat and poultry before cooking, and drain the fat off after cueto. Use cooking methods that require little or no added fat, such as grilling, boiling, baking, poaching, broiling, roasting, steaming, stir-frying, and sauting. Avoid fast food and convenience food. They tend to be high in saturated and trans fat and have a lot of added salt. Talk to a registered dietitian for individualized diet advice. Last Reviewed: March 2011 Christopher Ng MS, MPH, RD   Updated: 3/29/2011       DASH Diet: After Your Visit  Your Care Instructions  The DASH diet is an eating plan that can help lower your blood pressure.  DASH stands for Dietary Approaches to Stop Hypertension. Hypertension is high blood pressure. The DASH diet focuses on eating foods that are high in calcium, potassium, and magnesium. These nutrients can lower blood pressure. The foods that are highest in these nutrients are fruits, vegetables, low-fat dairy products, nuts, seeds, and legumes. But taking calcium, potassium, and magnesium supplements instead of eating foods that are high in those nutrients does not have the same effect. The DASH diet also includes whole grains, fish, and poultry. The DASH diet is one of several lifestyle changes your doctor may recommend to lower your high blood pressure. Your doctor may also want you to decrease the amount of sodium in your diet. Lowering sodium while following the DASH diet can lower blood pressure even further than just the DASH diet alone. Follow-up care is a key part of your treatment and safety. Be sure to make and go to all appointments, and call your doctor if you are having problems. Its also a good idea to know your test results and keep a list of the medicines you take. How can you care for yourself at home? Following the DASH diet  Eat 4 to 5 servings of fruit each day. A serving is 1 medium-sized piece of fruit, ½ cup chopped or canned fruit, 1/4 cup dried fruit, or 4 ounces (½ cup) of fruit juice. Choose fruit more often than fruit juice. Eat 4 to 5 servings of vegetables each day. A serving is 1 cup of lettuce or raw leafy vegetables, ½ cup of chopped or cooked vegetables, or 4 ounces (½ cup) of vegetable juice. Choose vegetables more often than vegetable juice. Get 2 to 3 servings of low-fat and fat-free dairy each day. A serving is 8 ounces of milk, 1 cup of yogurt, or 1 ½ ounces of cheese. Eat 7 to 8 servings of grains each day. A serving is 1 slice of bread, 1 ounce of dry cereal, or ½ cup of cooked rice, pasta, or cooked cereal. Try to choose whole-grain products as much as possible.   Limit lean meat, poultry, and fish to 6 ounces each day. Six ounces is about the size of two decks of cards. Eat 4 to 5 servings of nuts, seeds, and legumes (cooked dried beans, lentils, and split peas) each week. A serving is 1/3 cup of nuts, 2 tablespoons of seeds, or ½ cup cooked dried beans or peas. Limit sweets and added sugars to 5 servings or less a week. A serving is 1 tablespoon jelly or jam, ½ cup sorbet, or 1 cup of lemonade. Tips for success  Start small. Do not try to make dramatic changes to your diet all at once. You might feel that you are missing out on your favorite foods and then be more likely to not follow the plan. Make small changes, and stick with them. Once those changes become habit, add a few more changes. Try some of the following:  Make it a goal to eat a fruit or vegetable at every meal and at snacks. This will make it easy to get the recommended amount of fruits and vegetables each day. Try yogurt topped with fruit and nuts for a snack or healthy dessert. Add lettuce, tomato, cucumber, and onion to sandwiches. Combine a ready-made pizza crust with low-fat mozzarella cheese and lots of vegetable toppings. Try using tomatoes, squash, spinach, broccoli, carrots, cauliflower, and onions. Have a variety of cut-up vegetables with a low-fat dip as an appetizer instead of chips and dip. Sprinkle sunflower seeds or chopped almonds over salads. Or try adding chopped walnuts or almonds to cooked vegetables. Try some vegetarian meals using beans and peas. Add garbanzo or kidney beans to salads. Make burritos and tacos with mashed anand beans or black beans    © 2083-8600 [employer]. Care instructions adapted under license by [facility].  This care instruction is for use with your licensed healthcare professional. If you have questions about a medical condition or this instruction, always ask your healthcare professional. [place] [address] any warranty or liability for your use of this information. Content Version: 9.4.64014; Last Revised: March 15, 2012                Patient information: Weight loss treatments    INTRODUCTION -- Obesity is a major international problem, and Americans are among the heaviest people in the world. The percentage of obese people in the [place] has risen steadily. Many people find that although they initially lose weight by dieting, they quickly regain the weight after the diet ends. Because it so hard to keep weight off over time, it is important to have as much information and support as possible before starting a diet. You are most likely to be successful in losing weight and keeping it off when you believe that your body weight can be controlled. STARTING A WEIGHT LOSS PROGRAM -- Some people like to talk to their doctor or nurse to get help choosing the best plan, monitoring progress, and getting advice and support along the way. To know what treatment (or combination of treatments) will work best, determine your body mass index (BMI) and waist circumference (measurement). The BMI is calculated from your height and weight. A person with a BMI between 25 and 29.9 is considered overweight   A person with a BMI of 30 or greater is considered to be obese  A waist circumference greater than 35 inches (88 cm) in women and 40 inches (102 cm) in men increases the risk of obesity-related complications, such as heart disease and diabetes. People who are obese and who have a larger waist size may need more aggressive weight loss treatment than others. Talk to your doctor or nurse for advice. Types of treatment -- Based on your measurements and your medical history, your doctor or nurse can determine what combination of weight loss treatments would work best for you. Treatments may include changes in lifestyle, exercise, dieting, and, in some cases, weight loss medicines or weight loss surgery.  Weight loss surgery, also called bariatric surgery, is reserved for people with severe obesity who have not responded to other weight loss treatments. SETTING A WEIGHT LOSS GOAL -- It is important to set a realistic weight loss goal. Your first goal should be to avoid gaining more weight and staying at your current weight (or within 5 percent). Many people have a \"dream\" weight that is difficult or impossible to achieve. People at high risk of developing diabetes who are able to lose 5 percent of their body weight and maintain this weight will reduce their risk of developing diabetes by about 50 percent and reduce their blood pressure. This is a success. Losing more than 15 percent of your body weight and staying at this weight is an extremely good result, even if you never reach your \"dream\" or \"ideal\" weight. LIFESTYLE CHANGES -- Programs that help you to change your lifestyle are usually run by psychologists or other professionals. The goals of lifestyle changes are to help you change your eating habits, become more active, and be more aware of how much you eat and exercise, helping you to make healthier choices. This type of treatment can be broken down into three steps: The triggers that make you want to eat   Eating   What happens after you eat  Triggers to eat -- Determining what triggers you to eat involves figuring out what foods you eat and where and when you eat. To figure out what triggers you to eat, keep a record for a few days of everything you eat, the places where you eat, how often you eat, and the emotions you were feeling when you ate. For some people, the trigger is related to a certain time of day or night. For others, the trigger is related to a certain place, like sitting at a desk working. Eating -- You can change your eating habits by breaking the chain of events between the trigger for eating and eating itself. There are many ways to do this.  For instance, you can:  Limit where you eat to a few places (eg, dining room)   Restrict the number of utensils (eg, only a fork) used for eating   Drink a sip of water between each bite   Chew your food a certain number of times   Get up and stop eating every few minutes  What happens after you eat -- Rewarding yourself for good eating behaviors can help you to develop better habits. This is not a reward for weight loss; instead, it is a reward for changing unhealthy behaviors. Do not use food as a reward. Some people find money, clothing, or personal care (eg, a hair cut, manicure, or massage) to be effective rewards. Treat yourself immediately after making better eating choices to reinforce the value of the good behavior. You need to have clear behavior goals, and you must have a time frame for reaching your goals. Reward small changes along the way to your final goal.  Other factors that contribute to successful weight loss -- Changing your behavior involves more than just changing unhealthy eating habits; it also involves finding people around you to support your weight loss, reducing stress, and learning to be strong when tempted by food. Establish a \"kirstie\" system -- Having a friend or family member available to provide support and reinforce good behavior is very helpful. The support person needs to understand your goals. Learn to be strong -- Learning to be strong when tempted by food is an important part of losing weight. As an example, you will need to learn how to say \"no\" and continue to say no when urged to eat at parties and social gatherings. Develop strategies for events before you go, such as eating before you go or taking low-calorie snacks and drinks with you. Develop a support system -- Having a support system is helpful when losing weight. This is why many commercial groups are successful. Family support is also essential; if your family does not support your efforts to lose weight, this can slow your progress or even keep you from losing weight.    Positive thinking -- People often have conversations with themselves in their head; these conversations can be positive or negative. If you eat a piece of cake that was not planned, you may respond by thinking, \"Oh, you stupid idiot, you've blown your diet! \" and as a result, you may eat more cake. A positive thought for the same event could be, \"Well, I ate cake when it was not on my plan. Now I should do something to get back on track. \" A positive approach is much more likely to be successful than a negative one. Reduce stress -- Although stress is a part of everyday life, it can trigger uncontrolled eating in some people. It is important to find a way to get through these difficult times without eating or by eating low-calorie food, like raw vegetables. It may be helpful to imagine a relaxing place that allows you to temporarily escape from stress. With deep breaths and closed eyes, you can imagine this relaxing place for a few minutes. Self-help programs -- Self-help programs like [facility] Watchers®, Overeaters Anonymous®, and Take [employer]© work for some people. As with all weight loss programs, you are most likely to be successful with these plans if you make long-term changes in how you eat. CHOOSING A DIET -- A calorie is a unit of energy found in food. Your body needs calories to function. The goal of any diet is to burn up more calories than you eat. How quickly you lose weight depends upon several factors, such as your age, gender, and starting weight. Older people have a slower metabolism than young people, so they lose weight more slowly. Men lose more weight than women of similar height and weight when dieting because they use more energy. People who are extremely overweight lose weight more quickly than those who are only mildly overweight. Try not to drink alcohol or drinks with added sugar, and most sweets (candy, cakes, cookies), since they rarely contain important nutrients.   Portion-controlled diets -- One simple way to diet is to buy packaged foods, like frozen low-calorie meals or meal-replacement canned drinks. A typical meal plan for one day may include:  A meal-replacement drink or breakfast bar for breakfast   A meal-replacement drink or a frozen low-calorie (250 to 350 calories) meal for lunch   A frozen low-calorie meal or other prepackaged, calorie-controlled meal, along with extra vegetables for dinner  This would give you 1000 to 1500 calories per day. Low-fat diet -- To reduce the amount of fat in your diet, you can:  Eat low-fat foods. Low-fat foods are those that contain less than 30 percent of calories from fat. Fat is listed on the food facts label (figure 1). Count fat grams. For a 1500 calorie diet, this would mean about 45 g or fewer of fat per day. Low-carbohydrate diet -- Low- and very-low-carbohydrate diets (eg, Atkins diet, [employer]) have become popular ways to lose weight quickly. With a very-low-carbohydrate diet, you eat between 0 and 60 grams of carbohydrates per day (a standard diet contains 200 to 300 grams of carbohydrates)   With a low-carbohydrate diet, you eat between 60 and 130 grams of carbohydrates per day  Carbohydrates are found in fruits, vegetables, and grains (including breads, rice, pasta, and cereal), alcoholic beverages, and in dairy products. Meat and fish do not contain carbohydrates. Side effects of very-low-carbohydrate diets can include constipation, headache, bad breath, muscle cramps, diarrhea, and weakness. Mediterranean diet -- The term \"Mediterranean diet\" refers to a way of eating that is common in [place] regions around [facility]. Although there is some variation in Mediterranean diets, there are some similarities.  Most Mediterranean diets include:  A high level of monounsaturated fats (from olive or canola oil, walnuts, pecans, almonds) and a low level of saturated fats (from butter)   A high amount of vegetables, fruits, legumes, and grains (7 to 10 servings of fruits and vegetables per day)   A moderate amount of milk and dairy products, mostly in the form of cheese. Use low-fat dairy products (skim milk, fat-free yogurt, low-fat cheese). A relatively low amount of red meat and meat products. Substitute fish or poultry for red meat. For those who drink alcohol, a modest amount (mainly as red wine) may help to protect against cardiovascular disease. A modest amount is up to one (4 ounce) glass per day for women and up to two glasses per day for men. Which diet is best? -- Studies have compared different diets, including:  Very-low-carbohydrate (Atkins)   Macronutrient balance controlling glycemic load (Zone®)   Reduced-calorie (Weight Watchers®)   Very-low-fat (Ornish)  No one diet is \"best\" for weight loss. Any diet will help you to lose weight if you stick with the diet. Therefore, it is important to choose a diet that includes foods you like. Fad diets -- Fad diets often promise quick weight loss (more than 1 to 2 pounds per week) and may claim that you do not need to exercise or give up favorite foods. Some fad diets cost a lot of money, because you have to pay for seminars or pills. Fad diets generally lack any scientific evidence that they are safe and effective, but instead rely on \"before\" and \"after\" photos or testimonials. Diets that sound too good to be true usually are. These plans are a waste of time and money and are not recommended. A doctor, nurse, or nutritionist can help you find a safe and effective way to lose weight and keep it off. WEIGHT LOSS MEDICINES -- Taking a weight loss medicine may be helpful when used in combination with diet, exercise, and lifestyle changes. However, it is important to understand the risks and benefits of these medicines.  It is also important to be realistic about your goal weight using a weight loss medicine; you may not reach your \"dream\" weight, but you may be able to reduce your risk of diabetes or heart disease. Weight loss medicines may be recommended for people who have not been able to lose weight with diet and exercise who have a:  BMI of 30 or more    BMI between 27 and 29.9 and have other medical problems, such as diabetes, high cholesterol, or high blood pressure  Two weight loss medicines are approved in the [place] for long-term use. These are sibutramine and orlistat. Other weight loss medicines (phentermine, diethylpropion) are available but are only approved for short-term use (up to 12 weeks). Sibutramine -- Sibutramine (Meridia®, Reductil®) is a medicine that reduces your appetite. In people who take the medicine for one year, the average weight loss is 10 percent of the initial body weight (5 percent more than those who took a placebo treatment). Side effects of sibutramine include insomnia, dry mouth, and constipation. Increases in blood pressure can occur. Therefore, blood pressure is usually monitored during treatment. There is no evidence that sibutramine causes heart or lung problems (like dexfenfluramine and fenfluramine (Phen/Fen)). However, experts agree that sibutramine should not used by people with coronary heart disease, heart failure, uncontrolled hypertension, stroke, irregular heart rhythms, or peripheral vascular disease (poor circulation in the legs). Orlistat -- Orlistat (Xenical® 120 mg capsules) is a medicine that reduces the amount of fat your body absorbs from the foods you eat. A lower-dose version is now available without a prescription (Travis® 60 mg capsules) in many countries, including the [place]. The medicine is recommended three times per day, taken with a meal; you can skip a dose if you skip a meal or if the meal contains no fat. After one year of treatment with orlistat, the average weight loss is approximately 8 to 10 percent of initial body weight (4 percent more than in those who took a placebo).  Cholesterol levels often improve, and blood pressure sometimes falls. In people with diabetes, orlistat may help control blood sugar levels. Side effects occur in 15 to 10 percent of people and may include stomach cramps, gas, diarrhea, leakage of stool, or oily stools. These problems are more likely when you take orlistat with a high-fat meal (if more than 30 percent of calories in the meal are from fat). Side effects usually improve as you learn to avoid high-fat foods. Severe liver injury has been reported rarely in patients taking orlistat, but it is not known if orlistat caused the liver problems. Diet supplements -- Diet supplements are widely used by people who are trying to lose weight, although the safety and efficacy of these supplements are often unproven. A few of the more common diet supplements are discussed below; none of these are recommended because they have not been studied carefully, and there is no proof they are safe or effective. Chitosan and wheat dextrin are ineffective for weight loss, and their use is not recommended. Ephedra, a compound related to ephedrine, is no longer available in the [place] due to safety concerns. Many nonprescription diet pills previously contained ephedra. Although some studies have shown that ephedra helps with weight loss, there can be serious side effects (psychiatric symptoms, palpitations, and stomach upset), including death. There are not enough data about the safety and efficacy of chromium, ginseng, glucomannan, green tea, hydroxycitric acid, L carnitine, psyllium, pyruvate supplements, [place] wort, and conjugated linoleic acid. Two supplements from [facility], [address] [place] (also known as the Trish Robert 15 pill) and Herbathin dietary supplement, have been shown to contain prescription drugs. Hoodia gordonii is a dietary supplement derived from a plant in [place]. It is not recommended because there is no proof that it is safe or effective. Bitter orange (Citrus aurantium) can increase your heart rate and blood pressure and is not recommended. SHOULD I HAVE SURGERY TO LOSE WEIGHT? -- Weight loss surgery is recommended ONLY for people with one of the following:  Severe obesity (body mass index above 40) (calculator 1 and calculator 2) who have not responded to diet, exercise, or weight loss medicines   Body mass index between 35 and 40, along with a serious medical problem (including diabetes, severe joint pain, or sleep apnea) that would improve with weight loss  You should be sure that you understand the potential risks and benefits of weight loss surgery. You must be motivated and willing to make lifelong changes in how you eat to reach and maintain a healthier weight after surgery. You must also be realistic about weight loss after surgery (see 'Effectiveness of weight loss surgery' below). PREPARING FOR WEIGHT LOSS SURGERY -- Most people who have weight loss surgery will meet with several specialists before surgery is scheduled. This often includes a dietitian, mental health counselor, a doctor who specializes in care of obese people, and a surgeon who performs weight loss surgery (bariatric surgeon). You may need to work with these providers for several weeks or months before surgery. The nutritionist will explain what and how much you will be able to eat after surgery. You may also need to lose a small amount of weight before surgery. The mental health specialist will help you to cope with stress and other factors that can make it harder to lose weight or trigger you to eat   The medical doctor will determine whether you need other tests, counseling, or treatment before surgery. He or she might also help you begin a medical weight loss program so that you can lose some weight before surgery. The bariatric surgeon will meet with you to discuss the surgeries available to treat obesity.  He or she will also make sure you are a good candidate for surgery. TYPES OF WEIGHT LOSS SURGERY -- There are several types of weight loss surgeries, the most common being lap banding, gastric bypass, and gastric sleeve. Lap banding -- Laparoscopic adjustable gastric banding (LAGB), or lap banding, is a surgery that uses an adjustable band around the opening to the stomach (figure 1). This reduces the amount of food that you can eat at one time. Lap banding is done through small incisions, with a laparoscope. The band can be adjusted after surgery, allowing you to eat more or less food. Adjustments to the size and tightness of the band are made by using a needle to add or remove fluid from a port (a small container under the skin that is connected to the band). Adding fluid to the band makes it tighter which restricts the amount of food you can eat and may help you to lose more weight. Lap banding is a popular choice because it is relatively simple to perform, can be adjusted or removed, and has a low risk of serious complications immediately after surgery. However, weight loss with the lap band depends on your ability to follow the program closely. You will need to prepare nutritious meals that [facility] with\" the band, not against it. For example, the lap band will not work well if you eat or drink a large amount of liquid calories (like ice cream). The band will not help you to feel full when you eat/drink liquid calories. Weight loss ranges from 45 to 75 percent after two years. As an example, a person who is 120 pounds overweight could expect to lose approximately 54 to 90 pounds in the two years after lap banding. Gastric bypass -- Emelyn-en-Y gastric bypass, also called gastric bypass, helps you to lose weight by reducing the amount of food you can eat and reducing the number of calories and nutrients you absorb from the food you eat.   To perform gastric bypass, a surgeon creates a small stomach pouch by dividing the stomach and attaching it to the small intestine. This helps you to lose weight in two ways: The smaller stomach can hold less food than before surgery. This causes you to feel full after eating a very small amount of food or liquid. Over time, the pouch might stretch, allowing you to eat more food. The body absorbs fewer calories, since food bypasses most of the stomach as well as the upper small intestine. This new arrangement seems to decrease your appetite and change how you break down foods by changing the release of various hormones. Gastric bypass can be performed as open surgery (through an incision on the abdomen) or laparoscopically, which uses smaller incisions and smaller instruments. Both the laparoscopic and open techniques have risks and benefits. You and your surgeon should work together to decide which surgery, if any, is right for you. Gastric bypass has a high success rate, and people lose an average of 62 to 68 percent of their excess body weight in the first year. Weight loss typically levels off after one to two years, with an overall excess weight loss between 50 and 75 percent. For a person who is 120 pounds overweight, an average of 60 to 90 pounds of weight loss would be expected. Gastric sleeve -- Gastric sleeve, also known as sleeve gastrectomy, is a surgery that reduces the size of the stomach and makes it into a narrow tube (figure 3). The new stomach is much smaller and produces less of the hormone (ghrelin) that causes hunger, helping you feel satisfied with less food. Sleeve gastrectomy is safer than gastric bypass because the intestines are not rearranged, and there is less chance of malnutrition. It also appears to control hunger better than lap banding. It might be safer than the lap banding because no foreign materials are used. The gastric sleeve has a good success rate, and people lose an average of 33 percent of their excess body weight in the first year.  For a person who is 120 pounds overweight, this would mean losing about 40 pounds in the first year. WEIGHT LOSS SURGERY COMPLICATIONS -- A variety of complications can occur with weight loss surgery. The risks of surgery depend upon which surgery you have and any medical problems you had before surgery. Some of the more common early surgical complications (one to six weeks after surgery) include:  Bleeding   Infection   Blockage or tear in the bowels   Need for further surgery  Important medical complications after surgery can include blood clots in the legs or lungs, heart attack, pneumonia, and urinary tract infection. Complications are less likely when surgery is performed in centers that are experienced in weight loss surgery. In general, centers with experience in weight loss surgery have:  Board-certified doctors and surgeons   A team of support staff (dietitians, counselors, nurses)   Long-term follow-up after surgery   Hospital staff experienced with the care of weight loss patients. This includes nurses who are trained in the care of patients immediately after surgery and anesthesiologists who are experienced in caring for the morbidly obese. EFFECTIVENESS OF WEIGHT LOSS SURGERY -- The goal of weight loss surgery is to reduce the risk of illness or death associated with obesity. Weight loss surgery can also help you to feel and look better, reduce the amount of money you spend on medicines, and cut down on sick days. As an example, weight loss surgery can improve health problems related to obesity (diabetes, high blood pressure, high cholesterol, sleep apnea) to the point that you need less or no medicine. Finally, weight loss surgery might reduce your risk of developing heart disease, cancer, and certain infections. AFTER WEIGHT LOSS SURGERY -- You will need to stay in the hospital until your team feels that it is safe for you to leave (on average, one to three days). Do not drive if you are taking prescription pain medicine.  Begin exercising as soon as possible once you have healed; most weight loss centers will design an exercise program for you. Once you are home, it is important to eat and drink exactly what your doctor and dietitian recommend. You will see your doctor, nurse, and dietitian on a regular basis after surgery to monitor your health, diet, and weight loss. You will be able to slowly increase how much you eat over time, although it will always be important to:  Eat small, frequent meals and not skip meals   Chew your food slowly and completely   Avoid eating while \"distracted\" (such as eating while watching TV)   Stop eating when you feel full   Drink liquids at least 30 minutes before or after eating   Avoid foods high in fat or sugar   Take vitamin supplements, as recommended  It can take several months to learn to listen to your body so that you know when you are hungry and when you are full. You may dislike foods you previously loved, and you may begin to prefer new foods. This can be a frustrating process for some people, so talk to your dietitian if you are having trouble. It usually takes between one and two years to lose weight after surgery. After reaching their goal weight, some people have plastic surgery (called \"body contouring\") to remove excess skin from the body, particularly in the abdominal area. Before you decide to have weight loss surgery, you must commit to staying healthy for life. This includes following up with your healthcare team, exercising most days of the week, and eating a sensible diet every day. It can be difficult to develop new eating and exercise habits after weight loss surgery, and you will have to work hard to stick to your goals. Recovering from surgery and losing weight can be stressful and emotional, and it is important to have the support of family and friends. Working with a , therapist, or support group can help you through the ups and downs.   WHERE TO GET MORE [facility] healthcare provider is the best source of information for questions and concerns related to your medical problem. This article will be updated as needed every four months on our Web site ([email or web address])    High-Fiber Diet     What Is Fiber? Dietary fiber is a form of carbohydrate found in plants that cannot be digested by humans. All plants contain fiber, including fruits, vegetables, grains, and legumes. Fiber is often classified into two categories: soluble and insoluble. Soluble fiber draws water into the bowel and can help slow digestion. Examples of foods that are high in soluble fiber include oatmeal, oat bran, barley, legumes (eg, beans and peas), apples, and strawberries. Insoluble fiber speeds digestion and can add bulk to the stool. Examples of foods that are high in insoluble fiber include whole-wheat products, wheat bran, cauliflower, green beans, and potatoes. Why Follow a High-Fiber Diet? A high-fiber diet is often recommended to prevent and treat constipation , hemorrhoids , diverticulitis , and irritable bowel syndrome . Eating a high-fiber diet can also help improve your cholesterol levels, lower your risk of coronary heart disease , reduce your risk of type 2 diabetes , and lower your weight. For people with type 1 or 2 diabetes, a high-fiber diet can also help stabilize blood sugar levels. How Much Fiber Should I Eat? A high-fiber diet should contain  20-35 grams  of fiber a day. This is actually the amount recommended for the general adult population; however, most Americans eat only 15 grams of fiber per day. Digestion of Fiber   Eating a higher fiber diet than usual can take some getting used to by your body's digestive system. To avoid the side effects of sudden increases in dietary fiber (eg, gas, cramping, bloating, and diarrhea), increase fiber gradually and be sure to drink plenty of fluids every day.    Tips for Increasing Fiber Intake Whenever possible, choose whole grains over refined grains (eg, brown rice instead of white rice, whole-wheat bread instead of white bread). Include a variety of grains in your diet, such as wheat, rye, barley, oats, quinoa, and bulgur. Eat more vegetarian-based meals. Here are some ideas: black bean burgers, eggplant lasagna, and veggie tofu stir-seymour. Choose high-fiber snacks, such as fruits, popcorn, whole-grain crackers, and nuts. Make whole-grain cereal or whole-grain toast part of your daily breakfast regime. When eating out, whether ordering a sandwich or dinner, ask for extra vegetables. When baking, replace part of the white flour with whole-wheat flour. Whole-wheat flour is particularly easy to incorporate into a recipe. High-Fiber Diet Eating Guide   Food Category   Foods Recommended   Notes   Grains   Whole-grain breads, muffins, bagels, or ramiro bread Rye bread Whole-wheat crackers or crisp breads Whole-grain or bran cereals Oatmeal, oat bran, or grits Wheat germ Whole-wheat pasta and brown rice   Read the ingredients list on food labels. Look for products that list \"whole\" as the first ingredient (eg, whole-wheat, whole oats). Choose cereals with at least 2 grams of fiber per serving. Vegetables   All vegetables, especially asparagus, bean sprouts, broccoli, [place] sprouts, cabbage, carrots, cauliflower, celery, corn, greens, green beans, green pepper, onions, peas, potatoes (with skin), snow peas, spinach, squash, sweet potatoes, tomatoes, zucchini   For maximum fiber intake, eat the peels of fruits and vegetablesjust be sure to wash them well first.   Fruits   All fruits, especially apples, berries, grapefruits, mangoes, nectarines, oranges, peaches, pears, dried fruits (figs, dates, prunes, raisins)   Choose raw fruits and vegetables over juice, cooked, or cannedraw fruit has more fiber. Dried fruit is also a good source of fiber.    Milk   With the exception of yogurt containing inulin (a type of fiber), dairy foods provide little fiber. Add more fiber by topping your yogurt or cottage cheese with fresh fruit, whole grain or bran cereals, nuts, or seeds. Meats and Beans   All beans and peas, especially Garbanzo beans, kidney beans, lentils, lima beans, split peas, and anand beans All nuts and seeds, especially almonds, peanuts, [place] nuts, cashews, peanut butter, walnuts, sesame and sunflower seeds All meat, poultry, fish, and eggs   Increase fiber in meat dishes by adding anand beans, kidney beans, black-eyed peas, bran, or oatmeal. If you are following a low-fat diet, use nuts and seeds only in moderation. Fats and Oils   All in moderation   Fats and oils do not provide fiber   Snacks, Sweets, and Condiments   Fruit Nuts Popcorn, whole-wheat pretzels, or trail mix made with dried fruits, nuts, and seeds Cakes, breads, and cookies made with oatmeal or whole-wheat flour   Most snack foods do not provide much fiber. Choose snacks with at least 2 grams of fiber per serving. Last Reviewed: March 2011 Mary Celeste MS, MPH, RD   Updated: 3/29/2011     Keep Your Memory Layla Banda       Many factors can affect your ability to remembera hectic lifestyle, aging, stress, chronic disease, and certain medicines. But, there are steps you can take to sharpen your mind and help preserve your memory. Challenge Your Brain   Regularly challenging your mind may help keeps it in top shape. Good mental exercises include:   Crossword puzzlesUse a dictionary if you need it; you will learn more that way. Brainteasers Try some! Crafts, such as wood working and sewing   Hobbies, such as gardening and building model airplanes   SocializingVisit old friends or join groups to meet new ones.    Reading   Learning a new language   Taking a class, whether it be art history or jose chi   TravelingExperience the food, history, and culture of your destination   Learning to use a computer   Going to museums, the theater, or thought-provoking movies   Changing things in your daily life, such as reversing your pattern in the grocery store or brushing your teeth using your nondominant hand   Use Memory Aids   There is no need to remember every detail on your own. These memory aids can help:   Calendars and day planners   Electronic organizers to store all sorts of helpful informationThese devices can \"beep\" to remind you of appointments. A book of days to record birthdays, anniversaries, and other occasions that occur on the same date every year   Detailed \"to-do\" lists and strategically placed sticky notes   Quick \"study\" sessionsBefore a gathering, review who will be there so their names will be fresh in your mind. Establish routinesFor example, keep your keys, wallet, and umbrella in the same place all the time or take medicine with your 8:00 AM glass of juice   Live a Healthy Life   Many actions that will keep your body strong will do the same for your mind. For example:   Talk to Your Doctor About Herbs and Supplements    Malnutrition and vitamin deficiencies can impair your mental function. For example, vitamin B12 deficiency can cause a range of symptoms, including confusion. But, what if your nutritional needs are being met? Can herbs and supplements still offer a benefit? Researchers have investigated a range of natural remedies, such as ginkgo , ginseng , and the supplement phosphatidylserine (PS). So far, though, the evidence is inconsistent as to whether these products can improve memory or thinking. If you are interested in taking herbs and supplements, talk to your doctor first because they may interact with other medicines that you are taking. Exercise Regularly    Among the many benefits of regular exercise are increased blood flow to the brain and decreased risk of certain diseases that can interfere with memory function. One study found that even moderate exercise has a beneficial effect. Examples of \"moderate\" exercise include:   Playing 18 holes of golf once a week, without a cart   Playing tennis twice a week   Walking one mile per day   Manage Stress    It can be tough to remember what is important when your mind is cluttered. Make time for relaxation. Choose activities that calm you down, and make it routine. Manage Chronic Conditions    Side effects of high blood pressure , diabetes, and heart disease can interfere with mental function. Many of the lifestyle steps discussed here can help manage these conditions. Strive to eat a healthy diet, exercise regularly, get stress under control, and follow your doctor's advice for your condition. Minimize Medications    Talk to your doctor about the medicines that you take. Some may be unnecessary. Also, healthy lifestyle habits may lower the need for certain drugs. Last Reviewed: April 2010 Mitra Mckeon MD   Updated: 4/13/2010     Smoking Cessation  This document explains the best ways for you to quit smoking and new treatments to help. It lists new medicines that can double or triple your chances of quitting and quitting for good. It also considers ways to avoid relapses and concerns you may have about quitting, including weight gain. NICOTINE: A POWERFUL ADDICTION  If you have tried to quit smoking, you know how hard it can be. It is hard because nicotine is a very addictive drug. For some people, it can be as addictive as heroin or cocaine. Usually, people make 2 or 3 tries, or more, before finally being able to quit. Each time you try to quit, you can learn about what helps and what hurts. Quitting takes hard work and a lot of effort, but you can quit smoking. QUITTING SMOKING IS ONE OF THE MOST IMPORTANT THINGS YOU WILL EVER DO. You will live longer, feel better, and live better. The impact on your body of quitting smoking is felt almost immediately:  Within 20 minutes, blood pressure decreases.  Pulse returns to its normal level.  After 8 hours, carbon monoxide levels in the blood return to normal. Oxygen level increases. After 24 hours, chance of heart attack starts to decrease. Breath, hair, and body stop smelling like smoke. After 48 hours, damaged nerve endings begin to recover. Sense of taste and smell improve. After 72 hours, the body is virtually free of nicotine. Bronchial tubes relax and breathing becomes easier. After 2 to 12 weeks, lungs can hold more air. Exercise becomes easier and circulation improves. Quitting will reduce your risk of having a heart attack, stroke, cancer, or lung disease:  After 1 year, the risk of coronary heart disease is cut in half. After 5 years, the risk of stroke falls to the same as a nonsmoker. After 10 years, the risk of lung cancer is cut in half and the risk of other cancers decreases significantly. After 15 years, the risk of coronary heart disease drops, usually to the level of a nonsmoker. If you are pregnant, quitting smoking will improve your chances of having a healthy baby. The people you live with, especially your children, will be healthier. You will have extra money to spend on things other than cigarettes. FIVE KEYS TO QUITTING  Studies have shown that these 5 steps will help you quit smoking and quit for good. You have the best chances of quitting if you use them together:  Get ready. Get support and encouragement. Learn new skills and behaviors. Get medicine to reduce your nicotine addiction and use it correctly. Be prepared for relapse or difficult situations. Be determined to continue trying to quit, even if you do not succeed at first.  1. GET READY  Set a quit date. Change your environment. Get rid of ALL cigarettes, ashtrays, matches, and lighters in your home, car, and place of work. Do not let people smoke in your home. Review your past attempts to quit. Think about what worked and what did not. Once you quit, do not smoke. NOT EVEN A PUFF!   2. GET SUPPORT AND ENCOURAGEMENT  Studies have shown that you have a better chance of being successful if you have help. You can get support in many ways. Tell your family, friends, and coworkers that you are going to quit and need their support. Ask them not to smoke around you. Talk to your caregivers (doctor, dentist, nurse, pharmacist, psychologist, and/or smoking counselor). Get individual, group, or telephone counseling and support. The more counseling you have, the better your chances are of quitting. Programs are available at [facility]. Call your local health department for information about programs in your area. Spiritual beliefs and practices may help some smokers quit. Quit meters are small computer programs online or downloadable that keep track of quit statistics, such as amount of \"quit-time,\" cigarettes not smoked, and money saved. Many smokers find one or more of the many self-help books available useful in helping them quit and stay off tobacco.  3. LEARN NEW SKILLS AND BEHAVIORS  Try to distract yourself from urges to smoke. Talk to someone, go for a walk, or occupy your time with a task. When you first try to quit, change your routine. Take a different route to work. Drink tea instead of coffee. Eat breakfast in a different place. Do something to reduce your stress. Take a hot bath, exercise, or read a book. Plan something enjoyable to do every day. Reward yourself for not smoking. Explore interactive web-based programs that specialize in helping you quit. 4. GET MEDICINE AND USE IT CORRECTLY  Medicines can help you stop smoking and decrease the urge to smoke. Combining medicine with the above behavioral methods and support can quadruple your chances of successfully quitting smoking. The [place] Food and Drug Administration (FDA) has approved 7 medicines to help you quit smoking. These medicines fall into 3 categories.   Nicotine replacement therapy (delivers nicotine to your body without the negative effects and risks of smoking):  Nicotine gum: Available over-the-counter. Nicotine lozenges: Available over-the-counter. Nicotine inhaler: Available by prescription. Nicotine nasal spray: Available by prescription. Nicotine skin patches (transdermal): Available by prescription and over-the-counter. Antidepressant medicine (helps people abstain from smoking, but how this works is unknown): Bupropion sustained-release (SR) tablets: Available by prescription. Nicotinic receptor partial agonist (simulates the effect of nicotine in your brain):  Varenicline tartrate tablets: Available by prescription. Ask your caregiver for advice about which medicines to use and how to use them. Carefully read the information on the package. Everyone who is trying to quit may benefit from using a medicine. If you are pregnant or trying to become pregnant, nursing an infant, you are under age 25, or you smoke fewer than 10 cigarettes per day, talk to your caregiver before taking any nicotine replacement medicines. You should stop using a nicotine replacement product and call your caregiver if you experience nausea, dizziness, weakness, vomiting, fast or irregular heartbeat, mouth problems with the lozenge or gum, or redness or swelling of the skin around the patch that does not go away. Do not use any other product containing nicotine while using a nicotine replacement product. Talk to your caregiver before using these products if you have diabetes, heart disease, asthma, stomach ulcers, you had a recent heart attack, you have high blood pressure that is not controlled with medicine, a history of irregular heartbeat, or you have been prescribed medicine to help you quit smoking. 5. BE PREPARED FOR RELAPSE OR DIFFICULT SITUATIONS  Most relapses occur within the first 3 months after quitting. Do not be discouraged if you start smoking again.  Remember, most people try several times before they finally quit. You may have symptoms of withdrawal because your body is used to nicotine. You may crave cigarettes, be irritable, feel very hungry, cough often, get headaches, or have difficulty concentrating. The withdrawal symptoms are only temporary. They are strongest when you first quit, but they will go away within 10 to 14 days. Here are some difficult situations to watch for:  Alcohol. Avoid drinking alcohol. Drinking lowers your chances of successfully quitting. Caffeine. Try to reduce the amount of caffeine you consume. It also lowers your chances of successfully quitting. Other smokers. Being around smoking can make you want to smoke. Avoid smokers. Weight gain. Many smokers will gain weight when they quit, usually less than 10 pounds. Eat a healthy diet and stay active. Do not let weight gain distract you from your main goal, quitting smoking. Some medicines that help you quit smoking may also help delay weight gain. You can always lose the weight gained after you quit. Bad mood or depression. There are a lot of ways to improve your mood other than smoking. If you are having problems with any of these situations, talk to your caregiver. SPECIAL SITUATIONS AND CONDITIONS  Studies suggest that everyone can quit smoking. Your situation or condition can give you a special reason to quit. Pregnant women/new mothers: By quitting, you protect your baby's health and your own. Hospitalized patients: By quitting, you reduce health problems and help healing. Heart attack patients: By quitting, you reduce your risk of a second heart attack. Lung, head, and neck cancer patients: By quitting, you reduce your chance of a second cancer. Parents of children and adolescents: By quitting, you protect your children from illnesses caused by secondhand smoke. QUESTIONS TO THINK ABOUT  Think about the following questions before you try to stop smoking.  You may want to talk about your answers with your caregiver. Why do you want to quit? If you tried to quit in the past, what helped and what did not? What will be the most difficult situations for you after you quit? How will you plan to handle them? Who can help you through the tough times? Your family? Friends? Caregiver? What pleasures do you get from smoking? What ways can you still get pleasure if you quit? Here are some questions to ask your caregiver: How can you help me to be successful at quitting? What medicine do you think would be best for me and how should I take it? What should I do if I need more help? What is smoking withdrawal like? How can I get information on withdrawal?  Quitting takes hard work and a lot of effort, but you can quit smoking. FOR MORE INFORMATION   Smokefree. gov ([employer].se) provides free, accurate, evidence-based information and professional assistance to help support the immediate and long-term needs of people trying to quit smoking. Document Released: 12/12/2002 Document Revised: 12/06/2012 Document Reviewed: 10/04/2010  CARI CASTREJON [facility] Patient Information ©2012 Clyde Turk. Keeping Home a [facility]       As we get older, changes in balance, gait, strength, vision, hearing, and cognition make even the most youthful senior more prone to accidents. Falls are one of the leading health risks for older people. This increased risk of falling is related to:   Aging process (eg, decreased muscle strength, slowed reflexes)   Higher incidence of chronic health problems (eg, arthritis, diabetes) that may limit mobility, agility or sensory awareness   Side effects of medicine (eg, dizziness, blurred vision)especially medicines like prescription pain medicines and drugs used to treat mental health conditions   Depending on the brittleness of your bones, the consequences of a fall can be serious and long lasting.    Home Life   Research by the Association of Aging [facility]) shows that some home accidents among older adults can be prevented by making simple lifestyle changes and basic modifications and repairs to the home environment. Here are some lifestyle changes that experts recommend:   Have your hearing and vision checked regularly. Be sure to wear prescription glasses that are right for you. Speak to your doctor or pharmacist about the possible side effects of your medicines. A number of medicines can cause dizziness. If you have problems with sleep, talk to your doctor. Limit your intake of alcohol. If necessary, use a cane or walker to help maintain your balance. Wear supportive, rubber-soled shoes, even at home. If you live in a region that gets wintry weather, you may want to put special cleats on your shoes to prevent you from slipping on the snow and ice. Exercise regularly to help maintain muscle tone, agility, and balance. Always hold the banister when going up or down stairs. Also, use  bars when getting in or out of the bath or shower, or using the toilet. To avoid dizziness, get up slowly from a lying down position. Sit up first, dangling your legs for a minute or two before rising to a standing position. Overall Home Safety Check   According to the Consumer Product Safety Commision's \"Older Consumer Home Safety Checklist,\" it is important to check for potential hazards in each room. And remember, proper lighting is an essential factor in home safety. If you cannot see clearly, you are more likely to fall. Important questions to ask yourself include:   Are lamp, electric, extension, and telephone cords placed out of the flow of traffic and maintained in good condition? Have frayed cords been replaced? Are all small rugs and runners slip resistant? If not, you can secure them to the floor with a special double-sided carpet tape. Are smoke detectors properly locatedone on every floor of your home and one outside of every sleeping area? Are they in good working order?  Are batteries replaced at least once a year? Do you have a well-maintained carbon monoxide detector outside every sleeping are in your home? Does your furniture layout leave plenty of space to maneuver between and around chairs, tables, beds, and sofas? Are hallways, stairs and passages between rooms well lit? Can you reach a lamp without getting out of bed? Are floor surfaces well maintained? Shag rugs, high-pile carpeting, tile floors, and polished wood floors can be particularly slippery. Stairs should always have handrails and be carpeted or fitted with a non-skid tread. Is your telephone easily reachable. Is the cord safely tucked away? Room by Room   According to the Association of Aging, bathrooms and meka are the two most potentially hazardous rooms in your home. In the Kitchen    Be sure your stove is in proper working order and always make sure burners and the oven are off before you go out or go to sleep. Keep pots on the back burners, turn handles away from the front of the stove, and keep stove clean and free of grease build-up. Kitchen ventilation systems and range exhausts should be working properly. Keep flammable objects such as towels and pot holders away from the cooking area except when in use. Make sure kitchen curtains are tied back. Move cords and appliances away from the sink and hot surfaces. If extension cords are needed, install wiring guides so they do not hang over the sink, range, or working areas. Look for coffee pots, kettles and toaster ovens with automatic shut-offs. Keep a mop handy in the kitchen so you can wipe up spills instantly. You should also have a small fire extinguisher. Arrange your kitchen with frequently used items on lower shelves to avoid the need to stand on a stepstool to reach them. Make sure countertops are well-lit to avoid injuries while cutting and preparing food.     In the Bathroom    Use a non-slip mat or decals in the tub and shower, since wet, soapy tile or porcelain surfaces are extremely slippery. Make sure bathroom rugs are non-skid or tape them firmly to the floor. Bathtubs should have at least one, preferably two, grab bars, firmly attached to structural supports in the wall. (Do not use built-in soap holders or glass shower doors as grab bars.)    Tub seats fitted with non-slip material on the legs allow you to wash sitting down. For people with limited mobility, bathtub transfer benches allow you to slide safely into the tub. Raised toilet seats and toilet safety rails are helpful for those with knee or hip problems. In the [facility]    Make sure you use a nightlight and that the area around your bed is clear of potential obstacles. Be careful with electric blankets and never go to sleep with a heating pad, which can cause serious burns even if on a low setting. Use fire-resistant mattress covers and pillows, and NEVER smoke in bed. Keep a phone next to the bed that is programmed to dial 911 at the push of a button. If you have a chronic condition, you may want to sign on with an automatic call-in service. Typically the system includes a small pendant that connects directly to an emergency medical voice-response system. You should also make arrangements to stay in contact with someonefriend, neighbor, family memberon a regular schedule. Fire Prevention   According to the [employer] (Smoke Alarms for Every) [address] [place] [address], senior citizens are one of the two highest risk groups for death and serious injuries due to residential fires. When cooking, wear short-sleeved items, never a bulky long-sleeved robe. The [facility]'s Safety Checklist for Older Consumers emphasizes the importance of checking basements, garages, workshops and storage areas for fire hazards, such as volatile liquids, piles of old rags or clothing and overloaded circuits.     Never smoke in bed or when lying down on a couch or recliner

## 2022-08-26 NOTE — TELEPHONE ENCOUNTER
Had AWV with patient today and his BP was elevated:  176/98 R  166/92 L    Patient denied any HTN symptoms. He did mention that PCP had said he was going to increase his doxazosin to 2mg. Pt states his pharmacy did not have a rx for the increase. Patient is scheduled to see PCP 8/29/22. Advised patient to check his BP at home each day over this weekend and bring his readings with him to his visit on Monday. Patient voiced understanding.

## 2022-08-26 NOTE — PROGRESS NOTES
Medicare Annual Wellness Visit    Sarika Looney is here for Medicare AWV    Assessment & Plan   Initial Medicare annual wellness visit    Recommendations for Preventive Services Due: see orders and patient instructions/AVS.  Recommended screening schedule for the next 5-10 years is provided to the patient in written form: see Patient Instructions/AVS.     No follow-ups on file. Subjective       Patient's complete Health Risk Assessment and screening values have been reviewed and are found in Flowsheets. The following problems were reviewed today and where indicated follow up appointments were made and/or referrals ordered.     Positive Risk Factor Screenings with Interventions:       Tobacco Use:  Tobacco Use: High Risk    Smoking Tobacco Use: Every Day    Smokeless Tobacco Use: Never     E-cigarette/Vaping       Questions Responses    E-cigarette/Vaping Use Never User    Start Date     Passive Exposure     Quit Date     Counseling Given     Comments           Substance Use - Tobacco Interventions:  tobacco cessation tips and resources provided     Drug Use Screening:    DAST-10 Score Interpretation:  1-2: Low level - Monitor, re-assess at a later date; 3-5: Moderate level - Further Investigation; 6-8: Substantial level - Intensive Assessment; 9-10: Severe level - Intensive Assessment  Substance Use - Drug Use Interventions:  patient is not ready to change his/her recreational drug use behavior at this time, patient states he smokes marijuana one time per week       General Health and ACP:  General  In general, how would you say your health is?: Good  In the past 7 days, have you experienced any of the following: New or Increased Pain, New or Increased Fatigue, Loneliness, Social Isolation, Stress or Anger?: No  Do you get the social and emotional support that you need?: Yes  Do you have a Living Will?: (!) No    Advance Directives       Power of  Living Will ACP-Advance Directive ACP-Power of [employer] Not on File Not on File Not on File Not on File          General Health Risk Interventions:  No Living Will: Advance Care Planning addressed with patient today    Health Habits/Nutrition:  Physical Activity: Inactive    Days of Exercise per Week: 0 days    Minutes of Exercise per Session: 0 min     Have you lost any weight without trying in the past 3 months?: No  Body mass index: (!) 31.68  Have you seen the dentist within the past year?: (!) No (has dentures, but does not wear them)  Health Habits/Nutrition Interventions:  Inadequate physical activity:  educational materials provided to promote increased physical activity  Nutritional issues:  educational materials for healthy, well-balanced diet provided, educational materials to promote weight loss provided  Dental exam overdue:  patient encouraged to make appointment with his/her dentist-patient states he has dentures, but does not wear them. Hearing/Vision:  Do you or your family notice any trouble with your hearing that hasn't been managed with hearing aids?: (!) Yes (has hearing aids, states they don't help so he doesn't wear them)  Do you have difficulty driving, watching TV, or doing any of your daily activities because of your eyesight?: No  Have you had an eye exam within the past year?: (!) No  No results found. Hearing/Vision Interventions:  Hearing concerns:  patient declines any further evaluation/treatment for hearing issues, patient states he has hearing aids, states they do not help so he doesn't wear them. Vision concerns:  patient encouraged to make appointment with his/her eye specialist            Objective   Vitals:    08/26/22 1155 08/26/22 1213   BP: (!) 176/98 (!) 166/92   Site: Right Upper Arm Left Upper Arm   Position: Sitting Sitting   Cuff Size: Medium Adult Medium Adult   Pulse: 73    SpO2: 96%    Weight: 224 lb (101.6 kg)    Height: 5' 10.5\" (1.791 m)       Body mass index is 31.69 kg/m².        **Patient is scheduled to see PCP 8/29/22. Advised patient to check his BP and record the readings and bring into his appointment on Monday. Patient denied any HTN symptoms. Patient states he smoked a cigarette and drank a Monster drink prior to AWV. Allergies   Allergen Reactions    Codeine     Vicodin [Hydrocodone-Acetaminophen] Itching     Prior to Visit Medications    Medication Sig Taking? Authorizing Provider   SYMBICORT 160-4.5 MCG/ACT AERO INHALE TWO (2) PUFFS BY MOUTH INTO THE LUNGS TWICE DAILY Yes Tj Redd MD   ipratropium (ATROVENT) 0.03 % nasal spray USE 2 SPRAYS IN EACH NOSTRIL TWICE DAILY Yes Tj Redd MD   rosuvastatin (CRESTOR) 5 MG tablet Take 1 tablet by mouth Twice a Week Yes Tj Redd MD   doxazosin (CARDURA) 1 MG tablet Take 1 tablet by mouth daily Yes Tj Redd MD   clopidogrel (PLAVIX) 75 MG tablet TAKE 1 TABLET BY MOUTH DAILY Yes Tj Redd MD   Handicap Placard MISC by Does not apply route Please dispense one placard, duration for 5 years. Yes Tj Redd MD   UNABLE TO FIND Patient taking Native Liposomal CBD spray 300mg per spray (5 mg per serving) for sleep Yes Historical Provider, MD Damian (Including outside providers/suppliers regularly involved in providing care):   Patient Care Team:  Tj Redd MD as PCP - General (Internal Medicine)  Tj Redd MD as PCP - [facility] Empaneled Provider     Reviewed and updated this visit:  Tobacco  Allergies  Meds  Med Hx  Surg Hx  Soc Hx  Fam Hx            I, Binu Benavidez LPN, 4/45/4142, performed the documented evaluation under the direct supervision of the attending physician.

## 2022-08-29 ENCOUNTER — OFFICE VISIT (OUTPATIENT)
Dept: INTERNAL MEDICINE CLINIC | Age: 67
End: 2022-08-29
Payer: MEDICARE

## 2022-08-29 VITALS
TEMPERATURE: 97.1 F | HEART RATE: 72 BPM | DIASTOLIC BLOOD PRESSURE: 78 MMHG | OXYGEN SATURATION: 97 % | SYSTOLIC BLOOD PRESSURE: 152 MMHG | BODY MASS INDEX: 31.74 KG/M2 | WEIGHT: 224.4 LBS | RESPIRATION RATE: 16 BRPM

## 2022-08-29 DIAGNOSIS — J44.9 CHRONIC OBSTRUCTIVE PULMONARY DISEASE, UNSPECIFIED COPD TYPE (HCC): Primary | ICD-10-CM

## 2022-08-29 DIAGNOSIS — E78.2 MIXED HYPERLIPIDEMIA: ICD-10-CM

## 2022-08-29 DIAGNOSIS — Z72.0 TOBACCO ABUSE: ICD-10-CM

## 2022-08-29 DIAGNOSIS — R97.20 ELEVATED PSA: ICD-10-CM

## 2022-08-29 DIAGNOSIS — I73.9 PAD (PERIPHERAL ARTERY DISEASE) (HCC): ICD-10-CM

## 2022-08-29 DIAGNOSIS — R60.0 LEG EDEMA, LEFT: ICD-10-CM

## 2022-08-29 DIAGNOSIS — F07.81 POST CONCUSSION SYNDROME: ICD-10-CM

## 2022-08-29 DIAGNOSIS — R09.81 SINUS CONGESTION: ICD-10-CM

## 2022-08-29 DIAGNOSIS — I10 PRIMARY HYPERTENSION: ICD-10-CM

## 2022-08-29 PROCEDURE — 99214 OFFICE O/P EST MOD 30 MIN: CPT | Performed by: INTERNAL MEDICINE

## 2022-08-29 RX ORDER — BUDESONIDE AND FORMOTEROL FUMARATE DIHYDRATE 160; 4.5 UG/1; UG/1
AEROSOL RESPIRATORY (INHALATION)
Qty: 10.2 G | Refills: 3 | Status: SHIPPED | OUTPATIENT
Start: 2022-08-29

## 2022-08-29 RX ORDER — IPRATROPIUM BROMIDE 21 UG/1
SPRAY, METERED NASAL
Qty: 30 ML | Refills: 10 | Status: SHIPPED | OUTPATIENT
Start: 2022-08-29

## 2022-08-29 RX ORDER — DOXAZOSIN 2 MG/1
2 TABLET ORAL DAILY
Qty: 30 TABLET | Refills: 5 | Status: SHIPPED | OUTPATIENT
Start: 2022-08-29

## 2022-08-29 RX ORDER — ROSUVASTATIN CALCIUM 5 MG/1
5 TABLET, COATED ORAL
Qty: 30 TABLET | Refills: 5 | Status: SHIPPED | OUTPATIENT
Start: 2022-08-29

## 2022-08-29 ASSESSMENT — PATIENT HEALTH QUESTIONNAIRE - PHQ9
SUM OF ALL RESPONSES TO PHQ9 QUESTIONS 1 & 2: 0
SUM OF ALL RESPONSES TO PHQ QUESTIONS 1-9: 0
1. LITTLE INTEREST OR PLEASURE IN DOING THINGS: 0
SUM OF ALL RESPONSES TO PHQ QUESTIONS 1-9: 0
2. FEELING DOWN, DEPRESSED OR HOPELESS: 0
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
## 2022-09-01 ENCOUNTER — TELEPHONE (OUTPATIENT)
Dept: INTERNAL MEDICINE CLINIC | Age: 67
End: 2022-09-01

## 2022-09-22 DIAGNOSIS — I73.9 PAD (PERIPHERAL ARTERY DISEASE) (HCC): ICD-10-CM

## 2022-09-23 RX ORDER — CLOPIDOGREL BISULFATE 75 MG/1
75 TABLET ORAL DAILY
Qty: 30 TABLET | Refills: 10 | Status: SHIPPED | OUTPATIENT
Start: 2022-09-23

## 2022-09-25 DIAGNOSIS — I73.9 PAD (PERIPHERAL ARTERY DISEASE) (HCC): ICD-10-CM

## 2022-09-26 RX ORDER — CLOPIDOGREL BISULFATE 75 MG/1
75 TABLET ORAL DAILY
Qty: 30 TABLET | Refills: 10 | OUTPATIENT
Start: 2022-09-26

## 2022-11-09 ENCOUNTER — NURSE ONLY (OUTPATIENT)
Dept: INTERNAL MEDICINE CLINIC | Age: 67
End: 2022-11-09
Payer: MEDICARE

## 2022-11-09 DIAGNOSIS — E78.2 MIXED HYPERLIPIDEMIA: ICD-10-CM

## 2022-11-09 DIAGNOSIS — I10 PRIMARY HYPERTENSION: ICD-10-CM

## 2022-11-09 LAB
A/G RATIO: 1.7 (ref 1.1–2.2)
ALBUMIN SERPL-MCNC: 4.3 G/DL (ref 3.4–5)
ALP BLD-CCNC: 72 U/L (ref 40–129)
ALT SERPL-CCNC: 16 U/L (ref 10–40)
ANION GAP SERPL CALCULATED.3IONS-SCNC: 13 MMOL/L (ref 3–16)
AST SERPL-CCNC: 17 U/L (ref 15–37)
BILIRUB SERPL-MCNC: 0.5 MG/DL (ref 0–1)
BUN BLDV-MCNC: 8 MG/DL (ref 7–20)
CALCIUM SERPL-MCNC: 9.5 MG/DL (ref 8.3–10.6)
CHLORIDE BLD-SCNC: 106 MMOL/L (ref 99–110)
CHOLESTEROL, FASTING: 194 MG/DL (ref 0–199)
CO2: 24 MMOL/L (ref 21–32)
CREAT SERPL-MCNC: 1.2 MG/DL (ref 0.8–1.3)
GFR SERPL CREATININE-BSD FRML MDRD: >60 ML/MIN/{1.73_M2}
GLUCOSE BLD-MCNC: 83 MG/DL (ref 70–99)
HDLC SERPL-MCNC: 39 MG/DL (ref 40–60)
LDL CHOLESTEROL CALCULATED: 132 MG/DL
POTASSIUM SERPL-SCNC: 4.9 MMOL/L (ref 3.5–5.1)
SODIUM BLD-SCNC: 143 MMOL/L (ref 136–145)
TOTAL PROTEIN: 6.8 G/DL (ref 6.4–8.2)
TRIGLYCERIDE, FASTING: 117 MG/DL (ref 0–150)
VLDLC SERPL CALC-MCNC: 23 MG/DL

## 2022-11-09 PROCEDURE — 36415 COLL VENOUS BLD VENIPUNCTURE: CPT | Performed by: INTERNAL MEDICINE

## 2022-12-07 ENCOUNTER — OFFICE VISIT (OUTPATIENT)
Dept: INTERNAL MEDICINE CLINIC | Age: 67
End: 2022-12-07

## 2022-12-07 VITALS
WEIGHT: 231 LBS | HEART RATE: 68 BPM | TEMPERATURE: 97 F | OXYGEN SATURATION: 94 % | BODY MASS INDEX: 32.68 KG/M2 | SYSTOLIC BLOOD PRESSURE: 162 MMHG | DIASTOLIC BLOOD PRESSURE: 96 MMHG | RESPIRATION RATE: 16 BRPM

## 2022-12-07 DIAGNOSIS — Z72.0 TOBACCO ABUSE: ICD-10-CM

## 2022-12-07 DIAGNOSIS — E78.2 MIXED HYPERLIPIDEMIA: ICD-10-CM

## 2022-12-07 DIAGNOSIS — I73.9 PAD (PERIPHERAL ARTERY DISEASE) (HCC): ICD-10-CM

## 2022-12-07 DIAGNOSIS — Z87.891 PERSONAL HISTORY OF TOBACCO USE: ICD-10-CM

## 2022-12-07 DIAGNOSIS — I10 PRIMARY HYPERTENSION: ICD-10-CM

## 2022-12-07 DIAGNOSIS — R97.20 ELEVATED PSA: ICD-10-CM

## 2022-12-07 DIAGNOSIS — R60.0 LEG EDEMA, LEFT: ICD-10-CM

## 2022-12-07 DIAGNOSIS — Z00.00 HEALTHCARE MAINTENANCE: ICD-10-CM

## 2022-12-07 DIAGNOSIS — J44.9 CHRONIC OBSTRUCTIVE PULMONARY DISEASE, UNSPECIFIED COPD TYPE (HCC): Primary | ICD-10-CM

## 2022-12-07 RX ORDER — ROSUVASTATIN CALCIUM 5 MG/1
5 TABLET, COATED ORAL DAILY
Qty: 30 TABLET | Refills: 5 | Status: SHIPPED | OUTPATIENT
Start: 2022-12-07

## 2022-12-07 RX ORDER — BUDESONIDE AND FORMOTEROL FUMARATE DIHYDRATE 160; 4.5 UG/1; UG/1
AEROSOL RESPIRATORY (INHALATION)
Qty: 10.2 G | Refills: 3 | Status: SHIPPED | OUTPATIENT
Start: 2022-12-07

## 2022-12-07 RX ORDER — DOXAZOSIN MESYLATE 4 MG/1
4 TABLET ORAL DAILY
Qty: 30 TABLET | Refills: 5 | Status: SHIPPED | OUTPATIENT
Start: 2022-12-07

## 2022-12-07 ASSESSMENT — PATIENT HEALTH QUESTIONNAIRE - PHQ9
SUM OF ALL RESPONSES TO PHQ QUESTIONS 1-9: 0
SUM OF ALL RESPONSES TO PHQ QUESTIONS 1-9: 0
1. LITTLE INTEREST OR PLEASURE IN DOING THINGS: 0
2. FEELING DOWN, DEPRESSED OR HOPELESS: 0
SUM OF ALL RESPONSES TO PHQ QUESTIONS 1-9: 0
SUM OF ALL RESPONSES TO PHQ9 QUESTIONS 1 & 2: 0
SUM OF ALL RESPONSES TO PHQ QUESTIONS 1-9: 0
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
## 2023-01-06 PROBLEM — Z00.00 HEALTHCARE MAINTENANCE: Status: RESOLVED | Noted: 2022-12-07 | Resolved: 2023-01-06

## 2023-03-21 ENCOUNTER — OFFICE VISIT (OUTPATIENT)
Dept: INTERNAL MEDICINE CLINIC | Age: 68
End: 2023-03-21
Payer: MEDICARE

## 2023-03-21 ENCOUNTER — TELEPHONE (OUTPATIENT)
Dept: INTERNAL MEDICINE CLINIC | Age: 68
End: 2023-03-21

## 2023-03-21 VITALS
DIASTOLIC BLOOD PRESSURE: 98 MMHG | BODY MASS INDEX: 33.53 KG/M2 | OXYGEN SATURATION: 96 % | TEMPERATURE: 97.5 F | HEART RATE: 76 BPM | SYSTOLIC BLOOD PRESSURE: 156 MMHG | WEIGHT: 237 LBS | RESPIRATION RATE: 16 BRPM

## 2023-03-21 DIAGNOSIS — Z12.11 SCREEN FOR COLON CANCER: ICD-10-CM

## 2023-03-21 DIAGNOSIS — E78.2 MIXED HYPERLIPIDEMIA: ICD-10-CM

## 2023-03-21 DIAGNOSIS — Z72.0 TOBACCO ABUSE: ICD-10-CM

## 2023-03-21 DIAGNOSIS — I73.9 PAD (PERIPHERAL ARTERY DISEASE) (HCC): ICD-10-CM

## 2023-03-21 DIAGNOSIS — J44.9 CHRONIC OBSTRUCTIVE PULMONARY DISEASE, UNSPECIFIED COPD TYPE (HCC): ICD-10-CM

## 2023-03-21 DIAGNOSIS — R09.81 SINUS CONGESTION: ICD-10-CM

## 2023-03-21 DIAGNOSIS — R97.20 ELEVATED PSA: ICD-10-CM

## 2023-03-21 DIAGNOSIS — I10 PRIMARY HYPERTENSION: Primary | ICD-10-CM

## 2023-03-21 PROCEDURE — 1123F ACP DISCUSS/DSCN MKR DOCD: CPT | Performed by: INTERNAL MEDICINE

## 2023-03-21 PROCEDURE — 99214 OFFICE O/P EST MOD 30 MIN: CPT | Performed by: INTERNAL MEDICINE

## 2023-03-21 PROCEDURE — 3080F DIAST BP >= 90 MM HG: CPT | Performed by: INTERNAL MEDICINE

## 2023-03-21 PROCEDURE — 3077F SYST BP >= 140 MM HG: CPT | Performed by: INTERNAL MEDICINE

## 2023-03-21 RX ORDER — BUDESONIDE AND FORMOTEROL FUMARATE DIHYDRATE 160; 4.5 UG/1; UG/1
AEROSOL RESPIRATORY (INHALATION)
Qty: 10.2 G | Refills: 3 | Status: SHIPPED | OUTPATIENT
Start: 2023-03-21

## 2023-03-21 RX ORDER — DOXAZOSIN MESYLATE 4 MG/1
4 TABLET ORAL 2 TIMES DAILY
Qty: 60 TABLET | Refills: 3 | Status: SHIPPED | OUTPATIENT
Start: 2023-03-21

## 2023-03-21 RX ORDER — IPRATROPIUM BROMIDE 21 UG/1
SPRAY, METERED NASAL
Qty: 30 ML | Refills: 5 | Status: SHIPPED | OUTPATIENT
Start: 2023-03-21

## 2023-03-21 RX ORDER — LISINOPRIL 10 MG/1
10 TABLET ORAL DAILY
Qty: 90 TABLET | Refills: 1 | Status: SHIPPED | OUTPATIENT
Start: 2023-03-21

## 2023-03-21 RX ORDER — CLOPIDOGREL BISULFATE 75 MG/1
75 TABLET ORAL DAILY
Qty: 30 TABLET | Refills: 5 | Status: SHIPPED | OUTPATIENT
Start: 2023-03-21

## 2023-03-21 RX ORDER — DOXAZOSIN MESYLATE 4 MG/1
4 TABLET ORAL 2 TIMES DAILY
COMMUNITY
End: 2023-03-21 | Stop reason: SDUPTHER

## 2023-03-21 ASSESSMENT — ENCOUNTER SYMPTOMS
RESPIRATORY NEGATIVE: 1
NAUSEA: 0
VOMITING: 0
EYES NEGATIVE: 1
GASTROINTESTINAL NEGATIVE: 1
DIARRHEA: 0
CONSTIPATION: 0
SINUS PRESSURE: 1
COUGH: 0
SHORTNESS OF BREATH: 0

## 2023-03-21 NOTE — TELEPHONE ENCOUNTER
Patient was advised that Dr Geeta Jo called in lisinopril to take due to kayla blood pressure and patient is refusing this medication. Dr would like patient to take doxazosin 4 mg Bid. Patient agreed. Will need new rx. Will send request to Dr Geeta Jo.

## 2023-03-21 NOTE — TELEPHONE ENCOUNTER
Referral to Dr Isabel Souza faxed successfully and CT order faxed to Central scheduling to schedule patient.  Faxed successfully
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
## 2023-04-02 LAB — NONINV COLON CA DNA+OCC BLD SCRN STL QL: NEGATIVE

## 2023-05-11 RX ORDER — ROSUVASTATIN CALCIUM 5 MG/1
5 TABLET, COATED ORAL DAILY
Qty: 30 TABLET | Refills: 5 | Status: SHIPPED | OUTPATIENT
Start: 2023-05-11

## 2023-06-21 ENCOUNTER — OFFICE VISIT (OUTPATIENT)
Dept: INTERNAL MEDICINE CLINIC | Age: 68
End: 2023-06-21
Payer: MEDICARE

## 2023-06-21 VITALS
WEIGHT: 238.4 LBS | RESPIRATION RATE: 16 BRPM | OXYGEN SATURATION: 96 % | BODY MASS INDEX: 33.72 KG/M2 | DIASTOLIC BLOOD PRESSURE: 92 MMHG | SYSTOLIC BLOOD PRESSURE: 146 MMHG | HEART RATE: 72 BPM | TEMPERATURE: 97.9 F

## 2023-06-21 DIAGNOSIS — Z72.0 TOBACCO ABUSE: ICD-10-CM

## 2023-06-21 DIAGNOSIS — E78.2 MIXED HYPERLIPIDEMIA: ICD-10-CM

## 2023-06-21 DIAGNOSIS — R97.20 ELEVATED PSA: ICD-10-CM

## 2023-06-21 DIAGNOSIS — R60.0 LEG EDEMA, LEFT: ICD-10-CM

## 2023-06-21 DIAGNOSIS — I10 PRIMARY HYPERTENSION: ICD-10-CM

## 2023-06-21 DIAGNOSIS — F07.81 POST CONCUSSION SYNDROME: ICD-10-CM

## 2023-06-21 DIAGNOSIS — J44.9 CHRONIC OBSTRUCTIVE PULMONARY DISEASE, UNSPECIFIED COPD TYPE (HCC): Primary | ICD-10-CM

## 2023-06-21 DIAGNOSIS — I73.9 PAD (PERIPHERAL ARTERY DISEASE) (HCC): ICD-10-CM

## 2023-06-21 PROCEDURE — 99213 OFFICE O/P EST LOW 20 MIN: CPT | Performed by: INTERNAL MEDICINE

## 2023-06-21 PROCEDURE — 3080F DIAST BP >= 90 MM HG: CPT | Performed by: INTERNAL MEDICINE

## 2023-06-21 PROCEDURE — 3077F SYST BP >= 140 MM HG: CPT | Performed by: INTERNAL MEDICINE

## 2023-06-21 PROCEDURE — 1123F ACP DISCUSS/DSCN MKR DOCD: CPT | Performed by: INTERNAL MEDICINE

## 2023-06-21 RX ORDER — METOPROLOL SUCCINATE 25 MG/1
25 TABLET, EXTENDED RELEASE ORAL DAILY
Qty: 30 TABLET | Refills: 5 | Status: SHIPPED | OUTPATIENT
Start: 2023-06-21

## 2023-06-21 ASSESSMENT — PATIENT HEALTH QUESTIONNAIRE - PHQ9
1. LITTLE INTEREST OR PLEASURE IN DOING THINGS: 0
2. FEELING DOWN, DEPRESSED OR HOPELESS: 0
SUM OF ALL RESPONSES TO PHQ9 QUESTIONS 1 & 2: 0
SUM OF ALL RESPONSES TO PHQ QUESTIONS 1-9: 0
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
## 2023-08-29 ENCOUNTER — TELEPHONE (OUTPATIENT)
Dept: PHARMACY | Facility: CLINIC | Age: 68
End: 2023-08-29

## 2023-08-29 NOTE — TELEPHONE ENCOUNTER
[facility] CLINICAL PHARMACY: ADHERENCE REVIEW  Identified care gap per [facility]: fills at [facility] : ACE/ARB adherence    Patient also appears to be prescribed: Statin due 23 5RF (2-3x week)    ASSESSMENT    ACE/ARB ADHERENCE    Insurance Records claims through 23 (Prior Year [address] = not reported; YTD [address] = 82%; Potential Fail Date: 23): Lisinopril 10mg last filled on 23 for 30 day supply. Next refill due: 23    Prescribed si tablet/capsule daily  Patient not taking: Reported on 2023    Portal: filled 23 THEN REVERSED    Per 23 OV:   \"Does not want to take lisinopril and he has not taken it. BP still elevated\"    BP Readings from Last 3 Encounters:   23 (!) 146/92   23 (!) 156/98   22 (!) 162/96     CrCl cannot be calculated (Unknown ideal weight.). Lab Results   Component Value Date    CREATININE 1.2 2023     Lab Results   Component Value Date    K 4.9 2023         The following are interventions that have been identified:   Patient overdue refilling lisinopril and active on home medication list.     Attempting to reach patient to review. Left message asking for return call. Know non adherence with lisinopril and statin    Last Visit: 23  Next Visit: 23    Astrid Rodriguez CP.    [facility] free: [phone]     For Pharmacy Admin Tracking Only    Program: Maggie in place:  No  Gap Closed?: No   Time Spent (min): 15

## 2023-09-01 ENCOUNTER — TELEMEDICINE (OUTPATIENT)
Dept: INTERNAL MEDICINE CLINIC | Age: 68
End: 2023-09-01
Payer: MEDICARE

## 2023-09-01 DIAGNOSIS — Z00.00 MEDICARE ANNUAL WELLNESS VISIT, SUBSEQUENT: Primary | ICD-10-CM

## 2023-09-01 PROCEDURE — 1123F ACP DISCUSS/DSCN MKR DOCD: CPT | Performed by: INTERNAL MEDICINE

## 2023-09-01 PROCEDURE — G0439 PPPS, SUBSEQ VISIT: HCPCS | Performed by: INTERNAL MEDICINE

## 2023-09-01 SDOH — ECONOMIC STABILITY: FOOD INSECURITY: WITHIN THE PAST 12 MONTHS, YOU WORRIED THAT YOUR FOOD WOULD RUN OUT BEFORE YOU GOT MONEY TO BUY MORE.: NEVER TRUE

## 2023-09-01 SDOH — ECONOMIC STABILITY: HOUSING INSECURITY
IN THE LAST 12 MONTHS, WAS THERE A TIME WHEN YOU DID NOT HAVE A STEADY PLACE TO SLEEP OR SLEPT IN A SHELTER (INCLUDING NOW)?: NO

## 2023-09-01 SDOH — ECONOMIC STABILITY: FOOD INSECURITY: WITHIN THE PAST 12 MONTHS, THE FOOD YOU BOUGHT JUST DIDN'T LAST AND YOU DIDN'T HAVE MONEY TO GET MORE.: NEVER TRUE

## 2023-09-01 SDOH — ECONOMIC STABILITY: INCOME INSECURITY: HOW HARD IS IT FOR YOU TO PAY FOR THE VERY BASICS LIKE FOOD, HOUSING, MEDICAL CARE, AND HEATING?: NOT HARD AT ALL

## 2023-09-01 ASSESSMENT — LIFESTYLE VARIABLES
HOW OFTEN DO YOU HAVE A DRINK CONTAINING ALCOHOL: 2-3 TIMES A WEEK
HOW MANY STANDARD DRINKS CONTAINING ALCOHOL DO YOU HAVE ON A TYPICAL DAY: 1 OR 2

## 2023-09-01 ASSESSMENT — PATIENT HEALTH QUESTIONNAIRE - PHQ9
5. POOR APPETITE OR OVEREATING: 2
10. IF YOU CHECKED OFF ANY PROBLEMS, HOW DIFFICULT HAVE THESE PROBLEMS MADE IT FOR YOU TO DO YOUR WORK, TAKE CARE OF THINGS AT HOME, OR GET ALONG WITH OTHER PEOPLE: 0
SUM OF ALL RESPONSES TO PHQ9 QUESTIONS 1 & 2: 0
SUM OF ALL RESPONSES TO PHQ QUESTIONS 1-9: 3
SUM OF ALL RESPONSES TO PHQ QUESTIONS 1-9: 3
6. FEELING BAD ABOUT YOURSELF - OR THAT YOU ARE A FAILURE OR HAVE LET YOURSELF OR YOUR FAMILY DOWN: 0
SUM OF ALL RESPONSES TO PHQ QUESTIONS 1-9: 3
4. FEELING TIRED OR HAVING LITTLE ENERGY: 1
1. LITTLE INTEREST OR PLEASURE IN DOING THINGS: 0
7. TROUBLE CONCENTRATING ON THINGS, SUCH AS READING THE NEWSPAPER OR WATCHING TELEVISION: 0
8. MOVING OR SPEAKING SO SLOWLY THAT OTHER PEOPLE COULD HAVE NOTICED. OR THE OPPOSITE, BEING SO FIGETY OR RESTLESS THAT YOU HAVE BEEN MOVING AROUND A LOT MORE THAN USUAL: 0
3. TROUBLE FALLING OR STAYING ASLEEP: 0
SUM OF ALL RESPONSES TO PHQ QUESTIONS 1-9: 3
2. FEELING DOWN, DEPRESSED OR HOPELESS: 0
9. THOUGHTS THAT YOU WOULD BE BETTER OFF DEAD, OR OF HURTING YOURSELF: 0

## 2023-09-01 NOTE — PATIENT INSTRUCTIONS
Personalized Preventive Plan for Lindsey Teague - 9/1/2023  Medicare offers a range of preventive health benefits. Some of the tests and screenings are paid in full while other may be subject to a deductible, co-insurance, and/or copay. Some of these benefits include a comprehensive review of your medical history including lifestyle, illnesses that may run in your family, and various assessments and screenings as appropriate. After reviewing your medical record and screening and assessments performed today your provider may have ordered immunizations, labs, imaging, and/or referrals for you. A list of these orders (if applicable) as well as your Preventive Care list are included within your After Visit Summary for your review. Other Preventive Recommendations:    A preventive eye exam performed by an eye specialist is recommended every 1-2 years to screen for glaucoma; cataracts, macular degeneration, and other eye disorders. A preventive dental visit is recommended every 6 months. Try to get at least 150 minutes of exercise per week or 10,000 steps per day on a pedometer . Order or download the FREE \"Exercise & Physical Activity: Your Everyday Guide\" from [employer]. Call 9-582.353.7521 or search [employer] online. You need 0956-7882 mg of calcium and 1595-3617 IU of vitamin D per day. It is possible to meet your calcium requirement with diet alone, but a vitamin D supplement is usually necessary to meet this goal.  When exposed to the sun, use a sunscreen that protects against both UVA and UVB radiation with an SPF of 30 or greater. Reapply every 2 to 3 hours or after sweating, drying off with a towel, or swimming. Always wear a seat belt when traveling in a car. Always wear a helmet when riding a bicycle or motorcycle.

## 2023-09-01 NOTE — PROGRESS NOTES
interventions or treatment     Advanced Directives:  Do you have a Living Will?: (!) No (not interested)    Intervention:  has NO advanced directive - not interested in additional information        Tobacco Use:  Tobacco Use: High Risk    Smoking Tobacco Use: Every Day    Smokeless Tobacco Use: Never    Passive Exposure: Not on file     E-cigarette/Vaping       Questions Responses    E-cigarette/Vaping Use Never User    Start Date     Passive Exposure     Quit Date     Counseling Given     Comments           Interventions:  Patient declined any further intervention or treatment                        Objective      Patient-Reported Vitals  No data recorded       Patient unble to obtained vitals d/t not having proper equipment available. Allergies   Allergen Reactions    Codeine     Vicodin [Hydrocodone-Acetaminophen] Itching     Prior to Visit Medications    Medication Sig Taking?  Authorizing Provider   metoprolol succinate (TOPROL XL) 25 MG extended release tablet Take 1 tablet by mouth daily Yes Ines Davison MD   doxazosin (CARDURA) 4 MG tablet TAKE 1 TABLET BY MOUTH IN THE MORNING AND AT BEDTIME *NEW DIRECTIONS/DOSAGE* Yes Ines Davison MD   rosuvastatin (CRESTOR) 5 MG tablet TAKE 1 TABLET BY MOUTH DAILY Yes Ines Davison MD   SYMBICORT 160-4.5 MCG/ACT AERO INHALE TWO (2) PUFFS BY MOUTH INTO THE LUNGS TWICE DAILY Yes Ines Davison MD   clopidogrel (PLAVIX) 75 MG tablet Take 1 tablet by mouth daily Yes Ines Davison MD   ipratropium (ATROVENT) 0.03 % nasal spray 2 sprays in each nostril daily Yes Ines Davison MD   lisinopril (PRINIVIL;ZESTRIL) 10 MG tablet Take 1 tablet by mouth daily  Patient not taking: Reported on 6/21/2023  Ines Davison MD       [facility] (Including outside providers/suppliers regularly involved in providing care):   Patient Care Team:  Ines Davison MD as PCP - General (Internal Medicine)  Ines Davison MD as PCP - Empaneled Provider     Reviewed and updated this visit:  Allergies  Meds

## 2023-09-22 ENCOUNTER — OFFICE VISIT (OUTPATIENT)
Dept: INTERNAL MEDICINE CLINIC | Age: 68
End: 2023-09-22
Payer: MEDICARE

## 2023-09-22 VITALS
OXYGEN SATURATION: 96 % | HEART RATE: 68 BPM | TEMPERATURE: 97.6 F | RESPIRATION RATE: 16 BRPM | HEIGHT: 70 IN | DIASTOLIC BLOOD PRESSURE: 86 MMHG | BODY MASS INDEX: 33.44 KG/M2 | WEIGHT: 233.6 LBS | SYSTOLIC BLOOD PRESSURE: 144 MMHG

## 2023-09-22 DIAGNOSIS — R97.20 ELEVATED PSA: ICD-10-CM

## 2023-09-22 DIAGNOSIS — R60.0 LEG EDEMA, LEFT: ICD-10-CM

## 2023-09-22 DIAGNOSIS — R09.81 SINUS CONGESTION: ICD-10-CM

## 2023-09-22 DIAGNOSIS — F07.81 POST CONCUSSION SYNDROME: ICD-10-CM

## 2023-09-22 DIAGNOSIS — I73.9 PAD (PERIPHERAL ARTERY DISEASE) (HCC): ICD-10-CM

## 2023-09-22 DIAGNOSIS — J44.9 CHRONIC OBSTRUCTIVE PULMONARY DISEASE, UNSPECIFIED COPD TYPE (HCC): Primary | ICD-10-CM

## 2023-09-22 DIAGNOSIS — E78.2 MIXED HYPERLIPIDEMIA: ICD-10-CM

## 2023-09-22 DIAGNOSIS — J30.9 ALLERGIC RHINITIS, UNSPECIFIED SEASONALITY, UNSPECIFIED TRIGGER: ICD-10-CM

## 2023-09-22 DIAGNOSIS — Z72.0 TOBACCO ABUSE: ICD-10-CM

## 2023-09-22 DIAGNOSIS — I10 PRIMARY HYPERTENSION: ICD-10-CM

## 2023-09-22 PROCEDURE — 3078F DIAST BP <80 MM HG: CPT | Performed by: INTERNAL MEDICINE

## 2023-09-22 PROCEDURE — 3074F SYST BP LT 130 MM HG: CPT | Performed by: INTERNAL MEDICINE

## 2023-09-22 PROCEDURE — 1123F ACP DISCUSS/DSCN MKR DOCD: CPT | Performed by: INTERNAL MEDICINE

## 2023-09-22 PROCEDURE — 99214 OFFICE O/P EST MOD 30 MIN: CPT | Performed by: INTERNAL MEDICINE

## 2023-09-22 RX ORDER — ALBUTEROL SULFATE 90 UG/1
2 AEROSOL, METERED RESPIRATORY (INHALATION) 4 TIMES DAILY PRN
Qty: 54 G | Refills: 1 | Status: SHIPPED | OUTPATIENT
Start: 2023-09-22

## 2023-09-22 RX ORDER — IPRATROPIUM BROMIDE 21 UG/1
SPRAY, METERED NASAL
Qty: 30 ML | Refills: 5 | Status: SHIPPED | OUTPATIENT
Start: 2023-09-22

## 2023-09-22 RX ORDER — ROSUVASTATIN CALCIUM 5 MG/1
5 TABLET, COATED ORAL DAILY
Qty: 30 TABLET | Refills: 5 | Status: SHIPPED | OUTPATIENT
Start: 2023-09-22

## 2023-09-22 RX ORDER — CLOPIDOGREL BISULFATE 75 MG/1
75 TABLET ORAL DAILY
Qty: 30 TABLET | Refills: 5 | Status: SHIPPED | OUTPATIENT
Start: 2023-09-22

## 2023-09-22 RX ORDER — BUDESONIDE AND FORMOTEROL FUMARATE DIHYDRATE 160; 4.5 UG/1; UG/1
AEROSOL RESPIRATORY (INHALATION)
Qty: 10.2 G | Refills: 5 | Status: SHIPPED | OUTPATIENT
Start: 2023-09-22

## 2023-09-22 RX ORDER — METOPROLOL SUCCINATE 50 MG/1
50 TABLET, EXTENDED RELEASE ORAL DAILY
Qty: 30 TABLET | Refills: 5 | Status: SHIPPED | OUTPATIENT
Start: 2023-09-22

## 2023-09-22 ASSESSMENT — PATIENT HEALTH QUESTIONNAIRE - PHQ9
SUM OF ALL RESPONSES TO PHQ9 QUESTIONS 1 & 2: 0
SUM OF ALL RESPONSES TO PHQ QUESTIONS 1-9: 0
2. FEELING DOWN, DEPRESSED OR HOPELESS: 0
1. LITTLE INTEREST OR PLEASURE IN DOING THINGS: 0
SUM OF ALL RESPONSES TO PHQ QUESTIONS 1-9: 0

## 2023-12-15 ENCOUNTER — NURSE ONLY (OUTPATIENT)
Dept: INTERNAL MEDICINE CLINIC | Age: 68
End: 2023-12-15
Payer: MEDICARE

## 2023-12-15 DIAGNOSIS — E78.2 MIXED HYPERLIPIDEMIA: ICD-10-CM

## 2023-12-15 DIAGNOSIS — J44.9 CHRONIC OBSTRUCTIVE PULMONARY DISEASE, UNSPECIFIED COPD TYPE (HCC): ICD-10-CM

## 2023-12-15 LAB
BASOPHILS # BLD: 0 K/UL (ref 0–0.2)
BASOPHILS NFR BLD: 0.3 %
DEPRECATED RDW RBC AUTO: 15.1 % (ref 12.4–15.4)
EOSINOPHIL # BLD: 0.1 K/UL (ref 0–0.6)
EOSINOPHIL NFR BLD: 1 %
HCT VFR BLD AUTO: 50.4 % (ref 40.5–52.5)
HGB BLD-MCNC: 16.6 G/DL (ref 13.5–17.5)
LYMPHOCYTES # BLD: 1.6 K/UL (ref 1–5.1)
LYMPHOCYTES NFR BLD: 22.3 %
MCH RBC QN AUTO: 31 PG (ref 26–34)
MCHC RBC AUTO-ENTMCNC: 32.9 G/DL (ref 31–36)
MCV RBC AUTO: 94.2 FL (ref 80–100)
MONOCYTES # BLD: 0.4 K/UL (ref 0–1.3)
MONOCYTES NFR BLD: 5 %
NEUTROPHILS # BLD: 5.1 K/UL (ref 1.7–7.7)
NEUTROPHILS NFR BLD: 71.4 %
PLATELET # BLD AUTO: 155 K/UL (ref 135–450)
PMV BLD AUTO: 9.9 FL (ref 5–10.5)
RBC # BLD AUTO: 5.34 M/UL (ref 4.2–5.9)
WBC # BLD AUTO: 7.2 K/UL (ref 4–11)

## 2023-12-15 PROCEDURE — 36415 COLL VENOUS BLD VENIPUNCTURE: CPT | Performed by: INTERNAL MEDICINE

## 2023-12-16 LAB
ALBUMIN SERPL-MCNC: 4.4 G/DL (ref 3.4–5)
ALBUMIN/GLOB SERPL: 1.8 {RATIO} (ref 1.1–2.2)
ALP SERPL-CCNC: 83 U/L (ref 40–129)
ALT SERPL-CCNC: 12 U/L (ref 10–40)
ANION GAP SERPL CALCULATED.3IONS-SCNC: 7 MMOL/L (ref 3–16)
AST SERPL-CCNC: 14 U/L (ref 15–37)
BILIRUB SERPL-MCNC: 0.8 MG/DL (ref 0–1)
BUN SERPL-MCNC: 11 MG/DL (ref 7–20)
CALCIUM SERPL-MCNC: 9.1 MG/DL (ref 8.3–10.6)
CHLORIDE SERPL-SCNC: 102 MMOL/L (ref 99–110)
CHOLEST SERPL-MCNC: 144 MG/DL (ref 0–199)
CO2 SERPL-SCNC: 29 MMOL/L (ref 21–32)
CREAT SERPL-MCNC: 1.1 MG/DL (ref 0.8–1.3)
GFR SERPLBLD CREATININE-BSD FMLA CKD-EPI: >60 ML/MIN/{1.73_M2}
GLUCOSE SERPL-MCNC: 92 MG/DL (ref 70–99)
HDLC SERPL-MCNC: 31 MG/DL (ref 40–60)
LDL CHOLESTEROL CALCULATED: 93 MG/DL
POTASSIUM SERPL-SCNC: 4.6 MMOL/L (ref 3.5–5.1)
PROT SERPL-MCNC: 6.9 G/DL (ref 6.4–8.2)
SODIUM SERPL-SCNC: 138 MMOL/L (ref 136–145)
TRIGL SERPL-MCNC: 101 MG/DL (ref 0–150)
VLDLC SERPL CALC-MCNC: 20 MG/DL

## 2024-01-09 ENCOUNTER — TELEPHONE (OUTPATIENT)
Dept: INTERNAL MEDICINE CLINIC | Age: 69
End: 2024-01-09

## 2024-01-09 NOTE — TELEPHONE ENCOUNTER
Pt called and states his insurance will not cover brand name Symbicort but will cover generic Symbicort or generic Advair as well. Please advise.

## 2024-01-10 RX ORDER — METOPROLOL SUCCINATE 50 MG/1
50 TABLET, EXTENDED RELEASE ORAL DAILY
Qty: 30 TABLET | Refills: 2 | Status: SHIPPED | OUTPATIENT
Start: 2024-01-10

## 2024-01-11 NOTE — TELEPHONE ENCOUNTER
Check with the pharmacy if they have  generic Symbicort or Advair and if they have let me know please

## 2024-01-12 RX ORDER — FLUTICASONE PROPIONATE 44 UG/1
2 AEROSOL, METERED RESPIRATORY (INHALATION) 2 TIMES DAILY
Qty: 1 EACH | Refills: 3 | Status: SHIPPED | OUTPATIENT
Start: 2024-01-12

## 2024-02-19 DIAGNOSIS — R09.81 SINUS CONGESTION: ICD-10-CM

## 2024-02-19 RX ORDER — IPRATROPIUM BROMIDE 21 UG/1
SPRAY, METERED NASAL
Qty: 30 ML | Refills: 5 | Status: SHIPPED | OUTPATIENT
Start: 2024-02-19

## 2024-02-19 NOTE — TELEPHONE ENCOUNTER
Medication:   Requested Prescriptions     Pending Prescriptions Disp Refills    ipratropium (ATROVENT) 0.03 % nasal spray 30 mL 5     Si sprays in each nostril daily        Last Filled:      Patient Phone Number: [phone] (home) [phone] (work)    Last appt: 2023   Next appt: 3/22/2024    Last OARRS:       3/10/2020     1:22 PM   RX Monitoring   Periodic Controlled Substance Monitoring No signs of potential drug abuse or diversion identified.        [facility] pharmacy Thank you

## 2024-03-01 NOTE — ASSESSMENT & PLAN NOTE
HCC coding opportunities     E11.51, I13.0     Chart Reviewed number of suggestions sent to Provider: 2   GR    Patients Insurance     Medicare Insurance: [employer] Advantage           Hypertension : BP elevated. Does not want any medication   Continue dozazosin 4 mg daily. But patient stopped taking doxazosin  He does not want to take medications.

## 2024-03-04 ENCOUNTER — TELEPHONE (OUTPATIENT)
Dept: INTERNAL MEDICINE CLINIC | Age: 69
End: 2024-03-04

## 2024-03-04 PROBLEM — J30.9 ALLERGIC RHINITIS: Chronic | Status: ACTIVE | Noted: 2023-09-22

## 2024-03-08 RX ORDER — ALBUTEROL SULFATE 90 UG/1
2 AEROSOL, METERED RESPIRATORY (INHALATION) 4 TIMES DAILY PRN
Qty: 18 G | Refills: 10 | Status: SHIPPED | OUTPATIENT
Start: 2024-03-08

## 2024-03-11 DIAGNOSIS — J44.9 CHRONIC OBSTRUCTIVE PULMONARY DISEASE, UNSPECIFIED COPD TYPE (HCC): ICD-10-CM

## 2024-03-12 RX ORDER — BUDESONIDE AND FORMOTEROL FUMARATE DIHYDRATE 160; 4.5 UG/1; UG/1
AEROSOL RESPIRATORY (INHALATION)
Qty: 10.2 G | Refills: 1 | Status: SHIPPED | OUTPATIENT
Start: 2024-03-12

## 2024-03-22 ENCOUNTER — OFFICE VISIT (OUTPATIENT)
Age: 69
End: 2024-03-22
Payer: MEDICAID

## 2024-03-22 VITALS
RESPIRATION RATE: 16 BRPM | TEMPERATURE: 97 F | WEIGHT: 235.8 LBS | DIASTOLIC BLOOD PRESSURE: 82 MMHG | HEART RATE: 92 BPM | BODY MASS INDEX: 33.83 KG/M2 | OXYGEN SATURATION: 93 % | SYSTOLIC BLOOD PRESSURE: 138 MMHG

## 2024-03-22 DIAGNOSIS — I10 PRIMARY HYPERTENSION: ICD-10-CM

## 2024-03-22 DIAGNOSIS — J44.9 CHRONIC OBSTRUCTIVE PULMONARY DISEASE, UNSPECIFIED COPD TYPE (HCC): ICD-10-CM

## 2024-03-22 DIAGNOSIS — R97.20 ELEVATED PSA: ICD-10-CM

## 2024-03-22 DIAGNOSIS — I73.9 PAD (PERIPHERAL ARTERY DISEASE) (HCC): ICD-10-CM

## 2024-03-22 DIAGNOSIS — E78.2 MIXED HYPERLIPIDEMIA: Primary | ICD-10-CM

## 2024-03-22 DIAGNOSIS — F07.81 POST CONCUSSION SYNDROME: ICD-10-CM

## 2024-03-22 DIAGNOSIS — J30.9 ALLERGIC RHINITIS, UNSPECIFIED SEASONALITY, UNSPECIFIED TRIGGER: Chronic | ICD-10-CM

## 2024-03-22 DIAGNOSIS — R60.0 LEG EDEMA, LEFT: ICD-10-CM

## 2024-03-22 DIAGNOSIS — Z72.0 TOBACCO ABUSE: ICD-10-CM

## 2024-03-22 PROCEDURE — 1123F ACP DISCUSS/DSCN MKR DOCD: CPT | Performed by: INTERNAL MEDICINE

## 2024-03-22 PROCEDURE — 3075F SYST BP GE 130 - 139MM HG: CPT | Performed by: INTERNAL MEDICINE

## 2024-03-22 PROCEDURE — 99214 OFFICE O/P EST MOD 30 MIN: CPT | Performed by: INTERNAL MEDICINE

## 2024-03-22 PROCEDURE — 3079F DIAST BP 80-89 MM HG: CPT | Performed by: INTERNAL MEDICINE

## 2024-03-22 RX ORDER — METOPROLOL SUCCINATE 50 MG/1
50 TABLET, EXTENDED RELEASE ORAL DAILY
Qty: 30 TABLET | Refills: 5 | Status: SHIPPED | OUTPATIENT
Start: 2024-03-22

## 2024-03-22 RX ORDER — DOXAZOSIN MESYLATE 4 MG/1
TABLET ORAL
Qty: 60 TABLET | Refills: 5 | Status: SHIPPED | OUTPATIENT
Start: 2024-03-22

## 2024-03-22 RX ORDER — CLOPIDOGREL BISULFATE 75 MG/1
75 TABLET ORAL DAILY
Qty: 30 TABLET | Refills: 5 | Status: SHIPPED | OUTPATIENT
Start: 2024-03-22

## 2024-03-22 RX ORDER — ROSUVASTATIN CALCIUM 5 MG/1
TABLET, COATED ORAL
Qty: 46 TABLET | Refills: 1 | Status: SHIPPED | OUTPATIENT
Start: 2024-03-22

## 2024-03-22 ASSESSMENT — PATIENT HEALTH QUESTIONNAIRE - PHQ9
SUM OF ALL RESPONSES TO PHQ QUESTIONS 1-9: 0
1. LITTLE INTEREST OR PLEASURE IN DOING THINGS: NOT AT ALL
SUM OF ALL RESPONSES TO PHQ QUESTIONS 1-9: 0
SUM OF ALL RESPONSES TO PHQ9 QUESTIONS 1 & 2: 0
2. FEELING DOWN, DEPRESSED OR HOPELESS: NOT AT ALL
SUM OF ALL RESPONSES TO PHQ QUESTIONS 1-9: 0
SUM OF ALL RESPONSES TO PHQ QUESTIONS 1-9: 0

## 2024-03-22 NOTE — PROGRESS NOTES
12/15/2023 10:19 AM    CREATININE 1.1 12/15/2023 10:19 AM     12/15/2023 10:19 AM    K 4.6 12/15/2023 10:19 AM    ALKPHOS 83 12/15/2023 10:19 AM    ALT 12 12/15/2023 10:19 AM    AST 14 12/15/2023 10:19 AM    GLUCOSE 92 12/15/2023 10:19 AM    GLUF 97 09/19/2017 08:20 AM     PT/INR: No results found for: \"INR\"  A1C:   Lab Results   Component Value Date    LABA1C 5.3 09/19/2017           Kalani Liu MD, 3/22/2024 , 11:58 AM

## 2024-05-09 DIAGNOSIS — R09.81 SINUS CONGESTION: ICD-10-CM

## 2024-05-10 RX ORDER — IPRATROPIUM BROMIDE 21 UG/1
SPRAY, METERED NASAL
Qty: 30 ML | Refills: 10 | Status: SHIPPED | OUTPATIENT
Start: 2024-05-10

## 2024-06-25 ENCOUNTER — OFFICE VISIT (OUTPATIENT)
Age: 69
End: 2024-06-25
Payer: MEDICARE

## 2024-06-25 VITALS
RESPIRATION RATE: 20 BRPM | BODY MASS INDEX: 33.95 KG/M2 | HEART RATE: 56 BPM | DIASTOLIC BLOOD PRESSURE: 72 MMHG | SYSTOLIC BLOOD PRESSURE: 120 MMHG | WEIGHT: 236.6 LBS | OXYGEN SATURATION: 96 % | TEMPERATURE: 97.4 F

## 2024-06-25 DIAGNOSIS — R60.0 LEG EDEMA, LEFT: ICD-10-CM

## 2024-06-25 DIAGNOSIS — J44.9 CHRONIC OBSTRUCTIVE PULMONARY DISEASE, UNSPECIFIED COPD TYPE (HCC): Chronic | ICD-10-CM

## 2024-06-25 DIAGNOSIS — F43.10 PTSD (POST-TRAUMATIC STRESS DISORDER): ICD-10-CM

## 2024-06-25 DIAGNOSIS — Z72.0 TOBACCO ABUSE: ICD-10-CM

## 2024-06-25 DIAGNOSIS — I73.9 PAD (PERIPHERAL ARTERY DISEASE) (HCC): ICD-10-CM

## 2024-06-25 DIAGNOSIS — I10 PRIMARY HYPERTENSION: ICD-10-CM

## 2024-06-25 DIAGNOSIS — J30.9 ALLERGIC RHINITIS, UNSPECIFIED SEASONALITY, UNSPECIFIED TRIGGER: Chronic | ICD-10-CM

## 2024-06-25 DIAGNOSIS — R97.20 ELEVATED PSA: ICD-10-CM

## 2024-06-25 DIAGNOSIS — F07.81 POST CONCUSSION SYNDROME: ICD-10-CM

## 2024-06-25 DIAGNOSIS — E78.2 MIXED HYPERLIPIDEMIA: Primary | ICD-10-CM

## 2024-06-25 PROCEDURE — 1123F ACP DISCUSS/DSCN MKR DOCD: CPT | Performed by: INTERNAL MEDICINE

## 2024-06-25 PROCEDURE — 99214 OFFICE O/P EST MOD 30 MIN: CPT | Performed by: INTERNAL MEDICINE

## 2024-06-25 PROCEDURE — 3074F SYST BP LT 130 MM HG: CPT | Performed by: INTERNAL MEDICINE

## 2024-06-25 PROCEDURE — 36415 COLL VENOUS BLD VENIPUNCTURE: CPT | Performed by: INTERNAL MEDICINE

## 2024-06-25 PROCEDURE — 3078F DIAST BP <80 MM HG: CPT | Performed by: INTERNAL MEDICINE

## 2024-06-25 SDOH — ECONOMIC STABILITY: FOOD INSECURITY: WITHIN THE PAST 12 MONTHS, YOU WORRIED THAT YOUR FOOD WOULD RUN OUT BEFORE YOU GOT MONEY TO BUY MORE.: NEVER TRUE

## 2024-06-25 SDOH — ECONOMIC STABILITY: INCOME INSECURITY: HOW HARD IS IT FOR YOU TO PAY FOR THE VERY BASICS LIKE FOOD, HOUSING, MEDICAL CARE, AND HEATING?: NOT HARD AT ALL

## 2024-06-25 SDOH — ECONOMIC STABILITY: FOOD INSECURITY: WITHIN THE PAST 12 MONTHS, THE FOOD YOU BOUGHT JUST DIDN'T LAST AND YOU DIDN'T HAVE MONEY TO GET MORE.: NEVER TRUE

## 2024-06-25 ASSESSMENT — PATIENT HEALTH QUESTIONNAIRE - PHQ9
SUM OF ALL RESPONSES TO PHQ QUESTIONS 1-9: 0
1. LITTLE INTEREST OR PLEASURE IN DOING THINGS: NOT AT ALL
SUM OF ALL RESPONSES TO PHQ QUESTIONS 1-9: 0
2. FEELING DOWN, DEPRESSED OR HOPELESS: NOT AT ALL
SUM OF ALL RESPONSES TO PHQ9 QUESTIONS 1 & 2: 0
SUM OF ALL RESPONSES TO PHQ QUESTIONS 1-9: 0
SUM OF ALL RESPONSES TO PHQ QUESTIONS 1-9: 0
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
## 2024-06-26 LAB
ALBUMIN SERPL-MCNC: 4.2 G/DL (ref 3.4–5)
ALBUMIN/GLOB SERPL: 1.5 {RATIO} (ref 1.1–2.2)
ALP SERPL-CCNC: 77 U/L (ref 40–129)
ALT SERPL-CCNC: 13 U/L (ref 10–40)
ANION GAP SERPL CALCULATED.3IONS-SCNC: 11 MMOL/L (ref 3–16)
AST SERPL-CCNC: 14 U/L (ref 15–37)
BILIRUB SERPL-MCNC: 0.5 MG/DL (ref 0–1)
BUN SERPL-MCNC: 12 MG/DL (ref 7–20)
CALCIUM SERPL-MCNC: 9.8 MG/DL (ref 8.3–10.6)
CHLORIDE SERPL-SCNC: 107 MMOL/L (ref 99–110)
CHOLEST SERPL-MCNC: 144 MG/DL (ref 0–199)
CO2 SERPL-SCNC: 24 MMOL/L (ref 21–32)
CREAT SERPL-MCNC: 1.1 MG/DL (ref 0.8–1.3)
GFR SERPLBLD CREATININE-BSD FMLA CKD-EPI: 74 ML/MIN/{1.73_M2}
GLUCOSE SERPL-MCNC: 100 MG/DL (ref 70–99)
HDLC SERPL-MCNC: 33 MG/DL (ref 40–60)
LDL CHOLESTEROL: 88 MG/DL
POTASSIUM SERPL-SCNC: 5.2 MMOL/L (ref 3.5–5.1)
PROT SERPL-MCNC: 7 G/DL (ref 6.4–8.2)
SODIUM SERPL-SCNC: 142 MMOL/L (ref 136–145)
TRIGL SERPL-MCNC: 115 MG/DL (ref 0–150)
VLDLC SERPL CALC-MCNC: 23 MG/DL

## 2024-07-09 DIAGNOSIS — E87.5 HYPERKALEMIA: Primary | ICD-10-CM

## 2024-07-12 ENCOUNTER — PATIENT MESSAGE (OUTPATIENT)
Age: 69
End: 2024-07-12

## 2024-08-06 DIAGNOSIS — J44.9 CHRONIC OBSTRUCTIVE PULMONARY DISEASE, UNSPECIFIED COPD TYPE (HCC): ICD-10-CM

## 2024-08-07 RX ORDER — BUDESONIDE AND FORMOTEROL FUMARATE DIHYDRATE 160; 4.5 UG/1; UG/1
AEROSOL RESPIRATORY (INHALATION)
Qty: 10.2 G | Refills: 10 | Status: SHIPPED | OUTPATIENT
Start: 2024-08-07 | End: 2024-08-08

## 2024-08-08 DIAGNOSIS — J44.9 CHRONIC OBSTRUCTIVE PULMONARY DISEASE, UNSPECIFIED COPD TYPE (HCC): ICD-10-CM

## 2024-08-08 RX ORDER — BUDESONIDE AND FORMOTEROL FUMARATE DIHYDRATE 160; 4.5 UG/1; UG/1
AEROSOL RESPIRATORY (INHALATION)
Qty: 10.2 G | Refills: 10 | Status: SHIPPED | OUTPATIENT
Start: 2024-08-08

## 2024-08-08 NOTE — TELEPHONE ENCOUNTER
Medication:   Requested Prescriptions     Pending Prescriptions Disp Refills    SYMBICORT 160-4.5 MCG/ACT AERO [Pharmacy Med Name: SYMBICORT 160-4.5MCG 10.2GM 160-4.5 Aerosol] 10.2 g 10     Sig: INHALE TWO (2) PUFFS BY MOUTH INTO THE LUNGS TWICE DAILY        Last Filled:      Patient Phone Number: [phone] (home) [phone] (work)    Last appt: 6/25/2024   Next appt: 9/6/2024    Last OARRS:       3/10/2020     1:22 PM   RX Monitoring   Periodic Controlled Substance Monitoring No signs of potential drug abuse or diversion identified.

## 2024-09-06 ENCOUNTER — TELEMEDICINE (OUTPATIENT)
Age: 69
End: 2024-09-06
Payer: MEDICARE

## 2024-09-06 DIAGNOSIS — Z00.00 MEDICARE ANNUAL WELLNESS VISIT, SUBSEQUENT: Primary | ICD-10-CM

## 2024-09-06 PROCEDURE — G0439 PPPS, SUBSEQ VISIT: HCPCS | Performed by: INTERNAL MEDICINE

## 2024-09-06 PROCEDURE — 1123F ACP DISCUSS/DSCN MKR DOCD: CPT | Performed by: INTERNAL MEDICINE

## 2024-09-06 ASSESSMENT — PATIENT HEALTH QUESTIONNAIRE - PHQ9
1. LITTLE INTEREST OR PLEASURE IN DOING THINGS: NOT AT ALL
SUM OF ALL RESPONSES TO PHQ QUESTIONS 1-9: 0
SUM OF ALL RESPONSES TO PHQ9 QUESTIONS 1 & 2: 0
SUM OF ALL RESPONSES TO PHQ QUESTIONS 1-9: 0
2. FEELING DOWN, DEPRESSED OR HOPELESS: NOT AT ALL
SUM OF ALL RESPONSES TO PHQ QUESTIONS 1-9: 0
SUM OF ALL RESPONSES TO PHQ QUESTIONS 1-9: 0

## 2024-09-06 ASSESSMENT — LIFESTYLE VARIABLES
HOW MANY STANDARD DRINKS CONTAINING ALCOHOL DO YOU HAVE ON A TYPICAL DAY: PATIENT DOES NOT DRINK
HOW OFTEN DO YOU HAVE A DRINK CONTAINING ALCOHOL: NEVER

## 2024-09-25 ENCOUNTER — OFFICE VISIT (OUTPATIENT)
Age: 69
End: 2024-09-25
Payer: MEDICARE

## 2024-09-25 VITALS
TEMPERATURE: 98 F | DIASTOLIC BLOOD PRESSURE: 80 MMHG | WEIGHT: 238.6 LBS | RESPIRATION RATE: 16 BRPM | HEART RATE: 64 BPM | BODY MASS INDEX: 34.16 KG/M2 | SYSTOLIC BLOOD PRESSURE: 130 MMHG | HEIGHT: 70 IN | OXYGEN SATURATION: 96 %

## 2024-09-25 DIAGNOSIS — Z72.0 TOBACCO ABUSE: ICD-10-CM

## 2024-09-25 DIAGNOSIS — R60.0 LEG EDEMA, LEFT: ICD-10-CM

## 2024-09-25 DIAGNOSIS — R97.20 ELEVATED PSA: ICD-10-CM

## 2024-09-25 DIAGNOSIS — E78.2 MIXED HYPERLIPIDEMIA: ICD-10-CM

## 2024-09-25 DIAGNOSIS — J30.9 ALLERGIC RHINITIS, UNSPECIFIED SEASONALITY, UNSPECIFIED TRIGGER: Chronic | ICD-10-CM

## 2024-09-25 DIAGNOSIS — I73.9 PAD (PERIPHERAL ARTERY DISEASE) (HCC): ICD-10-CM

## 2024-09-25 DIAGNOSIS — I10 PRIMARY HYPERTENSION: Primary | ICD-10-CM

## 2024-09-25 DIAGNOSIS — J44.9 CHRONIC OBSTRUCTIVE PULMONARY DISEASE, UNSPECIFIED COPD TYPE (HCC): Chronic | ICD-10-CM

## 2024-09-25 PROCEDURE — 99214 OFFICE O/P EST MOD 30 MIN: CPT | Performed by: INTERNAL MEDICINE

## 2024-09-25 PROCEDURE — 3075F SYST BP GE 130 - 139MM HG: CPT | Performed by: INTERNAL MEDICINE

## 2024-09-25 PROCEDURE — 3079F DIAST BP 80-89 MM HG: CPT | Performed by: INTERNAL MEDICINE

## 2024-09-25 PROCEDURE — 1123F ACP DISCUSS/DSCN MKR DOCD: CPT | Performed by: INTERNAL MEDICINE

## 2024-09-25 RX ORDER — METOPROLOL SUCCINATE 50 MG/1
50 TABLET, EXTENDED RELEASE ORAL DAILY
Qty: 30 TABLET | Refills: 5 | Status: SHIPPED | OUTPATIENT
Start: 2024-09-25

## 2024-09-25 RX ORDER — ROSUVASTATIN CALCIUM 5 MG/1
TABLET, COATED ORAL
Qty: 46 TABLET | Refills: 1 | Status: SHIPPED | OUTPATIENT
Start: 2024-09-25

## 2024-09-25 RX ORDER — CLOPIDOGREL BISULFATE 75 MG/1
75 TABLET ORAL DAILY
Qty: 30 TABLET | Refills: 5 | Status: SHIPPED | OUTPATIENT
Start: 2024-09-25

## 2024-09-25 RX ORDER — DOXAZOSIN 4 MG/1
TABLET ORAL
Qty: 60 TABLET | Refills: 5 | Status: SHIPPED | OUTPATIENT
Start: 2024-09-25

## 2024-09-25 ASSESSMENT — PATIENT HEALTH QUESTIONNAIRE - PHQ9
SUM OF ALL RESPONSES TO PHQ9 QUESTIONS 1 & 2: 0
SUM OF ALL RESPONSES TO PHQ QUESTIONS 1-9: 0
1. LITTLE INTEREST OR PLEASURE IN DOING THINGS: NOT AT ALL
SUM OF ALL RESPONSES TO PHQ QUESTIONS 1-9: 0
2. FEELING DOWN, DEPRESSED OR HOPELESS: NOT AT ALL

## 2024-10-03 ENCOUNTER — HOSPITAL ENCOUNTER (OUTPATIENT)
Age: 69
Discharge: HOME | End: 2024-10-03
Payer: MEDICARE

## 2024-10-03 DIAGNOSIS — I10 PRIMARY HYPERTENSION: ICD-10-CM

## 2024-10-03 DIAGNOSIS — E87.5 HYPERKALEMIA: ICD-10-CM

## 2024-10-03 LAB
ANION GAP SERPL CALCULATED.3IONS-SCNC: 10 MMOL/L (ref 4–16)
BUN SERPL-MCNC: 10 MG/DL (ref 6–23)
CALCIUM SERPL-MCNC: 9.1 MG/DL (ref 8.3–10.6)
CHLORIDE SERPL-SCNC: 104 MMOL/L (ref 99–110)
CO2 SERPL-SCNC: 27 MMOL/L (ref 21–32)
CREAT SERPL-MCNC: 1 MG/DL (ref 0.9–1.3)
GFR, ESTIMATED: 83 ML/MIN/1.73M2
GLUCOSE SERPL-MCNC: 92 MG/DL (ref 70–99)
POTASSIUM SERPL-SCNC: 4.6 MMOL/L (ref 3.5–5.1)
SODIUM SERPL-SCNC: 141 MMOL/L (ref 135–145)

## 2024-10-03 PROCEDURE — 80048 BASIC METABOLIC PNL TOTAL CA: CPT

## 2024-10-03 PROCEDURE — 36415 COLL VENOUS BLD VENIPUNCTURE: CPT

## 2025-02-03 ENCOUNTER — OFFICE VISIT (OUTPATIENT)
Age: 70
End: 2025-02-03
Payer: MEDICARE

## 2025-02-03 VITALS
HEART RATE: 76 BPM | BODY MASS INDEX: 34.15 KG/M2 | TEMPERATURE: 97.9 F | SYSTOLIC BLOOD PRESSURE: 122 MMHG | DIASTOLIC BLOOD PRESSURE: 78 MMHG | WEIGHT: 238 LBS | OXYGEN SATURATION: 97 % | RESPIRATION RATE: 16 BRPM

## 2025-02-03 DIAGNOSIS — Z12.5 SCREENING PSA (PROSTATE SPECIFIC ANTIGEN): ICD-10-CM

## 2025-02-03 DIAGNOSIS — J30.9 ALLERGIC RHINITIS, UNSPECIFIED SEASONALITY, UNSPECIFIED TRIGGER: Chronic | ICD-10-CM

## 2025-02-03 DIAGNOSIS — R97.20 ELEVATED PSA: ICD-10-CM

## 2025-02-03 DIAGNOSIS — Z72.0 TOBACCO ABUSE: ICD-10-CM

## 2025-02-03 DIAGNOSIS — J44.9 CHRONIC OBSTRUCTIVE PULMONARY DISEASE, UNSPECIFIED COPD TYPE (HCC): ICD-10-CM

## 2025-02-03 DIAGNOSIS — I73.9 PAD (PERIPHERAL ARTERY DISEASE) (HCC): ICD-10-CM

## 2025-02-03 DIAGNOSIS — R09.81 SINUS CONGESTION: ICD-10-CM

## 2025-02-03 DIAGNOSIS — I10 PRIMARY HYPERTENSION: ICD-10-CM

## 2025-02-03 DIAGNOSIS — E78.2 MIXED HYPERLIPIDEMIA: Primary | ICD-10-CM

## 2025-02-03 PROCEDURE — 1123F ACP DISCUSS/DSCN MKR DOCD: CPT | Performed by: INTERNAL MEDICINE

## 2025-02-03 PROCEDURE — 3017F COLORECTAL CA SCREEN DOC REV: CPT | Performed by: INTERNAL MEDICINE

## 2025-02-03 PROCEDURE — G8427 DOCREV CUR MEDS BY ELIG CLIN: HCPCS | Performed by: INTERNAL MEDICINE

## 2025-02-03 PROCEDURE — G8417 CALC BMI ABV UP PARAM F/U: HCPCS | Performed by: INTERNAL MEDICINE

## 2025-02-03 PROCEDURE — 3078F DIAST BP <80 MM HG: CPT | Performed by: INTERNAL MEDICINE

## 2025-02-03 PROCEDURE — 3074F SYST BP LT 130 MM HG: CPT | Performed by: INTERNAL MEDICINE

## 2025-02-03 PROCEDURE — 1159F MED LIST DOCD IN RCRD: CPT | Performed by: INTERNAL MEDICINE

## 2025-02-03 PROCEDURE — 4004F PT TOBACCO SCREEN RCVD TLK: CPT | Performed by: INTERNAL MEDICINE

## 2025-02-03 PROCEDURE — 99214 OFFICE O/P EST MOD 30 MIN: CPT | Performed by: INTERNAL MEDICINE

## 2025-02-03 PROCEDURE — 3023F SPIROM DOC REV: CPT | Performed by: INTERNAL MEDICINE

## 2025-02-03 RX ORDER — METOPROLOL SUCCINATE 50 MG/1
50 TABLET, EXTENDED RELEASE ORAL DAILY
Qty: 30 TABLET | Refills: 5 | Status: SHIPPED | OUTPATIENT
Start: 2025-02-03

## 2025-02-03 RX ORDER — DOXAZOSIN 4 MG/1
TABLET ORAL
Qty: 60 TABLET | Refills: 5 | Status: SHIPPED | OUTPATIENT
Start: 2025-02-03

## 2025-02-03 RX ORDER — CLOPIDOGREL BISULFATE 75 MG/1
75 TABLET ORAL DAILY
Qty: 30 TABLET | Refills: 5 | Status: SHIPPED | OUTPATIENT
Start: 2025-02-03

## 2025-02-03 RX ORDER — ROSUVASTATIN CALCIUM 5 MG/1
TABLET, COATED ORAL
Qty: 46 TABLET | Refills: 1 | Status: SHIPPED | OUTPATIENT
Start: 2025-02-03

## 2025-02-03 RX ORDER — IPRATROPIUM BROMIDE 21 UG/1
SPRAY, METERED NASAL
Qty: 30 ML | Refills: 10 | Status: CANCELLED | OUTPATIENT
Start: 2025-02-03

## 2025-02-03 RX ORDER — BUDESONIDE AND FORMOTEROL FUMARATE DIHYDRATE 160; 4.5 UG/1; UG/1
AEROSOL RESPIRATORY (INHALATION)
Qty: 10.2 G | Refills: 5 | Status: SHIPPED | OUTPATIENT
Start: 2025-02-03

## 2025-02-03 SDOH — ECONOMIC STABILITY: FOOD INSECURITY: WITHIN THE PAST 12 MONTHS, YOU WORRIED THAT YOUR FOOD WOULD RUN OUT BEFORE YOU GOT MONEY TO BUY MORE.: NEVER TRUE

## 2025-02-03 SDOH — ECONOMIC STABILITY: FOOD INSECURITY: WITHIN THE PAST 12 MONTHS, THE FOOD YOU BOUGHT JUST DIDN'T LAST AND YOU DIDN'T HAVE MONEY TO GET MORE.: NEVER TRUE

## 2025-02-03 ASSESSMENT — PATIENT HEALTH QUESTIONNAIRE - PHQ9
SUM OF ALL RESPONSES TO PHQ QUESTIONS 1-9: 0
SUM OF ALL RESPONSES TO PHQ9 QUESTIONS 1 & 2: 0
1. LITTLE INTEREST OR PLEASURE IN DOING THINGS: NOT AT ALL
SUM OF ALL RESPONSES TO PHQ QUESTIONS 1-9: 0
2. FEELING DOWN, DEPRESSED OR HOPELESS: NOT AT ALL

## 2025-02-03 ASSESSMENT — ENCOUNTER SYMPTOMS
COUGH: 0
SHORTNESS OF BREATH: 0
ALLERGIC/IMMUNOLOGIC NEGATIVE: 1
GASTROINTESTINAL NEGATIVE: 1
EYES NEGATIVE: 1
RESPIRATORY NEGATIVE: 1
WHEEZING: 0

## 2025-02-03 NOTE — PROGRESS NOTES
Randy Martinez  Patient's  is 1955  Seen in office on 2/3/2025      SUBJECTIVE:  Randy siu 69 y.o.year old male presents today   Chief Complaint   Patient presents with    Hypertension    Medication Refill     Patient is here for follow-up of hypertension, hyperlipidemia, tobacco abuse and COPD     Patient is still a smoker does not want to use any nicotine patches etc. advised to use lozenges.I ordered LDCT but pt did not get it done. Declined it.      Patient has hypertension. Taking medications No headaches, no chest pain, no palpitations and no dizziness.     Patient has hyperlipidemia and is on medications.  Follow low-cholesterol diet.  He takes Crestor 5 mg 3 times a week only.     Again discussed about elevated PSA again refused to see urologist     History of PTSD does not want to see any psychiatrist or psychologist.     COPD is stable on Symbicort.  Uses albuterol only as needed     Patient continues to have allergies.  Advised patient to use Flonase and Claritin    Taking medications regularly. No side effects noted.    Review of Systems   Constitutional: Negative.    HENT: Negative.     Eyes: Negative.    Respiratory: Negative.  Negative for cough, shortness of breath and wheezing.    Cardiovascular: Negative.    Gastrointestinal: Negative.    Endocrine: Negative.    Genitourinary: Negative.    Musculoskeletal: Negative.    Skin: Negative.    Allergic/Immunologic: Negative.        OBJECTIVE: /78   Pulse 76   Temp 97.9 °F (36.6 °C) (Oral)   Resp 16   Wt 108 kg (238 lb)   SpO2 97%   BMI 34.15 kg/m²     Wt Readings from Last 3 Encounters:   25 108 kg (238 lb)   24 108.2 kg (238 lb 9.6 oz)   24 107.3 kg (236 lb 9.6 oz)      GENERAL: - Alert, oriented, pleasant, in no apparent distress.    HEENT: - Conjunctiva pink, no scleral icterus. ENT clear.  NECK: -Supple.  No jugular venous distention noted. No masses felt,  CARDIOVASCULAR: - Normal S1 and S2    PULMONARY: - No

## 2025-02-05 RX ORDER — ALBUTEROL SULFATE 90 UG/1
INHALANT RESPIRATORY (INHALATION)
Qty: 6.7 G | Refills: 10 | Status: SHIPPED | OUTPATIENT
Start: 2025-02-05

## 2025-04-02 DIAGNOSIS — R09.81 SINUS CONGESTION: ICD-10-CM

## 2025-04-03 RX ORDER — IPRATROPIUM BROMIDE 21 UG/1
SPRAY, METERED NASAL
Qty: 30 ML | Refills: 10 | Status: SHIPPED | OUTPATIENT
Start: 2025-04-03

## 2025-04-28 ENCOUNTER — LAB (OUTPATIENT)
Age: 70
End: 2025-04-28
Payer: MEDICARE

## 2025-04-28 ENCOUNTER — RESULTS FOLLOW-UP (OUTPATIENT)
Age: 70
End: 2025-04-28

## 2025-04-28 DIAGNOSIS — E78.2 MIXED HYPERLIPIDEMIA: ICD-10-CM

## 2025-04-28 DIAGNOSIS — Z12.5 SCREENING PSA (PROSTATE SPECIFIC ANTIGEN): ICD-10-CM

## 2025-04-28 LAB
ALBUMIN SERPL-MCNC: 4 G/DL (ref 3.4–5)
ALBUMIN/GLOB SERPL: 1.6 {RATIO} (ref 1.1–2.2)
ALP SERPL-CCNC: 81 U/L (ref 40–129)
ALT SERPL-CCNC: 15 U/L (ref 10–40)
ANION GAP SERPL CALCULATED.3IONS-SCNC: 8 MMOL/L (ref 3–16)
AST SERPL-CCNC: 19 U/L (ref 15–37)
BILIRUB SERPL-MCNC: 0.4 MG/DL (ref 0–1)
BUN SERPL-MCNC: 11 MG/DL (ref 7–20)
CALCIUM SERPL-MCNC: 9.8 MG/DL (ref 8.3–10.6)
CHLORIDE SERPL-SCNC: 108 MMOL/L (ref 99–110)
CHOLEST SERPL-MCNC: 161 MG/DL (ref 0–199)
CO2 SERPL-SCNC: 27 MMOL/L (ref 21–32)
CREAT SERPL-MCNC: 1.1 MG/DL (ref 0.8–1.3)
GFR SERPLBLD CREATININE-BSD FMLA CKD-EPI: 73 ML/MIN/{1.73_M2}
GLUCOSE SERPL-MCNC: 93 MG/DL (ref 70–99)
HDLC SERPL-MCNC: 29 MG/DL (ref 40–60)
LDL CHOLESTEROL: 110 MG/DL
POTASSIUM SERPL-SCNC: 4.7 MMOL/L (ref 3.5–5.1)
PROT SERPL-MCNC: 6.5 G/DL (ref 6.4–8.2)
PSA SERPL DL<=0.01 NG/ML-MCNC: 14 NG/ML (ref 0–4)
SODIUM SERPL-SCNC: 143 MMOL/L (ref 136–145)
TRIGL SERPL-MCNC: 111 MG/DL (ref 0–150)
VLDLC SERPL CALC-MCNC: 22 MG/DL

## 2025-04-28 PROCEDURE — 36415 COLL VENOUS BLD VENIPUNCTURE: CPT | Performed by: INTERNAL MEDICINE

## 2025-04-28 NOTE — PROGRESS NOTES
1 SST obtained from right AC using BF needle without incident, Pt handled well.    Yes - the patient is able to be screened

## 2025-05-13 ENCOUNTER — HOSPITAL ENCOUNTER (EMERGENCY)
Age: 70
Discharge: NURSING, REHAB OR LONG-TERM CARE | End: 2025-05-14
Attending: STUDENT IN AN ORGANIZED HEALTH CARE EDUCATION/TRAINING PROGRAM
Payer: MEDICARE

## 2025-05-13 ENCOUNTER — APPOINTMENT (OUTPATIENT)
Dept: CT IMAGING | Age: 70
End: 2025-05-13
Payer: MEDICARE

## 2025-05-13 DIAGNOSIS — R41.0 CONFUSION: Primary | ICD-10-CM

## 2025-05-13 PROCEDURE — 70450 CT HEAD/BRAIN W/O DYE: CPT

## 2025-05-13 PROCEDURE — 6370000000 HC RX 637 (ALT 250 FOR IP): Performed by: STUDENT IN AN ORGANIZED HEALTH CARE EDUCATION/TRAINING PROGRAM

## 2025-05-13 PROCEDURE — 99284 EMERGENCY DEPT VISIT MOD MDM: CPT

## 2025-05-13 RX ORDER — OLANZAPINE 5 MG/1
5 TABLET, ORALLY DISINTEGRATING ORAL NIGHTLY
Status: DISCONTINUED | OUTPATIENT
Start: 2025-05-13 | End: 2025-05-14 | Stop reason: HOSPADM

## 2025-05-13 RX ADMIN — Medication 3 MG: at 23:49

## 2025-05-13 RX ADMIN — OLANZAPINE 5 MG: 5 TABLET, ORALLY DISINTEGRATING ORAL at 23:20

## 2025-05-13 ASSESSMENT — PAIN SCALES - GENERAL: PAINLEVEL_OUTOF10: 0

## 2025-05-13 ASSESSMENT — PAIN - FUNCTIONAL ASSESSMENT: PAIN_FUNCTIONAL_ASSESSMENT: 0-10

## 2025-05-14 VITALS
TEMPERATURE: 98.2 F | RESPIRATION RATE: 18 BRPM | SYSTOLIC BLOOD PRESSURE: 118 MMHG | HEART RATE: 101 BPM | DIASTOLIC BLOOD PRESSURE: 77 MMHG | OXYGEN SATURATION: 95 %

## 2025-05-14 PROCEDURE — 6370000000 HC RX 637 (ALT 250 FOR IP): Performed by: STUDENT IN AN ORGANIZED HEALTH CARE EDUCATION/TRAINING PROGRAM

## 2025-05-14 RX ORDER — OLANZAPINE 5 MG/1
5 TABLET, ORALLY DISINTEGRATING ORAL ONCE
Status: COMPLETED | OUTPATIENT
Start: 2025-05-14 | End: 2025-05-14

## 2025-05-14 RX ORDER — QUETIAPINE FUMARATE 50 MG/1
50 TABLET, FILM COATED ORAL
Qty: 30 TABLET | Refills: 1 | Status: SHIPPED | OUTPATIENT
Start: 2025-05-14

## 2025-05-14 RX ADMIN — OLANZAPINE 5 MG: 5 TABLET, ORALLY DISINTEGRATING ORAL at 01:54

## 2025-05-14 NOTE — ED NOTES
Patient arrived with EMS after [facility] sent patient out because he was reportedly being combative and trying to leave the facility states he wants to go back to a hospital. EMS reports not combative en route. Patient appears calm but confused on arrival.
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
## 2025-05-14 NOTE — DISCHARGE INSTRUCTIONS
Mr. Martinez was seen in the ED for confusion. Here he was in good conditions, with normal vital signs, pleasant. An medical examination was reassuring. A CT of his brain did not reveal any acute abnormality, specifically, no bleeding or new strokes.     His symptoms are due to delirium. Unfortunately, due to disorientation, he does not have decision making capacity and he benefits from care in nursing facility.  The treatment of delirium is not in the hospital, because it frequently worsens here, therefore at this time he does no benefit from admission.    To manage his symptoms in the facility, I sent a prescription for medication quetiapine.  It can be given to him every night before bedtime as needed.  Please also try to reorient him frequently.    If at some point you have severe shortness of breath, severe nausea or vomiting unable to keep anything down, feels severely weak unable to stand up, feels confused or are lethargic unable to do your normal daily activities, or have any other concerning symptoms, please come back promptly to the emergency department.

## 2025-05-21 ENCOUNTER — TELEPHONE (OUTPATIENT)
Age: 70
End: 2025-05-21

## 2025-05-21 NOTE — TELEPHONE ENCOUNTER
[facility] nurse called asking for an updated phone number for the patient. Confirmed the patient did have [facility] insurance, and the insurance company provided the correct birthday and Member ID. Member provided the only phone number first, gave them the current number on file.

## 2025-05-23 ENCOUNTER — HOSPITAL ENCOUNTER (EMERGENCY)
Age: 70
Discharge: ANOTHER HOSPITAL | End: 2025-05-23
Attending: STUDENT IN AN ORGANIZED HEALTH CARE EDUCATION/TRAINING PROGRAM
Payer: MEDICARE

## 2025-05-23 ENCOUNTER — APPOINTMENT (OUTPATIENT)
Dept: CT IMAGING | Age: 70
End: 2025-05-23
Payer: MEDICARE

## 2025-05-23 ENCOUNTER — HOSPITAL ENCOUNTER (INPATIENT)
Age: 70
LOS: 6 days | Discharge: NURSING, REHAB OR LONG-TERM CARE | DRG: 871 | End: 2025-05-29
Attending: STUDENT IN AN ORGANIZED HEALTH CARE EDUCATION/TRAINING PROGRAM | Admitting: STUDENT IN AN ORGANIZED HEALTH CARE EDUCATION/TRAINING PROGRAM
Payer: MEDICARE

## 2025-05-23 VITALS
WEIGHT: 238 LBS | BODY MASS INDEX: 34.07 KG/M2 | SYSTOLIC BLOOD PRESSURE: 93 MMHG | TEMPERATURE: 99.5 F | OXYGEN SATURATION: 91 % | HEART RATE: 74 BPM | DIASTOLIC BLOOD PRESSURE: 57 MMHG | RESPIRATION RATE: 18 BRPM | HEIGHT: 70 IN

## 2025-05-23 DIAGNOSIS — I48.0 PAROXYSMAL ATRIAL FIBRILLATION (HCC): Primary | ICD-10-CM

## 2025-05-23 DIAGNOSIS — M79.605 LEFT LEG PAIN: ICD-10-CM

## 2025-05-23 DIAGNOSIS — N17.9 AKI (ACUTE KIDNEY INJURY): ICD-10-CM

## 2025-05-23 DIAGNOSIS — A41.9 SEPTICEMIA (HCC): ICD-10-CM
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
## 2025-05-23 DIAGNOSIS — N12 PYELONEPHRITIS: Primary | ICD-10-CM

## 2025-05-23 PROBLEM — R65.20 SEVERE SEPSIS (HCC): Status: ACTIVE | Noted: 2025-05-23

## 2025-05-23 LAB
ABSOLUTE BANDS: 1.86 K/UL
ALBUMIN SERPL-MCNC: 3.5 G/DL (ref 3.4–5)
ALBUMIN/GLOB SERPL: 0.8 {RATIO}
ALP SERPL-CCNC: 111 U/L (ref 40–129)
ALT SERPL-CCNC: 32 U/L (ref 10–40)
ANION GAP SERPL CALCULATED.3IONS-SCNC: 18 MMOL/L (ref 9–17)
AST SERPL-CCNC: 43 U/L (ref 15–37)
BACTERIA URNS QL MICRO: ABNORMAL
BANDS: 7 %
BASOPHILS # BLD: 0 K/UL
BASOPHILS NFR BLD: 0 % (ref 0–1)
BILIRUB SERPL-MCNC: 2.8 MG/DL (ref 0–1)
BILIRUB UR QL STRIP: NEGATIVE
BUN SERPL-MCNC: 39 MG/DL (ref 7–20)
CALCIUM SERPL-MCNC: 9.5 MG/DL (ref 8.3–10.6)
CHARACTER UR: ABNORMAL
CHLORIDE SERPL-SCNC: 106 MMOL/L (ref 99–110)
CK SERPL-CCNC: 84 U/L (ref 26–192)
CLARITY UR: CLEAR
CO2 SERPL-SCNC: 18 MMOL/L (ref 21–32)
COLOR UR: YELLOW
CREAT SERPL-MCNC: 2.7 MG/DL (ref 0.8–1.3)
EKG ATRIAL RATE: 74 BPM
EKG DIAGNOSIS: NORMAL
EKG P AXIS: 74 DEGREES
EKG P-R INTERVAL: 150 MS
EKG Q-T INTERVAL: 386 MS
EKG QRS DURATION: 92 MS
EKG QTC CALCULATION (BAZETT): 428 MS
EKG R AXIS: 39 DEGREES
EKG T AXIS: 61 DEGREES
EKG VENTRICULAR RATE: 74 BPM
EOSINOPHIL # BLD: 0 K/UL
EOSINOPHILS RELATIVE PERCENT: 0 % (ref 0–3)
EPI CELLS #/AREA URNS HPF: ABNORMAL /HPF
ERYTHROCYTE [DISTWIDTH] IN BLOOD BY AUTOMATED COUNT: 14.8 % (ref 11.7–14.9)
GFR, ESTIMATED: 26 ML/MIN/1.73M2
GLUCOSE SERPL-MCNC: 95 MG/DL (ref 74–99)
GLUCOSE UR STRIP-MCNC: NEGATIVE MG/DL
HCT VFR BLD AUTO: 50.9 % (ref 42–52)
HGB BLD-MCNC: 15.8 G/DL (ref 13.5–18)
HGB UR QL STRIP.AUTO: ABNORMAL
KETONES UR STRIP-MCNC: NEGATIVE MG/DL
LACTATE BLDV-SCNC: 1.8 MMOL/L (ref 0.4–2)
LEUKOCYTE ESTERASE UR QL STRIP: ABNORMAL
LYMPHOCYTES NFR BLD: 0.53 K/UL
LYMPHOCYTES RELATIVE PERCENT: 2 % (ref 24–44)
MCH RBC QN AUTO: 29.8 PG (ref 27–31)
MCHC RBC AUTO-ENTMCNC: 31 G/DL (ref 32–36)
MCV RBC AUTO: 95.9 FL (ref 78–100)
MONOCYTES NFR BLD: 0.8 K/UL
MONOCYTES NFR BLD: 3 % (ref 0–5)
NEUTROPHILS NFR BLD: 88 % (ref 36–66)
NEUTS SEG NFR BLD: 23.41 K/UL
NITRITE UR QL STRIP: POSITIVE
PH UR STRIP: 6 [PH] (ref 5–8)
PLATELET # BLD AUTO: 243 K/UL (ref 140–440)
PLATELET ESTIMATE: NORMAL
PMV BLD AUTO: 11.2 FL (ref 7.5–11.1)
POTASSIUM SERPL-SCNC: 4.5 MMOL/L (ref 3.5–5.1)
PROT SERPL-MCNC: 7.8 G/DL (ref 6.4–8.2)
PROT UR STRIP-MCNC: 30 MG/DL
RBC # BLD AUTO: 5.31 M/UL (ref 4.6–6.2)
RBC # BLD: NORMAL 10*6/UL
RBC #/AREA URNS HPF: ABNORMAL /HPF
SODIUM SERPL-SCNC: 141 MMOL/L (ref 136–145)
SP GR UR STRIP: 1.02 (ref 1–1.03)
UROBILINOGEN UR STRIP-ACNC: 0.2 EU/DL (ref 0–1)
WBC # BLD: NORMAL 10*3/UL
WBC #/AREA URNS HPF: ABNORMAL /HPF
WBC OTHER # BLD: 26.6 K/UL (ref 4–10.5)

## 2025-05-23 PROCEDURE — 36415 COLL VENOUS BLD VENIPUNCTURE: CPT

## 2025-05-23 PROCEDURE — 82550 ASSAY OF CK (CPK): CPT

## 2025-05-23 PROCEDURE — 6370000000 HC RX 637 (ALT 250 FOR IP): Performed by: STUDENT IN AN ORGANIZED HEALTH CARE EDUCATION/TRAINING PROGRAM

## 2025-05-23 PROCEDURE — 87086 URINE CULTURE/COLONY COUNT: CPT

## 2025-05-23 PROCEDURE — 71250 CT THORAX DX C-: CPT

## 2025-05-23 PROCEDURE — 76376 3D RENDER W/INTRP POSTPROCES: CPT

## 2025-05-23 PROCEDURE — 2500000003 HC RX 250 WO HCPCS: Performed by: STUDENT IN AN ORGANIZED HEALTH CARE EDUCATION/TRAINING PROGRAM

## 2025-05-23 PROCEDURE — 80053 COMPREHEN METABOLIC PANEL: CPT

## 2025-05-23 PROCEDURE — 99285 EMERGENCY DEPT VISIT HI MDM: CPT

## 2025-05-23 PROCEDURE — 83605 ASSAY OF LACTIC ACID: CPT

## 2025-05-23 PROCEDURE — 72125 CT NECK SPINE W/O DYE: CPT

## 2025-05-23 PROCEDURE — 70450 CT HEAD/BRAIN W/O DYE: CPT

## 2025-05-23 PROCEDURE — 81001 URINALYSIS AUTO W/SCOPE: CPT

## 2025-05-23 PROCEDURE — 85025 COMPLETE CBC W/AUTO DIFF WBC: CPT

## 2025-05-23 PROCEDURE — 87186 SC STD MICRODIL/AGAR DIL: CPT

## 2025-05-23 PROCEDURE — 96374 THER/PROPH/DIAG INJ IV PUSH: CPT

## 2025-05-23 PROCEDURE — 2060000000 HC ICU INTERMEDIATE R&B

## 2025-05-23 PROCEDURE — 96361 HYDRATE IV INFUSION ADD-ON: CPT

## 2025-05-23 PROCEDURE — 93005 ELECTROCARDIOGRAM TRACING: CPT | Performed by: STUDENT IN AN ORGANIZED HEALTH CARE EDUCATION/TRAINING PROGRAM

## 2025-05-23 PROCEDURE — 87088 URINE BACTERIA CULTURE: CPT

## 2025-05-23 PROCEDURE — 6360000002 HC RX W HCPCS: Performed by: STUDENT IN AN ORGANIZED HEALTH CARE EDUCATION/TRAINING PROGRAM

## 2025-05-23 PROCEDURE — 93010 ELECTROCARDIOGRAM REPORT: CPT | Performed by: INTERNAL MEDICINE

## 2025-05-23 PROCEDURE — 2580000003 HC RX 258: Performed by: STUDENT IN AN ORGANIZED HEALTH CARE EDUCATION/TRAINING PROGRAM

## 2025-05-23 PROCEDURE — 2580000003 HC RX 258

## 2025-05-23 RX ORDER — ROSUVASTATIN CALCIUM 5 MG/1
10 TABLET, COATED ORAL NIGHTLY
Status: DISCONTINUED | OUTPATIENT
Start: 2025-05-23 | End: 2025-05-29 | Stop reason: HOSPADM

## 2025-05-23 RX ORDER — FLUTICASONE PROPIONATE 110 UG/1
1 AEROSOL, METERED RESPIRATORY (INHALATION)
Status: DISCONTINUED | OUTPATIENT
Start: 2025-05-23 | End: 2025-05-24

## 2025-05-23 RX ORDER — CLOPIDOGREL BISULFATE 75 MG/1
75 TABLET ORAL DAILY
Status: DISCONTINUED | OUTPATIENT
Start: 2025-05-24 | End: 2025-05-29

## 2025-05-23 RX ORDER — 0.9 % SODIUM CHLORIDE 0.9 %
1250 INTRAVENOUS SOLUTION INTRAVENOUS ONCE
Status: COMPLETED | OUTPATIENT
Start: 2025-05-23 | End: 2025-05-23

## 2025-05-23 RX ORDER — ENOXAPARIN SODIUM 100 MG/ML
40 INJECTION SUBCUTANEOUS DAILY
Status: DISCONTINUED | OUTPATIENT
Start: 2025-05-24 | End: 2025-05-23

## 2025-05-23 RX ORDER — METOPROLOL SUCCINATE 50 MG/1
50 TABLET, EXTENDED RELEASE ORAL DAILY
Status: DISCONTINUED | OUTPATIENT
Start: 2025-05-24 | End: 2025-05-25

## 2025-05-23 RX ORDER — POTASSIUM CHLORIDE 1500 MG/1
40 TABLET, EXTENDED RELEASE ORAL PRN
Status: DISCONTINUED | OUTPATIENT
Start: 2025-05-23 | End: 2025-05-29 | Stop reason: HOSPADM

## 2025-05-23 RX ORDER — ENOXAPARIN SODIUM 100 MG/ML
30 INJECTION SUBCUTANEOUS 2 TIMES DAILY
Status: DISCONTINUED | OUTPATIENT
Start: 2025-05-24 | End: 2025-05-28

## 2025-05-23 RX ORDER — 0.9 % SODIUM CHLORIDE 0.9 %
1000 INTRAVENOUS SOLUTION INTRAVENOUS ONCE
Status: COMPLETED | OUTPATIENT
Start: 2025-05-23 | End: 2025-05-23

## 2025-05-23 RX ORDER — POTASSIUM CHLORIDE 7.45 MG/ML
10 INJECTION INTRAVENOUS PRN
Status: DISCONTINUED | OUTPATIENT
Start: 2025-05-23 | End: 2025-05-29 | Stop reason: HOSPADM

## 2025-05-23 RX ORDER — ACETAMINOPHEN 325 MG/1
650 TABLET ORAL EVERY 6 HOURS PRN
Status: DISCONTINUED | OUTPATIENT
Start: 2025-05-23 | End: 2025-05-29 | Stop reason: HOSPADM

## 2025-05-23 RX ORDER — ALBUTEROL SULFATE 90 UG/1
2 INHALANT RESPIRATORY (INHALATION) EVERY 6 HOURS PRN
Status: DISCONTINUED | OUTPATIENT
Start: 2025-05-23 | End: 2025-05-29 | Stop reason: HOSPADM

## 2025-05-23 RX ORDER — QUETIAPINE FUMARATE 25 MG/1
50 TABLET, FILM COATED ORAL
Status: DISCONTINUED | OUTPATIENT
Start: 2025-05-23 | End: 2025-05-29 | Stop reason: HOSPADM

## 2025-05-23 RX ORDER — ONDANSETRON 2 MG/ML
4 INJECTION INTRAMUSCULAR; INTRAVENOUS EVERY 6 HOURS PRN
Status: DISCONTINUED | OUTPATIENT
Start: 2025-05-23 | End: 2025-05-29 | Stop reason: HOSPADM

## 2025-05-23 RX ORDER — BUDESONIDE AND FORMOTEROL FUMARATE DIHYDRATE 160; 4.5 UG/1; UG/1
2 AEROSOL RESPIRATORY (INHALATION)
Status: DISCONTINUED | OUTPATIENT
Start: 2025-05-23 | End: 2025-05-29 | Stop reason: HOSPADM

## 2025-05-23 RX ORDER — SODIUM CHLORIDE 9 MG/ML
INJECTION, SOLUTION INTRAVENOUS
Status: COMPLETED
Start: 2025-05-23 | End: 2025-05-23

## 2025-05-23 RX ORDER — LIDOCAINE HYDROCHLORIDE 20 MG/ML
JELLY TOPICAL PRN
Status: DISCONTINUED | OUTPATIENT
Start: 2025-05-23 | End: 2025-05-23 | Stop reason: HOSPADM

## 2025-05-23 RX ORDER — 0.9 % SODIUM CHLORIDE 0.9 %
1000 INTRAVENOUS SOLUTION INTRAVENOUS ONCE
Status: COMPLETED | OUTPATIENT
Start: 2025-05-23 | End: 2025-05-24

## 2025-05-23 RX ORDER — POLYETHYLENE GLYCOL 3350 17 G/17G
17 POWDER, FOR SOLUTION ORAL DAILY PRN
Status: DISCONTINUED | OUTPATIENT
Start: 2025-05-23 | End: 2025-05-29 | Stop reason: HOSPADM

## 2025-05-23 RX ORDER — ONDANSETRON 4 MG/1
4 TABLET, ORALLY DISINTEGRATING ORAL EVERY 8 HOURS PRN
Status: DISCONTINUED | OUTPATIENT
Start: 2025-05-23 | End: 2025-05-29 | Stop reason: HOSPADM

## 2025-05-23 RX ORDER — MAGNESIUM SULFATE IN WATER 40 MG/ML
2000 INJECTION, SOLUTION INTRAVENOUS PRN
Status: DISCONTINUED | OUTPATIENT
Start: 2025-05-23 | End: 2025-05-29 | Stop reason: HOSPADM

## 2025-05-23 RX ORDER — ACETAMINOPHEN 650 MG/1
650 SUPPOSITORY RECTAL EVERY 6 HOURS PRN
Status: DISCONTINUED | OUTPATIENT
Start: 2025-05-23 | End: 2025-05-29 | Stop reason: HOSPADM

## 2025-05-23 RX ADMIN — SODIUM CHLORIDE 1250 ML: 0.9 INJECTION, SOLUTION INTRAVENOUS at 20:05

## 2025-05-23 RX ADMIN — Medication 1250 ML: at 20:05

## 2025-05-23 RX ADMIN — LIDOCAINE HYDROCHLORIDE: 20 JELLY TOPICAL at 20:42

## 2025-05-23 RX ADMIN — CEFTRIAXONE SODIUM 1000 MG: 1 INJECTION, POWDER, FOR SOLUTION INTRAMUSCULAR; INTRAVENOUS at 18:00

## 2025-05-23 RX ADMIN — SODIUM CHLORIDE 1000 ML: 0.9 INJECTION, SOLUTION INTRAVENOUS at 17:03

## 2025-05-23 ASSESSMENT — LIFESTYLE VARIABLES
HOW OFTEN DO YOU HAVE A DRINK CONTAINING ALCOHOL: NEVER
HOW MANY STANDARD DRINKS CONTAINING ALCOHOL DO YOU HAVE ON A TYPICAL DAY: PATIENT DOES NOT DRINK
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
## 2025-05-24 ENCOUNTER — APPOINTMENT (OUTPATIENT)
Dept: CT IMAGING | Age: 70
DRG: 871 | End: 2025-05-24
Attending: STUDENT IN AN ORGANIZED HEALTH CARE EDUCATION/TRAINING PROGRAM
Payer: MEDICARE

## 2025-05-24 LAB
ABSOLUTE BANDS: 1.9 K/UL
ABSOLUTE BANDS: 2.31 K/UL
ABSOLUTE BANDS: 5.7 K/UL
ALBUMIN SERPL-MCNC: 2.6 G/DL (ref 3.4–5)
ALBUMIN SERPL-MCNC: 2.7 G/DL (ref 3.4–5)
ALBUMIN/GLOB SERPL: 0.8 {RATIO} (ref 1.1–2.2)
ALBUMIN/GLOB SERPL: 0.9 {RATIO} (ref 1.1–2.2)
ALP SERPL-CCNC: 85 U/L (ref 40–129)
ALP SERPL-CCNC: 91 U/L (ref 40–129)
ALT SERPL-CCNC: 33 U/L (ref 10–40)
ALT SERPL-CCNC: 35 U/L (ref 10–40)
ANION GAP SERPL CALCULATED.3IONS-SCNC: 14 MMOL/L (ref 9–17)
ANION GAP SERPL CALCULATED.3IONS-SCNC: 15 MMOL/L (ref 9–17)
ARTERIAL PATENCY WRIST A: YES
AST SERPL-CCNC: 49 U/L (ref 15–37)
AST SERPL-CCNC: 55 U/L (ref 15–37)
BANDS: 15 %
BANDS: 6 %
BANDS: 8 %
BASOPHILS # BLD: 0 K/UL
BASOPHILS NFR BLD: 0 % (ref 0–1)
BDY SITE: ABNORMAL
BILIRUB SERPL-MCNC: 1.7 MG/DL (ref 0–1)
BILIRUB SERPL-MCNC: 2 MG/DL (ref 0–1)
BODY TEMPERATURE: 37
BUN SERPL-MCNC: 51 MG/DL (ref 7–20)
BUN SERPL-MCNC: 57 MG/DL (ref 7–20)
CALCIUM SERPL-MCNC: 8.2 MG/DL (ref 8.3–10.6)
CALCIUM SERPL-MCNC: 8.3 MG/DL (ref 8.3–10.6)
CHLORIDE SERPL-SCNC: 113 MMOL/L (ref 99–110)
CHLORIDE SERPL-SCNC: 113 MMOL/L (ref 99–110)
CO2 SERPL-SCNC: 18 MMOL/L (ref 21–32)
CO2 SERPL-SCNC: 19 MMOL/L (ref 21–32)
COHGB MFR BLD: 0.4 % (ref 0.5–1.5)
CREAT SERPL-MCNC: 4.2 MG/DL (ref 0.8–1.3)
CREAT SERPL-MCNC: 4.3 MG/DL (ref 0.8–1.3)
EOSINOPHIL # BLD: 0 K/UL
EOSINOPHILS RELATIVE PERCENT: 0 % (ref 0–3)
ERYTHROCYTE [DISTWIDTH] IN BLOOD BY AUTOMATED COUNT: 15 % (ref 11.7–14.9)
ERYTHROCYTE [DISTWIDTH] IN BLOOD BY AUTOMATED COUNT: 15.3 % (ref 11.7–14.9)
ERYTHROCYTE [DISTWIDTH] IN BLOOD BY AUTOMATED COUNT: 15.4 % (ref 11.7–14.9)
GAS FLOW.O2 O2 DELIVERY SYS: ABNORMAL L/MIN
GFR, ESTIMATED: 14 ML/MIN/1.73M2
GFR, ESTIMATED: 14 ML/MIN/1.73M2
GLUCOSE SERPL-MCNC: 87 MG/DL (ref 74–99)
GLUCOSE SERPL-MCNC: 95 MG/DL (ref 74–99)
HCO3 ARTERIAL: 20.1 MMOL/L (ref 21–28)
HCT VFR BLD AUTO: 40.3 % (ref 42–52)
HCT VFR BLD AUTO: 41.1 % (ref 42–52)
HCT VFR BLD AUTO: 46.2 % (ref 42–52)
HGB BLD-MCNC: 12.7 G/DL (ref 13.5–18)
HGB BLD-MCNC: 12.7 G/DL (ref 13.5–18)
HGB BLD-MCNC: 14.5 G/DL (ref 13.5–18)
INR PPP: 1.3
LACTATE BLDV-SCNC: 1.7 MMOL/L (ref 0.4–2)
LACTATE BLDV-SCNC: 2.1 MMOL/L (ref 0.4–2)
LACTATE BLDV-SCNC: 2.5 MMOL/L (ref 0.4–2)
LYMPHOCYTES NFR BLD: 0.58 K/UL
LYMPHOCYTES NFR BLD: 1.27 K/UL
LYMPHOCYTES NFR BLD: 1.52 K/UL
LYMPHOCYTES RELATIVE PERCENT: 2 % (ref 24–44)
LYMPHOCYTES RELATIVE PERCENT: 4 % (ref 24–44)
LYMPHOCYTES RELATIVE PERCENT: 4 % (ref 24–44)
MAGNESIUM SERPL-MCNC: 1.9 MG/DL (ref 1.8–2.4)
MAGNESIUM SERPL-MCNC: 1.9 MG/DL (ref 1.8–2.4)
MCH RBC QN AUTO: 30.2 PG (ref 27–31)
MCH RBC QN AUTO: 30.4 PG (ref 27–31)
MCH RBC QN AUTO: 31 PG (ref 27–31)
MCHC RBC AUTO-ENTMCNC: 30.9 G/DL (ref 32–36)
MCHC RBC AUTO-ENTMCNC: 31.4 G/DL (ref 32–36)
MCHC RBC AUTO-ENTMCNC: 31.5 G/DL (ref 32–36)
MCV RBC AUTO: 96.9 FL (ref 78–100)
MCV RBC AUTO: 97.6 FL (ref 78–100)
MCV RBC AUTO: 98.3 FL (ref 78–100)
METAMYELOCYTES ABSOLUTE COUNT: 0.38 K/UL
METAMYELOCYTES: 1 %
METHEMOGLOBIN: 0.6 % (ref 0.5–1.5)
MONOCYTES NFR BLD: 1.59 K/UL
MONOCYTES NFR BLD: 1.73 K/UL
MONOCYTES NFR BLD: 1.9 K/UL
MONOCYTES NFR BLD: 5 % (ref 0–5)
MONOCYTES NFR BLD: 5 % (ref 0–5)
MONOCYTES NFR BLD: 6 % (ref 0–5)
NEGATIVE BASE EXCESS, ART: 5.5 MMOL/L (ref 0–3)
NEUTROPHILS NFR BLD: 75 % (ref 36–66)
NEUTROPHILS NFR BLD: 84 % (ref 36–66)
NEUTROPHILS NFR BLD: 85 % (ref 36–66)
NEUTS SEG NFR BLD: 24.28 K/UL
NEUTS SEG NFR BLD: 26.95 K/UL
NEUTS SEG NFR BLD: 28.5 K/UL
OXYHGB MFR BLD: 88 %
PCO2 ARTERIAL: 39.7 MMHG (ref 35–48)
PH ARTERIAL: 7.32 (ref 7.35–7.45)
PLATELET # BLD AUTO: 165 K/UL (ref 140–440)
PLATELET # BLD AUTO: 168 K/UL (ref 140–440)
PLATELET # BLD AUTO: 194 K/UL (ref 140–440)
PLATELET ESTIMATE: NORMAL
PMV BLD AUTO: 11.3 FL (ref 7.5–11.1)
PMV BLD AUTO: 11.9 FL (ref 7.5–11.1)
PMV BLD AUTO: 12 FL (ref 7.5–11.1)
PO2 ARTERIAL: 57.9 MMHG (ref 83–108)
POTASSIUM SERPL-SCNC: 4.8 MMOL/L (ref 3.5–5.1)
POTASSIUM SERPL-SCNC: 5.2 MMOL/L (ref 3.5–5.1)
PROT SERPL-MCNC: 5.7 G/DL (ref 6.4–8.2)
PROT SERPL-MCNC: 5.8 G/DL (ref 6.4–8.2)
PROTHROMBIN TIME: 16.6 SEC (ref 11.7–14.5)
RBC # BLD AUTO: 4.1 M/UL (ref 4.6–6.2)
RBC # BLD AUTO: 4.21 M/UL (ref 4.6–6.2)
RBC # BLD AUTO: 4.77 M/UL (ref 4.6–6.2)
RBC # BLD: NORMAL 10*6/UL
SODIUM SERPL-SCNC: 145 MMOL/L (ref 136–145)
SODIUM SERPL-SCNC: 146 MMOL/L (ref 136–145)
WBC # BLD: ABNORMAL 10*3/UL
WBC # BLD: ABNORMAL 10*3/UL
WBC # BLD: NORMAL 10*3/UL
WBC OTHER # BLD: 28.9 K/UL (ref 4–10.5)
WBC OTHER # BLD: 31.7 K/UL (ref 4–10.5)
WBC OTHER # BLD: 38 K/UL (ref 4–10.5)

## 2025-05-24 PROCEDURE — 6360000002 HC RX W HCPCS: Performed by: STUDENT IN AN ORGANIZED HEALTH CARE EDUCATION/TRAINING PROGRAM

## 2025-05-24 PROCEDURE — 36415 COLL VENOUS BLD VENIPUNCTURE: CPT

## 2025-05-24 PROCEDURE — 70450 CT HEAD/BRAIN W/O DYE: CPT

## 2025-05-24 PROCEDURE — 2700000000 HC OXYGEN THERAPY PER DAY

## 2025-05-24 PROCEDURE — 6370000000 HC RX 637 (ALT 250 FOR IP): Performed by: STUDENT IN AN ORGANIZED HEALTH CARE EDUCATION/TRAINING PROGRAM

## 2025-05-24 PROCEDURE — 94640 AIRWAY INHALATION TREATMENT: CPT

## 2025-05-24 PROCEDURE — 85610 PROTHROMBIN TIME: CPT

## 2025-05-24 PROCEDURE — 87040 BLOOD CULTURE FOR BACTERIA: CPT

## 2025-05-24 PROCEDURE — 80053 COMPREHEN METABOLIC PANEL: CPT

## 2025-05-24 PROCEDURE — 2060000000 HC ICU INTERMEDIATE R&B

## 2025-05-24 PROCEDURE — 85025 COMPLETE CBC W/AUTO DIFF WBC: CPT

## 2025-05-24 PROCEDURE — 94761 N-INVAS EAR/PLS OXIMETRY MLT: CPT

## 2025-05-24 PROCEDURE — 93005 ELECTROCARDIOGRAM TRACING: CPT | Performed by: STUDENT IN AN ORGANIZED HEALTH CARE EDUCATION/TRAINING PROGRAM

## 2025-05-24 PROCEDURE — 51798 US URINE CAPACITY MEASURE: CPT

## 2025-05-24 PROCEDURE — 2500000003 HC RX 250 WO HCPCS: Performed by: STUDENT IN AN ORGANIZED HEALTH CARE EDUCATION/TRAINING PROGRAM

## 2025-05-24 PROCEDURE — 51702 INSERT TEMP BLADDER CATH: CPT

## 2025-05-24 PROCEDURE — 2580000003 HC RX 258: Performed by: STUDENT IN AN ORGANIZED HEALTH CARE EDUCATION/TRAINING PROGRAM

## 2025-05-24 PROCEDURE — 83605 ASSAY OF LACTIC ACID: CPT

## 2025-05-24 PROCEDURE — 83735 ASSAY OF MAGNESIUM: CPT

## 2025-05-24 PROCEDURE — 82805 BLOOD GASES W/O2 SATURATION: CPT

## 2025-05-24 RX ORDER — SODIUM CHLORIDE 450 MG/100ML
INJECTION, SOLUTION INTRAVENOUS CONTINUOUS
Status: DISCONTINUED | OUTPATIENT
Start: 2025-05-24 | End: 2025-05-24

## 2025-05-24 RX ORDER — MIDODRINE HYDROCHLORIDE 5 MG/1
10 TABLET ORAL ONCE
Status: COMPLETED | OUTPATIENT
Start: 2025-05-24 | End: 2025-05-24

## 2025-05-24 RX ORDER — SODIUM CHLORIDE 9 MG/ML
INJECTION, SOLUTION INTRAVENOUS CONTINUOUS
Status: DISCONTINUED | OUTPATIENT
Start: 2025-05-24 | End: 2025-05-24

## 2025-05-24 RX ORDER — TAMSULOSIN HYDROCHLORIDE 0.4 MG/1
0.4 CAPSULE ORAL DAILY
Status: DISCONTINUED | OUTPATIENT
Start: 2025-05-24 | End: 2025-05-29 | Stop reason: HOSPADM

## 2025-05-24 RX ORDER — SODIUM CHLORIDE 9 MG/ML
INJECTION, SOLUTION INTRAVENOUS CONTINUOUS
Status: ACTIVE | OUTPATIENT
Start: 2025-05-24 | End: 2025-05-25

## 2025-05-24 RX ORDER — SODIUM CHLORIDE, SODIUM LACTATE, POTASSIUM CHLORIDE, AND CALCIUM CHLORIDE .6; .31; .03; .02 G/100ML; G/100ML; G/100ML; G/100ML
1000 INJECTION, SOLUTION INTRAVENOUS ONCE
Status: COMPLETED | OUTPATIENT
Start: 2025-05-24 | End: 2025-05-24

## 2025-05-24 RX ORDER — METOPROLOL TARTRATE 1 MG/ML
2.5 INJECTION, SOLUTION INTRAVENOUS
Status: COMPLETED | OUTPATIENT
Start: 2025-05-24 | End: 2025-05-24

## 2025-05-24 RX ADMIN — SODIUM CHLORIDE: 0.9 INJECTION, SOLUTION INTRAVENOUS at 18:45

## 2025-05-24 RX ADMIN — SODIUM CHLORIDE 1000 ML: 0.9 INJECTION, SOLUTION INTRAVENOUS at 00:15

## 2025-05-24 RX ADMIN — SODIUM CHLORIDE: 0.9 INJECTION, SOLUTION INTRAVENOUS at 03:21

## 2025-05-24 RX ADMIN — SODIUM CHLORIDE, SODIUM LACTATE, POTASSIUM CHLORIDE, AND CALCIUM CHLORIDE 1000 ML: .6; .31; .03; .02 INJECTION, SOLUTION INTRAVENOUS at 12:41

## 2025-05-24 RX ADMIN — PIPERACILLIN AND TAZOBACTAM 3375 MG: 3; .375 INJECTION, POWDER, LYOPHILIZED, FOR SOLUTION INTRAVENOUS at 05:44

## 2025-05-24 RX ADMIN — SODIUM ZIRCONIUM CYCLOSILICATE 10 G: 10 POWDER, FOR SUSPENSION ORAL at 12:48

## 2025-05-24 RX ADMIN — PIPERACILLIN AND TAZOBACTAM 3375 MG: 3; .375 INJECTION, POWDER, LYOPHILIZED, FOR SOLUTION INTRAVENOUS at 22:56

## 2025-05-24 RX ADMIN — MIDODRINE HYDROCHLORIDE 10 MG: 5 TABLET ORAL at 16:03

## 2025-05-24 RX ADMIN — DEXTROSE 150 MG: 50 INJECTION, SOLUTION INTRAVENOUS at 18:37

## 2025-05-24 RX ADMIN — METOPROLOL TARTRATE 2.5 MG: 5 INJECTION INTRAVENOUS at 16:03

## 2025-05-24 RX ADMIN — AMIODARONE HYDROCHLORIDE 1 MG/MIN: 50 INJECTION, SOLUTION INTRAVENOUS at 18:56

## 2025-05-24 RX ADMIN — BUDESONIDE AND FORMOTEROL FUMARATE DIHYDRATE 2 PUFF: 160; 4.5 AEROSOL RESPIRATORY (INHALATION) at 08:39

## 2025-05-24 RX ADMIN — CLOPIDOGREL BISULFATE 75 MG: 75 TABLET, FILM COATED ORAL at 10:55

## 2025-05-24 RX ADMIN — PIPERACILLIN AND TAZOBACTAM 3375 MG: 3; .375 INJECTION, POWDER, LYOPHILIZED, FOR SOLUTION INTRAVENOUS at 13:53

## 2025-05-24 RX ADMIN — BUDESONIDE AND FORMOTEROL FUMARATE DIHYDRATE 2 PUFF: 160; 4.5 AEROSOL RESPIRATORY (INHALATION) at 21:24

## 2025-05-24 RX ADMIN — PIPERACILLIN AND TAZOBACTAM 3375 MG: 3; .375 INJECTION, POWDER, LYOPHILIZED, FOR SOLUTION INTRAVENOUS at 02:06

## 2025-05-24 RX ADMIN — SODIUM CHLORIDE: 0.9 INJECTION, SOLUTION INTRAVENOUS at 05:46

## 2025-05-24 RX ADMIN — SODIUM ZIRCONIUM CYCLOSILICATE 10 G: 10 POWDER, FOR SUSPENSION ORAL at 22:53

## 2025-05-24 RX ADMIN — SODIUM CHLORIDE: 4.5 INJECTION, SOLUTION INTRAVENOUS at 11:45

## 2025-05-24 RX ADMIN — QUETIAPINE FUMARATE 50 MG: 25 TABLET ORAL at 22:53

## 2025-05-24 RX ADMIN — ENOXAPARIN SODIUM 30 MG: 100 INJECTION SUBCUTANEOUS at 10:55

## 2025-05-24 RX ADMIN — ROSUVASTATIN CALCIUM 10 MG: 5 TABLET, FILM COATED ORAL at 22:53

## 2025-05-24 RX ADMIN — ENOXAPARIN SODIUM 30 MG: 100 INJECTION SUBCUTANEOUS at 22:53

## 2025-05-24 RX ADMIN — ACETAMINOPHEN 650 MG: 650 SUPPOSITORY RECTAL at 00:13

## 2025-05-24 ASSESSMENT — PAIN SCALES - GENERAL
PAINLEVEL_OUTOF10: 0

## 2025-05-24 NOTE — H&P
History and Physical      Name:  Randy Martinez /Age/Sex: 1955  (69 y.o. male)   MRN & CSN:  [id number] & [id number] Encounter Date/Time: 2025 10:28 PM EDT   Location:  -A PCP: Kalani Liu MD       Hospital Day: 1    Assessment and Plan:     Patient is a 69 y.o. male who presented with Fall       Severe Sepsis 2/2 Pyelo   Acute Metabolic Encephalopathy   MARGARITA  - On admit, hemodynamically stable, febrile 102.5, leukocytosis of 26. LA 1.8.   - UA suggestive of infection, CT with possible pyelo   - Received IVF and Rocephin in ED    - Continue Zosyn  - Follow-up cultures  - IVF    MARGARITA with hydro   -Cr 2.7 on admit (baseline ~1.1)  -Likely pre-renal from end organ from sepsis and post-obstructive due to BPH (CT abdomen shows enlarged prostate with bilateral hydro), s/p hayes catheter in ED with ~1700 UoP    -IVF  -Avoid nephrotoxin  -Monitor I/O  -Bladder scan       Hx of CVA with residual aphasia   -unknown LKN, NIH of 1 (chronic residual aphasia). CTH shows Evolving left MCA territory infarct with the suggestion of developing laminar necrosis in the affected cortex. Continue with serial NIH, plavix, statin, monitor on tele and MRI in AM      HTN  -Hold BP meds     HLD  -Continue statin     Mood Disorder   -Continue home meds    COPD  -Continue home inhalers     Checklist:  Advanced directive: full  Diet: cardiac   DVT ppx: Lovenox       Disposition: admit to inpatient.  Estimated discharge: 4 day(s).  Current living situation: home.  Expected disposition:home.    Spoke with ED provider who recommended admission for the patient and I agree with that plan.  Personally reviewed lab studies and imaging.  EKG interpreted personally and results as stated above.  Imaging that was interpreted personally and results as stated above.    History of Present Illness:     Chief Complaint: Fall     Patient is a 69 y.o. male with a PMHx as above who presented to the ED with fall. Patient has a recent Left

## 2025-05-25 LAB
ALBUMIN SERPL-MCNC: 2.3 G/DL (ref 3.4–5)
ALBUMIN/GLOB SERPL: 0.7 {RATIO} (ref 1.1–2.2)
ALP SERPL-CCNC: 82 U/L (ref 40–129)
ALT SERPL-CCNC: 32 U/L (ref 10–40)
ANION GAP SERPL CALCULATED.3IONS-SCNC: 13 MMOL/L (ref 9–17)
AST SERPL-CCNC: 59 U/L (ref 15–37)
BASOPHILS # BLD: 0.04 K/UL
BASOPHILS NFR BLD: 0 % (ref 0–1)
BILIRUB SERPL-MCNC: 1 MG/DL (ref 0–1)
BUN SERPL-MCNC: 67 MG/DL (ref 7–20)
CALCIUM SERPL-MCNC: 8.2 MG/DL (ref 8.3–10.6)
CHLORIDE SERPL-SCNC: 114 MMOL/L (ref 99–110)
CO2 SERPL-SCNC: 17 MMOL/L (ref 21–32)
CREAT SERPL-MCNC: 4.1 MG/DL (ref 0.8–1.3)
EOSINOPHIL # BLD: 0.15 K/UL
EOSINOPHILS RELATIVE PERCENT: 1 % (ref 0–3)
ERYTHROCYTE [DISTWIDTH] IN BLOOD BY AUTOMATED COUNT: 15.4 % (ref 11.7–14.9)
GFR, ESTIMATED: 15 ML/MIN/1.73M2
GLUCOSE SERPL-MCNC: 89 MG/DL (ref 74–99)
HCT VFR BLD AUTO: 38.4 % (ref 42–52)
HGB BLD-MCNC: 12.1 G/DL (ref 13.5–18)
IMM GRANULOCYTES # BLD AUTO: 0.16 K/UL
IMM GRANULOCYTES NFR BLD: 1 %
LYMPHOCYTES NFR BLD: 1.05 K/UL
LYMPHOCYTES RELATIVE PERCENT: 5 % (ref 24–44)
MCH RBC QN AUTO: 30.3 PG (ref 27–31)
MCHC RBC AUTO-ENTMCNC: 31.5 G/DL (ref 32–36)
MCV RBC AUTO: 96 FL (ref 78–100)
MICROORGANISM SPEC CULT: ABNORMAL
MONOCYTES NFR BLD: 0.81 K/UL
MONOCYTES NFR BLD: 4 % (ref 0–5)
NEUTROPHILS NFR BLD: 90 % (ref 36–66)
NEUTS SEG NFR BLD: 20.65 K/UL
PLATELET # BLD AUTO: 153 K/UL (ref 140–440)
PMV BLD AUTO: 11.9 FL (ref 7.5–11.1)
POTASSIUM SERPL-SCNC: 4.2 MMOL/L (ref 3.5–5.1)
PROT SERPL-MCNC: 5.9 G/DL (ref 6.4–8.2)
RBC # BLD AUTO: 4 M/UL (ref 4.6–6.2)
SODIUM SERPL-SCNC: 144 MMOL/L (ref 136–145)
SPECIMEN DESCRIPTION: ABNORMAL
WBC OTHER # BLD: 22.9 K/UL (ref 4–10.5)

## 2025-05-25 PROCEDURE — 94640 AIRWAY INHALATION TREATMENT: CPT

## 2025-05-25 PROCEDURE — 2580000003 HC RX 258: Performed by: STUDENT IN AN ORGANIZED HEALTH CARE EDUCATION/TRAINING PROGRAM

## 2025-05-25 PROCEDURE — 85025 COMPLETE CBC W/AUTO DIFF WBC: CPT

## 2025-05-25 PROCEDURE — 2060000000 HC ICU INTERMEDIATE R&B

## 2025-05-25 PROCEDURE — 94761 N-INVAS EAR/PLS OXIMETRY MLT: CPT

## 2025-05-25 PROCEDURE — 80053 COMPREHEN METABOLIC PANEL: CPT

## 2025-05-25 PROCEDURE — 6370000000 HC RX 637 (ALT 250 FOR IP): Performed by: STUDENT IN AN ORGANIZED HEALTH CARE EDUCATION/TRAINING PROGRAM

## 2025-05-25 PROCEDURE — 36415 COLL VENOUS BLD VENIPUNCTURE: CPT

## 2025-05-25 PROCEDURE — 6360000002 HC RX W HCPCS: Performed by: STUDENT IN AN ORGANIZED HEALTH CARE EDUCATION/TRAINING PROGRAM

## 2025-05-25 PROCEDURE — 99223 1ST HOSP IP/OBS HIGH 75: CPT | Performed by: INTERNAL MEDICINE

## 2025-05-25 RX ORDER — METOPROLOL SUCCINATE 50 MG/1
50 TABLET, EXTENDED RELEASE ORAL DAILY
Status: DISCONTINUED | OUTPATIENT
Start: 2025-05-25 | End: 2025-05-29

## 2025-05-25 RX ADMIN — PIPERACILLIN AND TAZOBACTAM 3375 MG: 3; .375 INJECTION, POWDER, LYOPHILIZED, FOR SOLUTION INTRAVENOUS at 21:03

## 2025-05-25 RX ADMIN — CLOPIDOGREL BISULFATE 75 MG: 75 TABLET, FILM COATED ORAL at 10:01

## 2025-05-25 RX ADMIN — AMIODARONE HYDROCHLORIDE 0.5 MG/MIN: 50 INJECTION, SOLUTION INTRAVENOUS at 00:32

## 2025-05-25 RX ADMIN — BUDESONIDE AND FORMOTEROL FUMARATE DIHYDRATE 2 PUFF: 160; 4.5 AEROSOL RESPIRATORY (INHALATION) at 19:07

## 2025-05-25 RX ADMIN — SODIUM ZIRCONIUM CYCLOSILICATE 10 G: 10 POWDER, FOR SUSPENSION ORAL at 10:01

## 2025-05-25 RX ADMIN — METOPROLOL SUCCINATE 50 MG: 50 TABLET, EXTENDED RELEASE ORAL at 12:06

## 2025-05-25 RX ADMIN — ENOXAPARIN SODIUM 30 MG: 100 INJECTION SUBCUTANEOUS at 21:04

## 2025-05-25 RX ADMIN — AMIODARONE HYDROCHLORIDE 0.5 MG/MIN: 50 INJECTION, SOLUTION INTRAVENOUS at 15:56

## 2025-05-25 RX ADMIN — QUETIAPINE FUMARATE 50 MG: 25 TABLET ORAL at 21:03

## 2025-05-25 RX ADMIN — PIPERACILLIN AND TAZOBACTAM 3375 MG: 3; .375 INJECTION, POWDER, LYOPHILIZED, FOR SOLUTION INTRAVENOUS at 12:05

## 2025-05-25 RX ADMIN — ENOXAPARIN SODIUM 30 MG: 100 INJECTION SUBCUTANEOUS at 10:01

## 2025-05-25 RX ADMIN — ROSUVASTATIN CALCIUM 10 MG: 5 TABLET, FILM COATED ORAL at 21:03

## 2025-05-25 RX ADMIN — PIPERACILLIN AND TAZOBACTAM 3375 MG: 3; .375 INJECTION, POWDER, LYOPHILIZED, FOR SOLUTION INTRAVENOUS at 05:40

## 2025-05-25 ASSESSMENT — PAIN SCALES - GENERAL
PAINLEVEL_OUTOF10: 0
PAINLEVEL_OUTOF10: 5
PAINLEVEL_OUTOF10: 0
PAINLEVEL_OUTOF10: 0

## 2025-05-25 NOTE — FLOWSHEET NOTE
RR- RN rounded on pt for DI score of 69.  PT in bed resting quietly with eyes closed.  PT Vitals better B/P is 116/79, , 95% on Room air.  Pt does not appear to be in any distress.  No needs at this time.

## 2025-05-26 ENCOUNTER — RESULTS FOLLOW-UP (OUTPATIENT)
Dept: EMERGENCY DEPT | Age: 70
End: 2025-05-26

## 2025-05-26 LAB
ALBUMIN SERPL-MCNC: 2.3 G/DL (ref 3.4–5)
ALBUMIN/GLOB SERPL: 0.6 {RATIO} (ref 1.1–2.2)
ALBUMIN: 2.4 G/DL (ref 3.4–5)
ALP SERPL-CCNC: 145 U/L (ref 40–129)
ALT SERPL-CCNC: 67 U/L (ref 10–40)
ANION GAP SERPL CALCULATED.3IONS-SCNC: 12 MMOL/L (ref 9–17)
ANION GAP SERPL CALCULATED.3IONS-SCNC: 13 MMOL/L (ref 9–17)
ARTERIAL PATENCY WRIST A: ABNORMAL
AST SERPL-CCNC: 118 U/L (ref 15–37)
BACTERIA URNS QL MICRO: ABNORMAL
BASOPHILS # BLD: 0.04 K/UL
BASOPHILS NFR BLD: 0 % (ref 0–1)
BILIRUB SERPL-MCNC: 1.2 MG/DL (ref 0–1)
BILIRUB UR QL STRIP: NEGATIVE
BODY TEMPERATURE: 37
BUN SERPL-MCNC: 70 MG/DL (ref 7–20)
BUN SERPL-MCNC: 70 MG/DL (ref 7–20)
CALCIUM SERPL-MCNC: 8.2 MG/DL (ref 8.3–10.6)
CALCIUM SERPL-MCNC: 8.3 MG/DL (ref 8.3–10.6)
CHLORIDE SERPL-SCNC: 113 MMOL/L (ref 99–110)
CHLORIDE SERPL-SCNC: 113 MMOL/L (ref 99–110)
CK SERPL-CCNC: 213 U/L (ref 26–192)
CLARITY UR: ABNORMAL
CO2 SERPL-SCNC: 14 MMOL/L (ref 21–32)
CO2 SERPL-SCNC: 17 MMOL/L (ref 21–32)
COHGB MFR BLD: 0.4 % (ref 0.5–1.5)
COLOR UR: YELLOW
CREAT SERPL-MCNC: 4 MG/DL (ref 0.8–1.3)
CREAT SERPL-MCNC: 4.1 MG/DL (ref 0.8–1.3)
CREAT UR-MCNC: 86.3 MG/DL (ref 39–259)
CRYSTALS URNS MICRO: ABNORMAL /HPF
CRYSTALS URNS MICRO: ABNORMAL /HPF
EKG ATRIAL RATE: 113 BPM
EKG DIAGNOSIS: NORMAL
EKG Q-T INTERVAL: 326 MS
EKG QRS DURATION: 86 MS
EKG QTC CALCULATION (BAZETT): 424 MS
EKG R AXIS: 21 DEGREES
EKG T AXIS: -51 DEGREES
EKG VENTRICULAR RATE: 102 BPM
EOSINOPHIL # BLD: 0.18 K/UL
EOSINOPHILS RELATIVE PERCENT: 1 % (ref 0–3)
ERYTHROCYTE [DISTWIDTH] IN BLOOD BY AUTOMATED COUNT: 15.7 % (ref 11.7–14.9)
GFR, ESTIMATED: 15 ML/MIN/1.73M2
GFR, ESTIMATED: 15 ML/MIN/1.73M2
GLUCOSE BLD-MCNC: 81 MG/DL (ref 74–99)
GLUCOSE SERPL-MCNC: 72 MG/DL (ref 74–99)
GLUCOSE SERPL-MCNC: 75 MG/DL (ref 74–99)
GLUCOSE UR STRIP-MCNC: NEGATIVE MG/DL
HCO3 VENOUS: 18.8 MMOL/L (ref 22–29)
HCT VFR BLD AUTO: 38.3 % (ref 42–52)
HGB BLD-MCNC: 11.8 G/DL (ref 13.5–18)
HGB UR QL STRIP.AUTO: ABNORMAL
IMM GRANULOCYTES # BLD AUTO: 0.11 K/UL
IMM GRANULOCYTES NFR BLD: 1 %
KETONES UR STRIP-MCNC: NEGATIVE MG/DL
LEUKOCYTE ESTERASE UR QL STRIP: ABNORMAL
LYMPHOCYTES NFR BLD: 1.04 K/UL
LYMPHOCYTES RELATIVE PERCENT: 7 % (ref 24–44)
MAGNESIUM SERPL-MCNC: 2.7 MG/DL (ref 1.8–2.4)
MAGNESIUM SERPL-MCNC: 2.8 MG/DL (ref 1.8–2.4)
MCH RBC QN AUTO: 29.6 PG (ref 27–31)
MCHC RBC AUTO-ENTMCNC: 30.8 G/DL (ref 32–36)
MCV RBC AUTO: 96 FL (ref 78–100)
METHEMOGLOBIN: 0.5 % (ref 0.5–1.5)
MICROORGANISM SPEC CULT: ABNORMAL
MONOCYTES NFR BLD: 0.74 K/UL
MONOCYTES NFR BLD: 5 % (ref 0–5)
MUCOUS THREADS URNS QL MICRO: ABNORMAL
NEGATIVE BASE EXCESS, VEN: 7.6 MMOL/L (ref 0–3)
NEUTROPHILS NFR BLD: 86 % (ref 36–66)
NEUTS SEG NFR BLD: 13.23 K/UL
NITRITE UR QL STRIP: NEGATIVE
OXYHGB MFR BLD: 76.1 %
PCO2 VENOUS: 41.7 MM HG (ref 38–54)
PH UR STRIP: 5.5 [PH] (ref 5–8)
PH VENOUS: 7.27 (ref 7.32–7.43)
PHOSPHATE SERPL-MCNC: 3.8 MG/DL (ref 2.5–4.9)
PHOSPHATE SERPL-MCNC: 3.9 MG/DL (ref 2.5–4.9)
PLATELET # BLD AUTO: 157 K/UL (ref 140–440)
PMV BLD AUTO: 12.6 FL (ref 7.5–11.1)
PO2 VENOUS: 45.2 MM HG (ref 23–48)
POTASSIUM SERPL-SCNC: 4.9 MMOL/L (ref 3.5–5.1)
POTASSIUM SERPL-SCNC: 5.1 MMOL/L (ref 3.5–5.1)
PROT SERPL-MCNC: 6.2 G/DL (ref 6.4–8.2)
PROT UR STRIP-MCNC: 100 MG/DL
RBC # BLD AUTO: 3.99 M/UL (ref 4.6–6.2)
RBC #/AREA URNS HPF: 3 /HPF (ref 0–2)
SODIUM SERPL-SCNC: 140 MMOL/L (ref 136–145)
SODIUM SERPL-SCNC: 142 MMOL/L (ref 136–145)
SODIUM UR-SCNC: 57 MMOL/L (ref 40–220)
SP GR UR STRIP: 1.02 (ref 1–1.03)
SPECIMEN DESCRIPTION: ABNORMAL
TOTAL PROTEIN, URINE: 81 MG/DL
URINE TOTAL PROTEIN CREATININE RATIO: 0.94 (ref 0–0.2)
UROBILINOGEN UR STRIP-ACNC: 1 EU/DL (ref 0–1)
WBC #/AREA URNS HPF: 20 /HPF (ref 0–5)
WBC OTHER # BLD: 15.3 K/UL (ref 4–10.5)

## 2025-05-26 PROCEDURE — 93010 ELECTROCARDIOGRAM REPORT: CPT | Performed by: INTERNAL MEDICINE

## 2025-05-26 PROCEDURE — 2580000003 HC RX 258: Performed by: STUDENT IN AN ORGANIZED HEALTH CARE EDUCATION/TRAINING PROGRAM

## 2025-05-26 PROCEDURE — 83735 ASSAY OF MAGNESIUM: CPT

## 2025-05-26 PROCEDURE — 82550 ASSAY OF CK (CPK): CPT

## 2025-05-26 PROCEDURE — 6370000000 HC RX 637 (ALT 250 FOR IP): Performed by: NURSE PRACTITIONER

## 2025-05-26 PROCEDURE — 82570 ASSAY OF URINE CREATININE: CPT

## 2025-05-26 PROCEDURE — 84156 ASSAY OF PROTEIN URINE: CPT

## 2025-05-26 PROCEDURE — 84300 ASSAY OF URINE SODIUM: CPT

## 2025-05-26 PROCEDURE — 81001 URINALYSIS AUTO W/SCOPE: CPT

## 2025-05-26 PROCEDURE — 85025 COMPLETE CBC W/AUTO DIFF WBC: CPT

## 2025-05-26 PROCEDURE — 94761 N-INVAS EAR/PLS OXIMETRY MLT: CPT

## 2025-05-26 PROCEDURE — 99233 SBSQ HOSP IP/OBS HIGH 50: CPT | Performed by: INTERNAL MEDICINE

## 2025-05-26 PROCEDURE — 84100 ASSAY OF PHOSPHORUS: CPT

## 2025-05-26 PROCEDURE — 94640 AIRWAY INHALATION TREATMENT: CPT

## 2025-05-26 PROCEDURE — 80053 COMPREHEN METABOLIC PANEL: CPT

## 2025-05-26 PROCEDURE — 82805 BLOOD GASES W/O2 SATURATION: CPT

## 2025-05-26 PROCEDURE — 80069 RENAL FUNCTION PANEL: CPT

## 2025-05-26 PROCEDURE — 2060000000 HC ICU INTERMEDIATE R&B

## 2025-05-26 PROCEDURE — 6370000000 HC RX 637 (ALT 250 FOR IP): Performed by: STUDENT IN AN ORGANIZED HEALTH CARE EDUCATION/TRAINING PROGRAM

## 2025-05-26 PROCEDURE — 6360000002 HC RX W HCPCS: Performed by: STUDENT IN AN ORGANIZED HEALTH CARE EDUCATION/TRAINING PROGRAM

## 2025-05-26 PROCEDURE — 82962 GLUCOSE BLOOD TEST: CPT

## 2025-05-26 PROCEDURE — 36415 COLL VENOUS BLD VENIPUNCTURE: CPT

## 2025-05-26 RX ORDER — AMIODARONE HYDROCHLORIDE 200 MG/1
200 TABLET ORAL 2 TIMES DAILY
Status: DISCONTINUED | OUTPATIENT
Start: 2025-05-26 | End: 2025-05-28

## 2025-05-26 RX ORDER — DEXTROSE MONOHYDRATE 100 MG/ML
INJECTION, SOLUTION INTRAVENOUS CONTINUOUS PRN
Status: DISCONTINUED | OUTPATIENT
Start: 2025-05-26 | End: 2025-05-29 | Stop reason: HOSPADM

## 2025-05-26 RX ORDER — CIPROFLOXACIN 500 MG/1
500 TABLET, FILM COATED ORAL
Status: DISCONTINUED | OUTPATIENT
Start: 2025-05-27 | End: 2025-05-27

## 2025-05-26 RX ORDER — GLUCAGON 1 MG/ML
1 KIT INJECTION PRN
Status: DISCONTINUED | OUTPATIENT
Start: 2025-05-26 | End: 2025-05-29 | Stop reason: HOSPADM

## 2025-05-26 RX ADMIN — AMIODARONE HYDROCHLORIDE 200 MG: 200 TABLET ORAL at 09:24

## 2025-05-26 RX ADMIN — QUETIAPINE FUMARATE 50 MG: 25 TABLET ORAL at 21:09

## 2025-05-26 RX ADMIN — TAMSULOSIN HYDROCHLORIDE 0.4 MG: 0.4 CAPSULE ORAL at 09:24

## 2025-05-26 RX ADMIN — PIPERACILLIN AND TAZOBACTAM 3375 MG: 3; .375 INJECTION, POWDER, LYOPHILIZED, FOR SOLUTION INTRAVENOUS at 14:49

## 2025-05-26 RX ADMIN — PIPERACILLIN AND TAZOBACTAM 3375 MG: 3; .375 INJECTION, POWDER, LYOPHILIZED, FOR SOLUTION INTRAVENOUS at 21:08

## 2025-05-26 RX ADMIN — BUDESONIDE AND FORMOTEROL FUMARATE DIHYDRATE 2 PUFF: 160; 4.5 AEROSOL RESPIRATORY (INHALATION) at 12:06

## 2025-05-26 RX ADMIN — ENOXAPARIN SODIUM 30 MG: 100 INJECTION SUBCUTANEOUS at 21:24

## 2025-05-26 RX ADMIN — ACETAMINOPHEN 650 MG: 325 TABLET ORAL at 21:09

## 2025-05-26 RX ADMIN — AMIODARONE HYDROCHLORIDE 0.5 MG/MIN: 50 INJECTION, SOLUTION INTRAVENOUS at 05:21

## 2025-05-26 RX ADMIN — METOPROLOL SUCCINATE 50 MG: 50 TABLET, EXTENDED RELEASE ORAL at 09:24

## 2025-05-26 RX ADMIN — CLOPIDOGREL BISULFATE 75 MG: 75 TABLET, FILM COATED ORAL at 09:24

## 2025-05-26 RX ADMIN — BUDESONIDE AND FORMOTEROL FUMARATE DIHYDRATE 2 PUFF: 160; 4.5 AEROSOL RESPIRATORY (INHALATION) at 19:44

## 2025-05-26 RX ADMIN — ROSUVASTATIN CALCIUM 10 MG: 5 TABLET, FILM COATED ORAL at 21:09

## 2025-05-26 RX ADMIN — PIPERACILLIN AND TAZOBACTAM 3375 MG: 3; .375 INJECTION, POWDER, LYOPHILIZED, FOR SOLUTION INTRAVENOUS at 05:14

## 2025-05-26 RX ADMIN — AMIODARONE HYDROCHLORIDE 200 MG: 200 TABLET ORAL at 21:19

## 2025-05-26 RX ADMIN — ENOXAPARIN SODIUM 30 MG: 100 INJECTION SUBCUTANEOUS at 09:24

## 2025-05-26 ASSESSMENT — PAIN DESCRIPTION - DESCRIPTORS: DESCRIPTORS: OTHER (COMMENT)

## 2025-05-26 ASSESSMENT — PAIN DESCRIPTION - LOCATION: LOCATION: OTHER (COMMENT)

## 2025-05-26 ASSESSMENT — PAIN DESCRIPTION - ORIENTATION: ORIENTATION: OTHER (COMMENT)

## 2025-05-26 ASSESSMENT — PAIN SCALES - WONG BAKER: WONGBAKER_NUMERICALRESPONSE: NO HURT

## 2025-05-26 ASSESSMENT — PAIN SCALES - GENERAL: PAINLEVEL_OUTOF10: 0

## 2025-05-27 ENCOUNTER — APPOINTMENT (OUTPATIENT)
Dept: ULTRASOUND IMAGING | Age: 70
DRG: 871 | End: 2025-05-27
Attending: STUDENT IN AN ORGANIZED HEALTH CARE EDUCATION/TRAINING PROGRAM
Payer: MEDICARE

## 2025-05-27 ENCOUNTER — APPOINTMENT (OUTPATIENT)
Dept: MRI IMAGING | Age: 70
DRG: 871 | End: 2025-05-27
Attending: STUDENT IN AN ORGANIZED HEALTH CARE EDUCATION/TRAINING PROGRAM
Payer: MEDICARE

## 2025-05-27 ENCOUNTER — APPOINTMENT (OUTPATIENT)
Dept: NON INVASIVE DIAGNOSTICS | Age: 70
DRG: 871 | End: 2025-05-27
Attending: STUDENT IN AN ORGANIZED HEALTH CARE EDUCATION/TRAINING PROGRAM
Payer: MEDICARE

## 2025-05-27 LAB
ALBUMIN SERPL-MCNC: 2.7 G/DL (ref 3.4–5)
ALBUMIN/GLOB SERPL: 0.7 {RATIO} (ref 1.1–2.2)
ALP SERPL-CCNC: 148 U/L (ref 40–129)
ALT SERPL-CCNC: 64 U/L (ref 10–40)
ANION GAP SERPL CALCULATED.3IONS-SCNC: 12 MMOL/L (ref 9–17)
AST SERPL-CCNC: 105 U/L (ref 15–37)
BILIRUB SERPL-MCNC: 1.1 MG/DL (ref 0–1)
BUN SERPL-MCNC: 69 MG/DL (ref 7–20)
CALCIUM SERPL-MCNC: 8.6 MG/DL (ref 8.3–10.6)
CHLORIDE SERPL-SCNC: 114 MMOL/L (ref 99–110)
CO2 SERPL-SCNC: 18 MMOL/L (ref 21–32)
CREAT SERPL-MCNC: 4.1 MG/DL (ref 0.8–1.3)
GFR, ESTIMATED: 15 ML/MIN/1.73M2
GLUCOSE BLD-MCNC: 73 MG/DL (ref 74–99)
GLUCOSE BLD-MCNC: 93 MG/DL (ref 74–99)
GLUCOSE BLD-MCNC: 97 MG/DL (ref 74–99)
GLUCOSE SERPL-MCNC: 79 MG/DL (ref 74–99)
POTASSIUM SERPL-SCNC: 5 MMOL/L (ref 3.5–5.1)
PROT SERPL-MCNC: 6.4 G/DL (ref 6.4–8.2)
SODIUM SERPL-SCNC: 144 MMOL/L (ref 136–145)

## 2025-05-27 PROCEDURE — 99233 SBSQ HOSP IP/OBS HIGH 50: CPT | Performed by: INTERNAL MEDICINE

## 2025-05-27 PROCEDURE — 94640 AIRWAY INHALATION TREATMENT: CPT

## 2025-05-27 PROCEDURE — 6360000002 HC RX W HCPCS: Performed by: STUDENT IN AN ORGANIZED HEALTH CARE EDUCATION/TRAINING PROGRAM

## 2025-05-27 PROCEDURE — 6370000000 HC RX 637 (ALT 250 FOR IP): Performed by: STUDENT IN AN ORGANIZED HEALTH CARE EDUCATION/TRAINING PROGRAM

## 2025-05-27 PROCEDURE — 2060000000 HC ICU INTERMEDIATE R&B

## 2025-05-27 PROCEDURE — 36415 COLL VENOUS BLD VENIPUNCTURE: CPT

## 2025-05-27 PROCEDURE — 6370000000 HC RX 637 (ALT 250 FOR IP): Performed by: NURSE PRACTITIONER

## 2025-05-27 PROCEDURE — 93971 EXTREMITY STUDY: CPT

## 2025-05-27 PROCEDURE — 70551 MRI BRAIN STEM W/O DYE: CPT

## 2025-05-27 PROCEDURE — APPNB15 APP NON BILLABLE TIME 0-15 MINS

## 2025-05-27 PROCEDURE — 82962 GLUCOSE BLOOD TEST: CPT

## 2025-05-27 PROCEDURE — 80053 COMPREHEN METABOLIC PANEL: CPT

## 2025-05-27 PROCEDURE — 93325 DOPPLER ECHO COLOR FLOW MAPG: CPT

## 2025-05-27 PROCEDURE — 94761 N-INVAS EAR/PLS OXIMETRY MLT: CPT

## 2025-05-27 RX ORDER — LACTOBACILLUS RHAMNOSUS GG 10B CELL
1 CAPSULE ORAL
Status: DISCONTINUED | OUTPATIENT
Start: 2025-05-28 | End: 2025-05-29 | Stop reason: HOSPADM

## 2025-05-27 RX ORDER — LORAZEPAM 0.5 MG/1
0.5 TABLET ORAL
Status: COMPLETED | OUTPATIENT
Start: 2025-05-27 | End: 2025-05-28

## 2025-05-27 RX ORDER — AMOXICILLIN AND CLAVULANATE POTASSIUM 500; 125 MG/1; MG/1
1 TABLET, FILM COATED ORAL EVERY 12 HOURS SCHEDULED
Status: DISCONTINUED | OUTPATIENT
Start: 2025-05-27 | End: 2025-05-29 | Stop reason: HOSPADM

## 2025-05-27 RX ADMIN — ROSUVASTATIN CALCIUM 10 MG: 5 TABLET, FILM COATED ORAL at 20:42

## 2025-05-27 RX ADMIN — QUETIAPINE FUMARATE 50 MG: 25 TABLET ORAL at 20:42

## 2025-05-27 RX ADMIN — AMOXICILLIN AND CLAVULANATE POTASSIUM 1 TABLET: 500; 125 TABLET, FILM COATED ORAL at 20:44

## 2025-05-27 RX ADMIN — BUDESONIDE AND FORMOTEROL FUMARATE DIHYDRATE 2 PUFF: 160; 4.5 AEROSOL RESPIRATORY (INHALATION) at 07:17

## 2025-05-27 RX ADMIN — ENOXAPARIN SODIUM 30 MG: 100 INJECTION SUBCUTANEOUS at 20:43

## 2025-05-27 RX ADMIN — BUDESONIDE AND FORMOTEROL FUMARATE DIHYDRATE 2 PUFF: 160; 4.5 AEROSOL RESPIRATORY (INHALATION) at 19:53

## 2025-05-27 RX ADMIN — ENOXAPARIN SODIUM 30 MG: 100 INJECTION SUBCUTANEOUS at 09:14

## 2025-05-27 RX ADMIN — AMIODARONE HYDROCHLORIDE 200 MG: 200 TABLET ORAL at 09:14

## 2025-05-27 RX ADMIN — AMIODARONE HYDROCHLORIDE 200 MG: 200 TABLET ORAL at 20:41

## 2025-05-27 RX ADMIN — TAMSULOSIN HYDROCHLORIDE 0.4 MG: 0.4 CAPSULE ORAL at 09:14

## 2025-05-27 RX ADMIN — CLOPIDOGREL BISULFATE 75 MG: 75 TABLET, FILM COATED ORAL at 09:14

## 2025-05-27 RX ADMIN — CIPROFLOXACIN HYDROCHLORIDE 500 MG: 500 TABLET, FILM COATED ORAL at 09:14

## 2025-05-27 RX ADMIN — ACETAMINOPHEN 650 MG: 325 TABLET ORAL at 20:41

## 2025-05-27 RX ADMIN — METOPROLOL SUCCINATE 50 MG: 50 TABLET, EXTENDED RELEASE ORAL at 09:14

## 2025-05-27 ASSESSMENT — PAIN SCALES - WONG BAKER: WONGBAKER_NUMERICALRESPONSE: NO HURT

## 2025-05-27 ASSESSMENT — PAIN DESCRIPTION - LOCATION: LOCATION: OTHER (COMMENT)

## 2025-05-27 ASSESSMENT — PAIN DESCRIPTION - ORIENTATION: ORIENTATION: OTHER (COMMENT)

## 2025-05-27 ASSESSMENT — PAIN SCALES - GENERAL: PAINLEVEL_OUTOF10: 0

## 2025-05-27 ASSESSMENT — PAIN DESCRIPTION - DESCRIPTORS: DESCRIPTORS: OTHER (COMMENT)

## 2025-05-28 PROBLEM — I63.512 ACUTE ISCHEMIC LEFT MCA STROKE (HCC): Status: ACTIVE | Noted: 2025-05-28

## 2025-05-28 PROBLEM — I48.0 PAROXYSMAL ATRIAL FIBRILLATION (HCC): Status: ACTIVE | Noted: 2025-05-28

## 2025-05-28 PROBLEM — R47.01 EXPRESSIVE APHASIA: Status: ACTIVE | Noted: 2025-05-28

## 2025-05-28 LAB
ALBUMIN SERPL-MCNC: 2.7 G/DL (ref 3.4–5)
ALBUMIN/GLOB SERPL: 0.7 {RATIO} (ref 1.1–2.2)
ALP SERPL-CCNC: 144 U/L (ref 40–129)
ALT SERPL-CCNC: 54 U/L (ref 10–40)
ANION GAP SERPL CALCULATED.3IONS-SCNC: 11 MMOL/L (ref 9–17)
ANTI-XA UNFRAC HEPARIN: 0.42 IU/L
AST SERPL-CCNC: 77 U/L (ref 15–37)
BACTERIA URNS QL MICRO: ABNORMAL
BILIRUB SERPL-MCNC: 0.7 MG/DL (ref 0–1)
BILIRUB UR QL STRIP: NEGATIVE
BUN SERPL-MCNC: 64 MG/DL (ref 7–20)
C3 SERPL-MCNC: 142 MG/DL (ref 90–180)
C4 SERPL-MCNC: 32 MG/DL (ref 10–40)
CALCIUM SERPL-MCNC: 9.2 MG/DL (ref 8.3–10.6)
CASTS #/AREA URNS LPF: ABNORMAL /LPF
CASTS #/AREA URNS LPF: ABNORMAL /LPF
CHLORIDE SERPL-SCNC: 117 MMOL/L (ref 99–110)
CLARITY UR: ABNORMAL
CO2 SERPL-SCNC: 19 MMOL/L (ref 21–32)
COLOR UR: YELLOW
CREAT SERPL-MCNC: 4 MG/DL (ref 0.8–1.3)
CREAT UR-MCNC: 79.3 MG/DL (ref 39–259)
ECHO BSA: 2.31 M2
ECHO EST RA PRESSURE: 3 MMHG
ECHO IVC PROX: 2.2 CM
ECHO LA AREA 4C: 19.9 CM2
ECHO LA DIAMETER INDEX: 1.66 CM/M2
ECHO LA DIAMETER: 3.6 CM
ECHO LA MAJOR AXIS: 6 CM
ECHO LA VOL MOD A4C: 52 ML (ref 18–58)
ECHO LA VOLUME INDEX MOD A4C: 24 ML/M2 (ref 16–34)
ECHO LV EDV A4C: 102 ML
ECHO LV EDV INDEX A4C: 47 ML/M2
ECHO LV EF PHYSICIAN: 55 %
ECHO LV EJECTION FRACTION A4C: 46 %
ECHO LV ESV A4C: 56 ML
ECHO LV ESV INDEX A4C: 26 ML/M2
ECHO LV FRACTIONAL SHORTENING: 23 % (ref 28–44)
ECHO LV INTERNAL DIMENSION DIASTOLE INDEX: 2.44 CM/M2
ECHO LV INTERNAL DIMENSION DIASTOLIC: 5.3 CM (ref 4.2–5.9)
ECHO LV INTERNAL DIMENSION SYSTOLIC INDEX: 1.89 CM/M2
ECHO LV INTERNAL DIMENSION SYSTOLIC: 4.1 CM
ECHO LV IVSD: 1.2 CM (ref 0.6–1)
ECHO LV MASS 2D: 242 G (ref 88–224)
ECHO LV MASS INDEX 2D: 111.5 G/M2 (ref 49–115)
ECHO LV POSTERIOR WALL DIASTOLIC: 1.1 CM (ref 0.6–1)
ECHO LV RELATIVE WALL THICKNESS RATIO: 0.42
ECHO RIGHT VENTRICULAR SYSTOLIC PRESSURE (RVSP): 10 MMHG
ECHO RV MID DIMENSION: 3.1 CM
ECHO TV REGURGITANT MAX VELOCITY: 1.36 M/S
ECHO TV REGURGITANT PEAK GRADIENT: 7 MMHG
GFR, ESTIMATED: 15 ML/MIN/1.73M2
GLUCOSE BLD-MCNC: 100 MG/DL (ref 74–99)
GLUCOSE BLD-MCNC: 106 MG/DL (ref 74–99)
GLUCOSE BLD-MCNC: 82 MG/DL (ref 74–99)
GLUCOSE BLD-MCNC: 87 MG/DL (ref 74–99)
GLUCOSE SERPL-MCNC: 91 MG/DL (ref 74–99)
GLUCOSE UR STRIP-MCNC: NEGATIVE MG/DL
HGB UR QL STRIP.AUTO: ABNORMAL
INR PPP: 1.2
KETONES UR STRIP-MCNC: NEGATIVE MG/DL
LDH SERPL-CCNC: 233 U/L (ref 100–190)
LEUKOCYTE ESTERASE UR QL STRIP: ABNORMAL
MAGNESIUM SERPL-MCNC: 2.4 MG/DL (ref 1.8–2.4)
MUCOUS THREADS URNS QL MICRO: ABNORMAL
NITRITE UR QL STRIP: NEGATIVE
PARTIAL THROMBOPLASTIN TIME: 44.7 SEC (ref 25.1–37.1)
PH UR STRIP: 5.5 [PH] (ref 5–8)
PHOSPHATE SERPL-MCNC: 4.3 MG/DL (ref 2.5–4.9)
POTASSIUM SERPL-SCNC: 5 MMOL/L (ref 3.5–5.1)
PROT SERPL-MCNC: 6.3 G/DL (ref 6.4–8.2)
PROT UR STRIP-MCNC: 30 MG/DL
PROTHROMBIN TIME: 15.1 SEC (ref 11.7–14.5)
RBC #/AREA URNS HPF: 20 /HPF (ref 0–2)
SODIUM SERPL-SCNC: 147 MMOL/L (ref 136–145)
SODIUM UR-SCNC: 69 MMOL/L (ref 40–220)
SP GR UR STRIP: 1.02 (ref 1–1.03)
TOTAL PROTEIN, URINE: 58 MG/DL
URINE TOTAL PROTEIN CREATININE RATIO: 0.73 (ref 0–0.2)
UROBILINOGEN UR STRIP-ACNC: 0.2 EU/DL (ref 0–1)
WBC #/AREA URNS HPF: 245 /HPF (ref 0–5)
YEAST URNS QL MICRO: ABNORMAL

## 2025-05-28 PROCEDURE — 84100 ASSAY OF PHOSPHORUS: CPT

## 2025-05-28 PROCEDURE — APPNB60 APP NON BILLABLE TIME 46-60 MINS: Performed by: NURSE PRACTITIONER

## 2025-05-28 PROCEDURE — 93306 TTE W/DOPPLER COMPLETE: CPT | Performed by: INTERNAL MEDICINE

## 2025-05-28 PROCEDURE — 6360000002 HC RX W HCPCS: Performed by: STUDENT IN AN ORGANIZED HEALTH CARE EDUCATION/TRAINING PROGRAM

## 2025-05-28 PROCEDURE — 84156 ASSAY OF PROTEIN URINE: CPT

## 2025-05-28 PROCEDURE — 6370000000 HC RX 637 (ALT 250 FOR IP): Performed by: NURSE PRACTITIONER

## 2025-05-28 PROCEDURE — 85520 HEPARIN ASSAY: CPT

## 2025-05-28 PROCEDURE — 82570 ASSAY OF URINE CREATININE: CPT

## 2025-05-28 PROCEDURE — 6370000000 HC RX 637 (ALT 250 FOR IP): Performed by: STUDENT IN AN ORGANIZED HEALTH CARE EDUCATION/TRAINING PROGRAM

## 2025-05-28 PROCEDURE — 80053 COMPREHEN METABOLIC PANEL: CPT

## 2025-05-28 PROCEDURE — 81001 URINALYSIS AUTO W/SCOPE: CPT

## 2025-05-28 PROCEDURE — 82962 GLUCOSE BLOOD TEST: CPT

## 2025-05-28 PROCEDURE — 2060000000 HC ICU INTERMEDIATE R&B

## 2025-05-28 PROCEDURE — 83615 LACTATE (LD) (LDH) ENZYME: CPT

## 2025-05-28 PROCEDURE — 99232 SBSQ HOSP IP/OBS MODERATE 35: CPT | Performed by: INTERNAL MEDICINE

## 2025-05-28 PROCEDURE — 86160 COMPLEMENT ANTIGEN: CPT

## 2025-05-28 PROCEDURE — 85610 PROTHROMBIN TIME: CPT

## 2025-05-28 PROCEDURE — 84300 ASSAY OF URINE SODIUM: CPT

## 2025-05-28 PROCEDURE — 2500000003 HC RX 250 WO HCPCS: Performed by: STUDENT IN AN ORGANIZED HEALTH CARE EDUCATION/TRAINING PROGRAM

## 2025-05-28 PROCEDURE — 85730 THROMBOPLASTIN TIME PARTIAL: CPT

## 2025-05-28 PROCEDURE — 94761 N-INVAS EAR/PLS OXIMETRY MLT: CPT

## 2025-05-28 PROCEDURE — 94640 AIRWAY INHALATION TREATMENT: CPT

## 2025-05-28 PROCEDURE — 83735 ASSAY OF MAGNESIUM: CPT

## 2025-05-28 PROCEDURE — 36415 COLL VENOUS BLD VENIPUNCTURE: CPT

## 2025-05-28 PROCEDURE — 99223 1ST HOSP IP/OBS HIGH 75: CPT | Performed by: STUDENT IN AN ORGANIZED HEALTH CARE EDUCATION/TRAINING PROGRAM

## 2025-05-28 RX ORDER — HEPARIN SODIUM 10000 [USP'U]/100ML
5-30 INJECTION, SOLUTION INTRAVENOUS CONTINUOUS
Status: DISCONTINUED | OUTPATIENT
Start: 2025-05-28 | End: 2025-05-29

## 2025-05-28 RX ORDER — HEPARIN SODIUM 1000 [USP'U]/ML
4000 INJECTION, SOLUTION INTRAVENOUS; SUBCUTANEOUS ONCE
Status: COMPLETED | OUTPATIENT
Start: 2025-05-28 | End: 2025-05-28

## 2025-05-28 RX ORDER — HEPARIN SODIUM 1000 [USP'U]/ML
2000 INJECTION, SOLUTION INTRAVENOUS; SUBCUTANEOUS PRN
Status: DISCONTINUED | OUTPATIENT
Start: 2025-05-28 | End: 2025-05-29

## 2025-05-28 RX ORDER — HEPARIN SODIUM 1000 [USP'U]/ML
4000 INJECTION, SOLUTION INTRAVENOUS; SUBCUTANEOUS PRN
Status: DISCONTINUED | OUTPATIENT
Start: 2025-05-28 | End: 2025-05-29

## 2025-05-28 RX ORDER — AMIODARONE HYDROCHLORIDE 200 MG/1
200 TABLET ORAL DAILY
Status: DISCONTINUED | OUTPATIENT
Start: 2025-05-29 | End: 2025-05-29 | Stop reason: HOSPADM

## 2025-05-28 RX ADMIN — QUETIAPINE FUMARATE 50 MG: 25 TABLET ORAL at 21:03

## 2025-05-28 RX ADMIN — ROSUVASTATIN CALCIUM 10 MG: 5 TABLET, FILM COATED ORAL at 21:03

## 2025-05-28 RX ADMIN — METOPROLOL SUCCINATE 50 MG: 50 TABLET, EXTENDED RELEASE ORAL at 09:11

## 2025-05-28 RX ADMIN — BUDESONIDE AND FORMOTEROL FUMARATE DIHYDRATE 2 PUFF: 160; 4.5 AEROSOL RESPIRATORY (INHALATION) at 20:30

## 2025-05-28 RX ADMIN — AMIODARONE HYDROCHLORIDE 200 MG: 200 TABLET ORAL at 09:12

## 2025-05-28 RX ADMIN — Medication 1 CAPSULE: at 09:11

## 2025-05-28 RX ADMIN — AMOXICILLIN AND CLAVULANATE POTASSIUM 1 TABLET: 500; 125 TABLET, FILM COATED ORAL at 09:12

## 2025-05-28 RX ADMIN — HEPARIN SODIUM 4000 UNITS: 1000 INJECTION INTRAVENOUS; SUBCUTANEOUS at 16:14

## 2025-05-28 RX ADMIN — AMOXICILLIN AND CLAVULANATE POTASSIUM 1 TABLET: 500; 125 TABLET, FILM COATED ORAL at 21:01

## 2025-05-28 RX ADMIN — CLOPIDOGREL BISULFATE 75 MG: 75 TABLET, FILM COATED ORAL at 09:11

## 2025-05-28 RX ADMIN — BUDESONIDE AND FORMOTEROL FUMARATE DIHYDRATE 2 PUFF: 160; 4.5 AEROSOL RESPIRATORY (INHALATION) at 07:52

## 2025-05-28 RX ADMIN — TAMSULOSIN HYDROCHLORIDE 0.4 MG: 0.4 CAPSULE ORAL at 09:11

## 2025-05-28 RX ADMIN — LORAZEPAM 0.5 MG: 0.5 TABLET ORAL at 21:07

## 2025-05-28 RX ADMIN — ENOXAPARIN SODIUM 30 MG: 100 INJECTION SUBCUTANEOUS at 09:12

## 2025-05-28 RX ADMIN — HEPARIN SODIUM 10 UNITS/KG/HR: 10000 INJECTION, SOLUTION INTRAVENOUS at 16:14

## 2025-05-28 ASSESSMENT — PAIN SCALES - GENERAL
PAINLEVEL_OUTOF10: 0

## 2025-05-28 ASSESSMENT — PAIN SCALES - WONG BAKER
WONGBAKER_NUMERICALRESPONSE: NO HURT

## 2025-05-28 NOTE — DISCHARGE INSTR - COC
Continuity of Care Form    Patient Name: Randy Martinez   :  1955  MRN:  [id number]    Admit date:  2025  Discharge date:  25    Code Status Order: Full Code   Advance Directives:     Admitting Physician:  Patricia Madera MD  PCP: Kalani Liu MD    Discharging Nurse: Britt Pina RN  Discharging Hospital Unit/Room#: -A  Discharging Unit Phone Number: [phone]    Emergency Contact:   Extended Emergency Contact Information  Primary Emergency Contact: Yulia Cullen  Home Phone: [phone]  Work Phone: [phone]  Mobile Phone: [phone]  Relation: Girlfriend  Secondary Emergency Contact: Norma Enriquez  Mobile Phone: [phone]  Relation: Brother/Sister    Past Surgical History:  Past Surgical History:   Procedure Laterality Date    VASCULAR SURGERY      stent L leg        Immunization History:   Immunization History   Administered Date(s) Administered    Influenza Vaccine, unspecified formulation 2017, 10/22/2018    Influenza Virus Vaccine 2017, 10/25/2019    Influenza, AFLURIA (age 3 y+), FLUZONE, (age 6 mo+), Quadv MDV, 0.5mL 10/22/2018    Influenza, FLUBLOK, (age 18 y+), Quadv PF, 0.5mL 2019    Pneumococcal, PPSV23, PNEUMOVAX 23, (age 2y+), SC/IM, 0.5mL 2015, 2019       Active Problems:  Patient Active Problem List   Diagnosis Code    Post concussion syndrome F07.81    Primary hypertension I10    COPD (chronic obstructive pulmonary disease) (HCC) J44.9    Leg edema, left R60.0    PTSD (post-traumatic stress disorder) F43.10    PAD (peripheral artery disease) I73.9    Tobacco abuse Z72.0    Mixed hyperlipidemia E78.2    Elevated PSA R97.20    Allergic rhinitis J30.9    Severe sepsis ([facility]) A41.9, R65.20       Isolation/Infection:   Isolation            No Isolation          Patient Infection Status    No active infections.   Resolved       Infection Onset Added Last Indicated Last Indicated By Resolved Resolved By    MRSA  [id number]

## 2025-05-28 NOTE — CARE COORDINATION
CM reviewed pt’s medical record and discussed pt in IDR. CM introduced self and explained the role of case management. CM in to see pt to initiate D/C planning. Pt is oriented to person. Pt is from [facility] per EMS run sheet.  CM called and spoke with Caron/[facility] (filling in for Shobha/PENELOPE). Caron will verify if pt is SNF vs LTC pt and return call this CM. CM called and spoke to pt's JENA, his sister Plan for discharge is SNF vs LTC at [facility]. Awaiting return call.     05/28/25 1111   Service Assessment   Patient Orientation Alert and Oriented;Person   Cognition Other (see comment)  (Pt has expressive aphasia)   History Provided By Medical Record   Primary Caregiver Other (Comment)  ([facility])   Accompanied By/Relationship NA   Support Systems Other (Comment)  ([facility])   Patient's Healthcare Decision Maker is: Legal Next of Kin   PCP Verified by CM Yes   Last Visit to PCP Within last 3 months  (Pt has PCP and Nursing home medical director)   Prior Functional Level Assistance with the following:  (max assist with all ADLs)   Current Functional Level Assistance with the following:  (max assist with mobility and ADLs)   Can patient return to prior living arrangement Other (see comment)  (Pt is able to return to [facility] but CM waiting on verification of whether pt is SNF vs LTC.)   Ability to make needs known: Other (see comment)  (pt has expressive aphasia)   Family able to assist with home care needs: Other (comment)  ([facility])   Would you like for me to discuss the discharge plan with any other family members/significant others, and if so, who? Yes  (LNOK and family as needed)   Financial Resources Medicaid;Medicare   Community Resources Other (Comment)  (Pt is from [facility])   CM/PENELOPE Referral Other (see comment)  (case management discharge assessment)   Social/Functional History   Lives With Other (Comment)  ([facility]

## 2025-05-28 NOTE — CARE COORDINATION
CM received return call from Kathleen/Teofilo Pina [facility] (filling in for Shobha/PENELOPE), who verified that pt is LTC at their facility. Pt is on a bed hold and able to return when medically ready and has been restraint free for 24-hours. Pt does not require a precert or PAS at discharge.    If pt requires narcotic/controlled medications, a script signed in ink is required for all facilities.     Discharging Nurse: Pt will discharge to [facility].    FAX any Scripts and AVS with completed FATEMEH to [phone]  then place in discharge envelope.     Call report to [phone].     Notify family.    If pt discharges after hours or on the weekend, arrange transportation with [facility] @ [phone]

## 2025-05-28 NOTE — DISCHARGE INSTRUCTIONS
- please schedule an appointment to see your PCP  - please schedule an appointment to see cardiology, neurology, nephrology and urology  - please go to lab in one week for blood work for 4 weeks  - please take medications as prescribed    PCP List:    1. Call to schedule follow up hospital follow up appointment:    [facility]   [address] [address]   Tel: [phone]       [facility]   [address]   [place], [place] [address]   [phone]        [facility]    [address], [place]    [phone]        2. Establish care with a primary care provider   Physician Finder [phone]        [facility]   [address], [address], [place] [id number]   [phone]   MD Nancy Turcios MD Sydney J Dunn, APRN,CNP   Kim Mills APRN, CNP - diabetic nurse practitioner   Lexy Fish APRN, CNP       [facility]      [address] [id number]      [phone]   MiahMD Sarah Willis MD Sandy D. Turner,         [facility]      [address], [address], [place] [id number]      [phone]   MD Montez Smith MD Rachel Regan, MD Beraht R. Thapa, DO Tara Haynes PAMELANI Serrano, APRN, CNP   Evelyn Carter PA-C       [facility]      [address] [id number]      [phone]   MD Kd Guzmán MD       [facility]      [address] [place] [address] [phone]   MD Guzman Elder MD      [facility]      [address] [place] [address] [phone]   MD Davida Aguirre
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
## 2025-05-29 ENCOUNTER — APPOINTMENT (OUTPATIENT)
Dept: CT IMAGING | Age: 70
DRG: 871 | End: 2025-05-29
Attending: STUDENT IN AN ORGANIZED HEALTH CARE EDUCATION/TRAINING PROGRAM
Payer: MEDICARE

## 2025-05-29 VITALS
HEART RATE: 59 BPM | OXYGEN SATURATION: 98 % | DIASTOLIC BLOOD PRESSURE: 83 MMHG | WEIGHT: 211.42 LBS | TEMPERATURE: 98.4 F | RESPIRATION RATE: 18 BRPM | SYSTOLIC BLOOD PRESSURE: 152 MMHG | BODY MASS INDEX: 30.34 KG/M2

## 2025-05-29 LAB
ALBUMIN SERPL-MCNC: 2.9 G/DL (ref 3.4–5)
ALBUMIN/GLOB SERPL: 0.7 {RATIO} (ref 1.1–2.2)
ALP SERPL-CCNC: 145 U/L (ref 40–129)
ALT SERPL-CCNC: 55 U/L (ref 10–40)
ANION GAP SERPL CALCULATED.3IONS-SCNC: 11 MMOL/L (ref 9–17)
ANTI-XA UNFRAC HEPARIN: 0.23 IU/L
ANTI-XA UNFRAC HEPARIN: >1.1 IU/L
AST SERPL-CCNC: 71 U/L (ref 15–37)
BASOPHILS # BLD: 0.04 K/UL
BASOPHILS NFR BLD: 0 % (ref 0–1)
BILIRUB SERPL-MCNC: 0.6 MG/DL (ref 0–1)
BUN SERPL-MCNC: 58 MG/DL (ref 7–20)
CALCIUM SERPL-MCNC: 9.4 MG/DL (ref 8.3–10.6)
CHLORIDE SERPL-SCNC: 116 MMOL/L (ref 99–110)
CO2 SERPL-SCNC: 21 MMOL/L (ref 21–32)
CREAT SERPL-MCNC: 3.5 MG/DL (ref 0.8–1.3)
EOSINOPHIL # BLD: 0.12 K/UL
EOSINOPHILS RELATIVE PERCENT: 1 % (ref 0–3)
ERYTHROCYTE [DISTWIDTH] IN BLOOD BY AUTOMATED COUNT: 16 % (ref 11.7–14.9)
GFR, ESTIMATED: 18 ML/MIN/1.73M2
GLUCOSE BLD-MCNC: 78 MG/DL (ref 74–99)
GLUCOSE BLD-MCNC: 83 MG/DL (ref 74–99)
GLUCOSE BLD-MCNC: 83 MG/DL (ref 74–99)
GLUCOSE BLD-MCNC: 88 MG/DL (ref 74–99)
GLUCOSE SERPL-MCNC: 89 MG/DL (ref 74–99)
HCT VFR BLD AUTO: 45.4 % (ref 42–52)
HGB BLD-MCNC: 13.8 G/DL (ref 13.5–18)
IMM GRANULOCYTES # BLD AUTO: 0.13 K/UL
IMM GRANULOCYTES NFR BLD: 1 %
LYMPHOCYTES NFR BLD: 1.3 K/UL
LYMPHOCYTES RELATIVE PERCENT: 14 % (ref 24–44)
MCH RBC QN AUTO: 29.6 PG (ref 27–31)
MCHC RBC AUTO-ENTMCNC: 30.4 G/DL (ref 32–36)
MCV RBC AUTO: 97.2 FL (ref 78–100)
MONOCYTES NFR BLD: 0.69 K/UL
MONOCYTES NFR BLD: 8 % (ref 0–5)
NEUTROPHILS NFR BLD: 75 % (ref 36–66)
NEUTS SEG NFR BLD: 6.96 K/UL
PLATELET # BLD AUTO: 186 K/UL (ref 140–440)
PMV BLD AUTO: 11.9 FL (ref 7.5–11.1)
POTASSIUM SERPL-SCNC: 5.2 MMOL/L (ref 3.5–5.1)
PROT SERPL-MCNC: 6.9 G/DL (ref 6.4–8.2)
RBC # BLD AUTO: 4.67 M/UL (ref 4.6–6.2)
SODIUM SERPL-SCNC: 147 MMOL/L (ref 136–145)
WBC OTHER # BLD: 9.2 K/UL (ref 4–10.5)

## 2025-05-29 PROCEDURE — 6370000000 HC RX 637 (ALT 250 FOR IP): Performed by: STUDENT IN AN ORGANIZED HEALTH CARE EDUCATION/TRAINING PROGRAM

## 2025-05-29 PROCEDURE — 6370000000 HC RX 637 (ALT 250 FOR IP)

## 2025-05-29 PROCEDURE — 94761 N-INVAS EAR/PLS OXIMETRY MLT: CPT

## 2025-05-29 PROCEDURE — 80053 COMPREHEN METABOLIC PANEL: CPT

## 2025-05-29 PROCEDURE — 6360000002 HC RX W HCPCS: Performed by: STUDENT IN AN ORGANIZED HEALTH CARE EDUCATION/TRAINING PROGRAM

## 2025-05-29 PROCEDURE — 85520 HEPARIN ASSAY: CPT

## 2025-05-29 PROCEDURE — 85025 COMPLETE CBC W/AUTO DIFF WBC: CPT

## 2025-05-29 PROCEDURE — 94640 AIRWAY INHALATION TREATMENT: CPT

## 2025-05-29 PROCEDURE — 99232 SBSQ HOSP IP/OBS MODERATE 35: CPT | Performed by: INTERNAL MEDICINE

## 2025-05-29 PROCEDURE — APPNB15 APP NON BILLABLE TIME 0-15 MINS

## 2025-05-29 PROCEDURE — 70450 CT HEAD/BRAIN W/O DYE: CPT

## 2025-05-29 PROCEDURE — 6370000000 HC RX 637 (ALT 250 FOR IP): Performed by: NURSE PRACTITIONER

## 2025-05-29 PROCEDURE — 82962 GLUCOSE BLOOD TEST: CPT

## 2025-05-29 PROCEDURE — 36415 COLL VENOUS BLD VENIPUNCTURE: CPT

## 2025-05-29 RX ORDER — ASPIRIN 81 MG/1
81 TABLET, CHEWABLE ORAL DAILY
Status: DISCONTINUED | OUTPATIENT
Start: 2025-05-29 | End: 2025-05-29

## 2025-05-29 RX ORDER — AMIODARONE HYDROCHLORIDE 200 MG/1
200 TABLET ORAL DAILY
Qty: 30 TABLET | Refills: 0
Start: 2025-05-30 | End: 2025-06-29

## 2025-05-29 RX ORDER — METOPROLOL SUCCINATE 25 MG/1
25 TABLET, EXTENDED RELEASE ORAL DAILY
Qty: 30 TABLET | Refills: 3
Start: 2025-05-30

## 2025-05-29 RX ORDER — METOPROLOL SUCCINATE 25 MG/1
25 TABLET, EXTENDED RELEASE ORAL DAILY
Status: DISCONTINUED | OUTPATIENT
Start: 2025-05-30 | End: 2025-05-29 | Stop reason: HOSPADM

## 2025-05-29 RX ORDER — ROSUVASTATIN CALCIUM 10 MG/1
10 TABLET, COATED ORAL NIGHTLY
Qty: 30 TABLET | Refills: 3
Start: 2025-05-29

## 2025-05-29 RX ORDER — AMOXICILLIN AND CLAVULANATE POTASSIUM 500; 125 MG/1; MG/1
1 TABLET, FILM COATED ORAL EVERY 12 HOURS SCHEDULED
Qty: 10 TABLET | Refills: 0
Start: 2025-05-29 | End: 2025-06-03

## 2025-05-29 RX ORDER — TAMSULOSIN HYDROCHLORIDE 0.4 MG/1
0.4 CAPSULE ORAL DAILY
Qty: 30 CAPSULE | Refills: 3
Start: 2025-05-30

## 2025-05-29 RX ORDER — AMLODIPINE BESYLATE 5 MG/1
5 TABLET ORAL DAILY
Status: DISCONTINUED | OUTPATIENT
Start: 2025-05-29 | End: 2025-05-29 | Stop reason: HOSPADM

## 2025-05-29 RX ORDER — AMLODIPINE BESYLATE 5 MG/1
5 TABLET ORAL DAILY
Qty: 30 TABLET | Refills: 3
Start: 2025-05-30

## 2025-05-29 RX ORDER — LACTOBACILLUS RHAMNOSUS GG 10B CELL
1 CAPSULE ORAL
Qty: 7 CAPSULE | Refills: 0
Start: 2025-05-30 | End: 2025-06-06

## 2025-05-29 RX ADMIN — AMLODIPINE BESYLATE 5 MG: 5 TABLET ORAL at 13:36

## 2025-05-29 RX ADMIN — APIXABAN 5 MG: 5 TABLET, FILM COATED ORAL at 11:16

## 2025-05-29 RX ADMIN — HEPARIN SODIUM 2000 UNITS: 1000 INJECTION INTRAVENOUS; SUBCUTANEOUS at 09:35

## 2025-05-29 RX ADMIN — CLOPIDOGREL BISULFATE 75 MG: 75 TABLET, FILM COATED ORAL at 09:16

## 2025-05-29 RX ADMIN — METOPROLOL SUCCINATE 50 MG: 50 TABLET, EXTENDED RELEASE ORAL at 09:16

## 2025-05-29 RX ADMIN — AMOXICILLIN AND CLAVULANATE POTASSIUM 1 TABLET: 500; 125 TABLET, FILM COATED ORAL at 09:16

## 2025-05-29 RX ADMIN — Medication 1 CAPSULE: at 09:16

## 2025-05-29 RX ADMIN — BUDESONIDE AND FORMOTEROL FUMARATE DIHYDRATE 2 PUFF: 160; 4.5 AEROSOL RESPIRATORY (INHALATION) at 12:18

## 2025-05-29 RX ADMIN — AMIODARONE HYDROCHLORIDE 200 MG: 200 TABLET ORAL at 09:16

## 2025-05-29 RX ADMIN — ASPIRIN 81 MG: 81 TABLET, CHEWABLE ORAL at 11:16

## 2025-05-29 RX ADMIN — TAMSULOSIN HYDROCHLORIDE 0.4 MG: 0.4 CAPSULE ORAL at 09:16

## 2025-05-29 ASSESSMENT — PAIN SCALES - GENERAL
PAINLEVEL_OUTOF10: 0

## 2025-05-29 ASSESSMENT — PAIN SCALES - WONG BAKER
WONGBAKER_NUMERICALRESPONSE: NO HURT
WONGBAKER_NUMERICALRESPONSE: NO HURT

## 2025-05-29 NOTE — PLAN OF CARE
Problem: Discharge Planning  Goal: Discharge to home or other facility with appropriate resources  5/24/2025 0836 by Britt Pina RN  Outcome: Progressing  5/24/2025 0402 by Lela Parra RN  Outcome: Progressing     Problem: Safety - Adult  Goal: Free from fall injury  5/24/2025 0836 by Britt Pina RN  Outcome: Progressing  5/24/2025 0402 by Lela Parra RN  Outcome: Progressing     Problem: Neurosensory - Adult  Goal: Achieves stable or improved neurological status  5/24/2025 0836 by Britt Pina RN  Outcome: Progressing  5/24/2025 0402 by Lela Parra RN  Outcome: Progressing  Goal: Achieves maximal functionality and self care  5/24/2025 0836 by Britt Pina RN  Outcome: Progressing  5/24/2025 0402 by Lela Parra RN  Outcome: Progressing     Problem: Respiratory - Adult  Goal: Achieves optimal ventilation and oxygenation  5/24/2025 0836 by Britt Pina RN  Outcome: Progressing  5/24/2025 0402 by Lela Parra RN  Outcome: Progressing     Problem: Skin/Tissue Integrity - Adult  Goal: Skin integrity remains intact  5/24/2025 0836 by Britt Pina RN  Outcome: Progressing  5/24/2025 0402 by Lela Parra RN  Outcome: Progressing  Goal: Oral mucous membranes remain intact  5/24/2025 0836 by Britt Pina RN  Outcome: Progressing  5/24/2025 0402 by Lela Parra RN  Outcome: Progressing     Problem: Musculoskeletal - Adult  Goal: Return mobility to safest level of function  5/24/2025 0836 by Britt Pina RN  Outcome: Progressing  5/24/2025 0402 by Lela Parra RN  Outcome: Progressing  Goal: Return ADL status to a safe level of function  5/24/2025 0836 by Britt Pina RN  Outcome: Progressing  5/24/2025 0402 by Lela Parra RN  Outcome: Progressing     Problem: Genitourinary - Adult  Goal: Urinary catheter remains patent  5/24/2025 0836 by Britt Pina RN  Outcome: Progressing  5/24/2025 0402 by Lela Parra 
  Problem: Discharge Planning  Goal: Discharge to home or other facility with appropriate resources  5/25/2025 1108 by Britt Pina RN  Outcome: Progressing  5/25/2025 0425 by Ewelina Mills RN  Outcome: Progressing  Flowsheets (Taken 5/24/2025 1917)  Discharge to home or other facility with appropriate resources:   Identify barriers to discharge with patient and caregiver   Arrange for needed discharge resources and transportation as appropriate   Identify discharge learning needs (meds, wound care, etc)   Refer to discharge planning if patient needs post-hospital services based on physician order or complex needs related to functional status, cognitive ability or social support system     Problem: Safety - Adult  Goal: Free from fall injury  5/25/2025 1108 by Britt Pina RN  Outcome: Progressing  5/25/2025 0425 by Ewelina Mills RN  Outcome: Progressing     Problem: Neurosensory - Adult  Goal: Achieves stable or improved neurological status  5/25/2025 1108 by Britt Pina RN  Outcome: Progressing  5/25/2025 0425 by Ewelina Mills RN  Outcome: Progressing  Goal: Achieves maximal functionality and self care  5/25/2025 1108 by Britt Pina RN  Outcome: Progressing  5/25/2025 0425 by Ewelina Mills RN  Outcome: Progressing     Problem: Respiratory - Adult  Goal: Achieves optimal ventilation and oxygenation  5/25/2025 1108 by Britt Pina RN  Outcome: Progressing  5/25/2025 0425 by Ewelina Mills RN  Outcome: Progressing  Flowsheets (Taken 5/24/2025 1917)  Achieves optimal ventilation and oxygenation:   Assess for changes in respiratory status   Assess for changes in mentation and behavior   Position to facilitate oxygenation and minimize respiratory effort   Oxygen supplementation based on oxygen saturation or arterial blood gases   Initiate smoking cessation protocol as indicated   Encourage broncho-pulmonary hygiene including cough, deep breathe, incentive 
  Problem: Discharge Planning  Goal: Discharge to home or other facility with appropriate resources  5/25/2025 2354 by Elodia Mercedes RN  Outcome: Progressing  5/25/2025 1108 by Britt Pina RN  Outcome: Progressing     Problem: Safety - Adult  Goal: Free from fall injury  5/25/2025 2354 by Elodia Mercedes RN  Outcome: Progressing  5/25/2025 1108 by Britt Pina RN  Outcome: Progressing     Problem: Neurosensory - Adult  Goal: Achieves stable or improved neurological status  5/25/2025 2354 by Elodia Mercedes RN  Outcome: Progressing  5/25/2025 1108 by Britt Pina RN  Outcome: Progressing  Goal: Achieves maximal functionality and self care  5/25/2025 2354 by Elodia Mercedes RN  Outcome: Progressing  5/25/2025 1108 by Britt Pina RN  Outcome: Progressing     Problem: Respiratory - Adult  Goal: Achieves optimal ventilation and oxygenation  5/25/2025 2354 by Elodia Mercedes RN  Outcome: Progressing  5/25/2025 1108 by Britt Pina RN  Outcome: Progressing     Problem: Skin/Tissue Integrity - Adult  Goal: Skin integrity remains intact  5/25/2025 2354 by Elodia Mercedes RN  Outcome: Progressing  5/25/2025 1108 by Britt Pina RN  Outcome: Progressing  Goal: Oral mucous membranes remain intact  5/25/2025 2354 by Elodia Mercedes RN  Outcome: Progressing  5/25/2025 1108 by Britt Pina RN  Outcome: Progressing     Problem: Musculoskeletal - Adult  Goal: Return mobility to safest level of function  5/25/2025 2354 by Elodia Mercedes RN  Outcome: Progressing  5/25/2025 1108 by Britt Pina RN  Outcome: Progressing  Goal: Return ADL status to a safe level of function  5/25/2025 2354 by Elodia Mercedes RN  Outcome: Progressing  5/25/2025 1108 by Britt Pina RN  Outcome: Progressing     Problem: Genitourinary - Adult  Goal: Urinary catheter remains patent  5/25/2025 2354 by Elodia Mercedes RN  Outcome: Progressing  5/25/2025 1108 by Britt Pina RN  Outcome: 
  Problem: Discharge Planning  Goal: Discharge to home or other facility with appropriate resources  5/26/2025 1144 by Olu Amaya RN  Outcome: Progressing  5/25/2025 2354 by Elodia Mercedes RN  Outcome: Progressing     Problem: Safety - Adult  Goal: Free from fall injury  5/26/2025 1144 by Olu Amaya RN  Outcome: Progressing  5/25/2025 2354 by Elodia Mercedes RN  Outcome: Progressing     Problem: Neurosensory - Adult  Goal: Achieves stable or improved neurological status  5/26/2025 1144 by Olu Amaya RN  Outcome: Progressing  5/25/2025 2354 by Elodia Mercedes RN  Outcome: Progressing  Goal: Achieves maximal functionality and self care  5/26/2025 1144 by Olu Amaya RN  Outcome: Progressing  5/25/2025 2354 by Elodia Mercedes RN  Outcome: Progressing     Problem: Respiratory - Adult  Goal: Achieves optimal ventilation and oxygenation  5/26/2025 1144 by Olu Amaya RN  Outcome: Progressing  5/25/2025 2354 by Elodia Mercedes RN  Outcome: Progressing     Problem: Skin/Tissue Integrity - Adult  Goal: Skin integrity remains intact  5/26/2025 1144 by Olu Amaya RN  Outcome: Progressing  5/25/2025 2354 by Elodia Mercedes RN  Outcome: Progressing  Goal: Oral mucous membranes remain intact  5/26/2025 1144 by Olu Amaya RN  Outcome: Progressing  5/25/2025 2354 by Elodia Mercedes RN  Outcome: Progressing     Problem: Musculoskeletal - Adult  Goal: Return mobility to safest level of function  5/26/2025 1144 by Olu Amaya RN  Outcome: Progressing  5/25/2025 2354 by Elodia Mercedes RN  Outcome: Progressing  Goal: Return ADL status to a safe level of function  5/26/2025 1144 by Olu Amaya RN  Outcome: Progressing  5/25/2025 2354 by Elodia Mercedes RN  Outcome: Progressing     Problem: Genitourinary - Adult  Goal: Urinary catheter remains patent  5/26/2025 1144 by Olu Amaya RN  Outcome: Progressing  5/25/2025 2354 by Daren, 
  Problem: Discharge Planning  Goal: Discharge to home or other facility with appropriate resources  5/27/2025 0007 by Elodia Mercedes RN  Outcome: Progressing  5/26/2025 1144 by Olu Amaya RN  Outcome: Progressing     Problem: Safety - Adult  Goal: Free from fall injury  5/27/2025 0007 by Elodia Mercedes RN  Outcome: Progressing  5/26/2025 1144 by Olu Amaya RN  Outcome: Progressing     Problem: Neurosensory - Adult  Goal: Achieves stable or improved neurological status  5/27/2025 0007 by Elodia Mercedes RN  Outcome: Progressing  5/26/2025 1144 by Olu Amaya RN  Outcome: Progressing  Goal: Achieves maximal functionality and self care  5/27/2025 0007 by Elodia Mercedes RN  Outcome: Progressing  5/26/2025 1144 by Olu Amaya RN  Outcome: Progressing     Problem: Respiratory - Adult  Goal: Achieves optimal ventilation and oxygenation  5/27/2025 0007 by Elodia Mercedes RN  Outcome: Progressing  5/26/2025 1144 by Olu Amaya RN  Outcome: Progressing     Problem: Skin/Tissue Integrity - Adult  Goal: Skin integrity remains intact  5/27/2025 0007 by Elodia Mercedes RN  Outcome: Progressing  5/26/2025 1144 by Olu Amaya RN  Outcome: Progressing  Goal: Oral mucous membranes remain intact  5/27/2025 0007 by Elodia Mercedes RN  Outcome: Progressing  5/26/2025 1144 by Olu Amaya, RN  Outcome: Progressing     Problem: Musculoskeletal - Adult  Goal: Return mobility to safest level of function  5/27/2025 0007 by Elodia Mercedes RN  Outcome: Progressing  5/26/2025 1144 by Olu Amaya RN  Outcome: Progressing  Goal: Return ADL status to a safe level of function  5/27/2025 0007 by Elodia Mercedes RN  Outcome: Progressing  5/26/2025 1144 by Olu Amaya RN  Outcome: Progressing     Problem: Genitourinary - Adult  Goal: Urinary catheter remains patent  5/27/2025 0007 by Elodia Mercedes RN  Outcome: Progressing  5/26/2025 1144 by Olu Amaya 
  Problem: Discharge Planning  Goal: Discharge to home or other facility with appropriate resources  5/27/2025 1209 by Olu Amaya RN  Outcome: Progressing  5/27/2025 0007 by Elodia Mercedes RN  Outcome: Progressing     Problem: Safety - Adult  Goal: Free from fall injury  5/27/2025 1209 by Olu Amaya RN  Outcome: Progressing  5/27/2025 0007 by Elodia Mercedes RN  Outcome: Progressing     Problem: Neurosensory - Adult  Goal: Achieves stable or improved neurological status  5/27/2025 1209 by Olu Amaya RN  Outcome: Progressing  5/27/2025 0007 by Elodia Mercedes RN  Outcome: Progressing  Goal: Achieves maximal functionality and self care  5/27/2025 1209 by Olu Amaya RN  Outcome: Progressing  5/27/2025 0007 by Elodia Mercedes RN  Outcome: Progressing     Problem: Respiratory - Adult  Goal: Achieves optimal ventilation and oxygenation  5/27/2025 1209 by Olu Amaya RN  Outcome: Progressing  5/27/2025 0007 by Elodia Mercedes RN  Outcome: Progressing     Problem: Skin/Tissue Integrity - Adult  Goal: Skin integrity remains intact  5/27/2025 1209 by Olu Amaya RN  Outcome: Progressing  5/27/2025 0007 by Elodia Mercedes RN  Outcome: Progressing  Goal: Oral mucous membranes remain intact  5/27/2025 1209 by Olu Amaya RN  Outcome: Progressing  5/27/2025 0007 by Elodia Mercedes RN  Outcome: Progressing     Problem: Musculoskeletal - Adult  Goal: Return mobility to safest level of function  5/27/2025 1209 by Olu Amaya RN  Outcome: Progressing  5/27/2025 0007 by Elodia Mercedes RN  Outcome: Progressing  Goal: Return ADL status to a safe level of function  5/27/2025 1209 by Olu Amaya RN  Outcome: Progressing  5/27/2025 0007 by Elodia Mercedes RN  Outcome: Progressing     Problem: Genitourinary - Adult  Goal: Urinary catheter remains patent  5/27/2025 1209 by Olu Amaya RN  Outcome: Progressing  5/27/2025 0007 by Daren, 
  Problem: Discharge Planning  Goal: Discharge to home or other facility with appropriate resources  5/27/2025 2131 by Elodia Mercedes RN  Outcome: Progressing  5/27/2025 1209 by Olu Amaya RN  Outcome: Progressing     Problem: Safety - Adult  Goal: Free from fall injury  5/27/2025 2131 by Elodia Mercedes RN  Outcome: Progressing  5/27/2025 1209 by Olu Amaya RN  Outcome: Progressing     Problem: Neurosensory - Adult  Goal: Achieves stable or improved neurological status  5/27/2025 2131 by Elodia Mercedes RN  Outcome: Progressing  5/27/2025 1209 by Olu Amaya RN  Outcome: Progressing  Goal: Achieves maximal functionality and self care  5/27/2025 2131 by Elodia Mercedes RN  Outcome: Progressing  5/27/2025 1209 by Olu Amaya RN  Outcome: Progressing     Problem: Respiratory - Adult  Goal: Achieves optimal ventilation and oxygenation  5/27/2025 2131 by Elodia Mercedes RN  Outcome: Progressing  5/27/2025 1209 by Olu Amaya RN  Outcome: Progressing     Problem: Skin/Tissue Integrity - Adult  Goal: Skin integrity remains intact  5/27/2025 2131 by Elodia Mercedes RN  Outcome: Progressing  5/27/2025 1209 by Olu Amaya RN  Outcome: Progressing  Goal: Oral mucous membranes remain intact  5/27/2025 2131 by Elodia Mercedes RN  Outcome: Progressing  5/27/2025 1209 by Olu Amaya, RN  Outcome: Progressing     Problem: Musculoskeletal - Adult  Goal: Return mobility to safest level of function  5/27/2025 2131 by Elodia Mercedes RN  Outcome: Progressing  5/27/2025 1209 by Olu Amaya, RN  Outcome: Progressing  Goal: Return ADL status to a safe level of function  5/27/2025 2131 by Elodia Mercedes RN  Outcome: Progressing  5/27/2025 1209 by Olu Amaya RN  Outcome: Progressing     Problem: Genitourinary - Adult  Goal: Urinary catheter remains patent  5/27/2025 2131 by Elodia Mercedes RN  Outcome: Progressing  5/27/2025 1209 by Olu Amaya 
  Problem: Discharge Planning  Goal: Discharge to home or other facility with appropriate resources  5/29/2025 0814 by Britt Pina RN  Outcome: Progressing  5/28/2025 1956 by Sue Chin RN  Outcome: Progressing     Problem: Safety - Adult  Goal: Free from fall injury  5/29/2025 0814 by Britt Pina RN  Outcome: Progressing  5/28/2025 1956 by Sue Chin RN  Outcome: Progressing     Problem: Neurosensory - Adult  Goal: Achieves stable or improved neurological status  5/29/2025 0814 by Britt Pina RN  Outcome: Progressing  5/28/2025 1956 by Sue Chin RN  Outcome: Progressing  Goal: Achieves maximal functionality and self care  5/29/2025 0814 by Britt Pina RN  Outcome: Progressing  5/28/2025 1956 by Sue Chin RN  Outcome: Progressing     Problem: Respiratory - Adult  Goal: Achieves optimal ventilation and oxygenation  5/29/2025 0814 by Britt Pina RN  Outcome: Progressing  5/28/2025 1956 by Sue Chin RN  Outcome: Progressing     Problem: Skin/Tissue Integrity - Adult  Goal: Skin integrity remains intact  5/29/2025 0814 by Britt Pina RN  Outcome: Progressing  5/28/2025 1956 by Sue Chin RN  Outcome: Progressing  Goal: Oral mucous membranes remain intact  5/29/2025 0814 by Britt Pina RN  Outcome: Progressing  5/28/2025 1956 by Sue Chin RN  Outcome: Progressing     Problem: Musculoskeletal - Adult  Goal: Return mobility to safest level of function  5/29/2025 0814 by Britt Pina RN  Outcome: Progressing  5/28/2025 1956 by Sue Chin RN  Outcome: Progressing  Goal: Return ADL status to a safe level of function  5/29/2025 0814 by Britt Pina RN  Outcome: Progressing  5/28/2025 1956 by Sue Chin RN  Outcome: Progressing     Problem: Genitourinary - Adult  Goal: Urinary catheter remains patent  5/29/2025 0814 by Britt Pina RN  Outcome: Progressing  5/28/2025 1956 by Sue Chin RN  Outcome: Progressing     Problem: 
  Problem: Discharge Planning  Goal: Discharge to home or other facility with appropriate resources  5/29/2025 1702 by Britt Pina RN  Outcome: Adequate for Discharge  5/29/2025 0814 by Britt Pina RN  Outcome: Progressing     Problem: Safety - Adult  Goal: Free from fall injury  5/29/2025 1702 by Britt Pina RN  Outcome: Adequate for Discharge  5/29/2025 0814 by Britt Pina RN  Outcome: Progressing     Problem: Neurosensory - Adult  Goal: Achieves stable or improved neurological status  5/29/2025 1702 by Britt Pina RN  Outcome: Adequate for Discharge  5/29/2025 0814 by Britt Pina RN  Outcome: Progressing  Goal: Achieves maximal functionality and self care  5/29/2025 1702 by Britt Pina RN  Outcome: Adequate for Discharge  5/29/2025 0814 by Britt Pina RN  Outcome: Progressing     Problem: Respiratory - Adult  Goal: Achieves optimal ventilation and oxygenation  5/29/2025 1702 by Britt Pina RN  Outcome: Adequate for Discharge  5/29/2025 0814 by Britt Pina RN  Outcome: Progressing     Problem: Skin/Tissue Integrity - Adult  Goal: Skin integrity remains intact  5/29/2025 1702 by Britt Pina RN  Outcome: Adequate for Discharge  5/29/2025 0814 by Britt Pina RN  Outcome: Progressing  Goal: Oral mucous membranes remain intact  5/29/2025 1702 by Britt Pina RN  Outcome: Adequate for Discharge  5/29/2025 0814 by Britt Pina RN  Outcome: Progressing     Problem: Musculoskeletal - Adult  Goal: Return mobility to safest level of function  5/29/2025 1702 by Britt Pina RN  Outcome: Adequate for Discharge  5/29/2025 0814 by Britt Pina RN  Outcome: Progressing  Goal: Return ADL status to a safe level of function  5/29/2025 1702 by Britt Pina RN  Outcome: Adequate for Discharge  5/29/2025 0814 by Britt Pina RN  Outcome: Progressing     Problem: Genitourinary - Adult  Goal: Urinary 
  Problem: Discharge Planning  Goal: Discharge to home or other facility with appropriate resources  Outcome: Progressing     Problem: Safety - Adult  Goal: Free from fall injury  Outcome: Progressing     Problem: Neurosensory - Adult  Goal: Achieves stable or improved neurological status  Outcome: Progressing  Goal: Achieves maximal functionality and self care  Outcome: Progressing     Problem: Respiratory - Adult  Goal: Achieves optimal ventilation and oxygenation  Outcome: Progressing     Problem: Skin/Tissue Integrity - Adult  Goal: Skin integrity remains intact  Outcome: Progressing  Flowsheets (Taken 5/28/2025 1206)  Skin Integrity Remains Intact:   Monitor for areas of redness and/or skin breakdown   Turn and reposition as indicated  Goal: Oral mucous membranes remain intact  Outcome: Progressing  Flowsheets (Taken 5/28/2025 1206)  Oral Mucous Membranes Remain Intact: Assess oral mucosa and hygiene practices     Problem: Musculoskeletal - Adult  Goal: Return mobility to safest level of function  Outcome: Progressing  Goal: Return ADL status to a safe level of function  Outcome: Progressing     Problem: Genitourinary - Adult  Goal: Urinary catheter remains patent  Outcome: Progressing     Problem: Infection - Adult  Goal: Absence of infection at discharge  Outcome: Progressing     Problem: Metabolic/Fluid and Electrolytes - Adult  Goal: Electrolytes maintained within normal limits  Outcome: Progressing     Problem: Safety - Medical Restraint  Goal: Remains free of injury from restraints (Restraint for Interference with Medical Device)  Description: INTERVENTIONS:1. Determine that other, less restrictive measures have been tried or would not be effective before applying the restraint2. Evaluate the patient's condition at the time of restraint application3. Inform patient/family regarding the reason for restraint4. Q2H: Monitor safety, psychosocial status, comfort, nutrition and hydration  Outcome: Progressing   
  Problem: Discharge Planning  Goal: Discharge to home or other facility with appropriate resources  Outcome: Progressing     Problem: Safety - Adult  Goal: Free from fall injury  Outcome: Progressing     Problem: Neurosensory - Adult  Goal: Achieves stable or improved neurological status  Outcome: Progressing  Goal: Achieves maximal functionality and self care  Outcome: Progressing     Problem: Respiratory - Adult  Goal: Achieves optimal ventilation and oxygenation  Outcome: Progressing     Problem: Skin/Tissue Integrity - Adult  Goal: Skin integrity remains intact  Outcome: Progressing  Goal: Oral mucous membranes remain intact  Outcome: Progressing     Problem: Musculoskeletal - Adult  Goal: Return mobility to safest level of function  Outcome: Progressing  Goal: Return ADL status to a safe level of function  Outcome: Progressing     Problem: Genitourinary - Adult  Goal: Urinary catheter remains patent  Outcome: Progressing     Problem: Infection - Adult  Goal: Absence of infection at discharge  Outcome: Progressing     Problem: Metabolic/Fluid and Electrolytes - Adult  Goal: Electrolytes maintained within normal limits  Outcome: Progressing     
  Problem: Safety - Adult  Goal: Free from fall injury  5/28/2025 1956 by Sue Chin, RN  Outcome: Progressing  5/28/2025 1207 by Olu Amaya, RN  Outcome: Progressing     Problem: Infection - Adult  Goal: Absence of infection at discharge  5/28/2025 1956 by Sue Chin, RN  Outcome: Progressing  5/28/2025 1207 by Olu Amaya, RN  Outcome: Progressing     Problem: Pain  Goal: Verbalizes/displays adequate comfort level or baseline comfort level  5/28/2025 1956 by Sue Chin, RN  Outcome: Progressing  5/28/2025 1207 by Olu Amaya, RN  Outcome: Progressing  Flowsheets (Taken 5/28/2025 1139)  Verbalizes/displays adequate comfort level or baseline comfort level: Assess pain using appropriate pain scale     
comfort, nutrition and hydration  Taken 5/24/2025 1500 by Britt Pina, RN  Remains free of injury from restraints (restraint for interference with medical device): Every 2 hours: Monitor safety, psychosocial status, comfort, nutrition and hydration     Problem: Pain  Goal: Verbalizes/displays adequate comfort level or baseline comfort level  Outcome: Progressing

## 2025-05-29 NOTE — CARE COORDINATION
CM reviewed pt’s medical record and discussed pt in IDR. CM notified family/sister--Norma Enriquez, Nurse--RN/Britt. CM arranged transportation with [facility] for a pick-up time of 7:30 PM (first available) and notified Kathleen/Sarah for[facility] to let her know that pt has a discharge order and pick-up time.    If pt requires narcotic/controlled medications, a script signed in ink is required for all facilities.    Discharging Nurse: Pt will discharge to [facility].     FAX any Scripts and AVS with completed [facility] to [phone]  then place in discharge envelope.      Call report to [phone].

## 2025-05-29 NOTE — PROGRESS NOTES
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 have camera in the room, no shortness of breath or overt confusion, urine output recorded 1.2 L for the last 24 hours, no fever and acceptable blood pressure    Amiodarone switched to orally        Data:     Current meds:    amiodarone  200 mg Oral BID    ciprofloxacin  500 mg Oral Daily    metoprolol succinate  50 mg Oral Daily    tamsulosin  0.4 mg Oral Daily    clopidogrel  75 mg Oral Daily    QUEtiapine  50 mg Oral QHS    rosuvastatin  10 mg Oral Nightly    budesonide-formoterol  2 puff Inhalation BID RT    enoxaparin  30 mg SubCUTAneous BID      dextrose           I/O last 3 completed shifts:  In: 1137.1 [P.O.:670; I.V.:297.8; IV Piggyback:169.4]  Out: 1750 [Urine:1750]    CBC:   Recent Labs     05/24/25  1440 05/25/25  0804 05/26/25  1006   WBC 31.7* 22.9* 15.3*   HGB 12.7* 12.1* 11.8*    153 157          Recent Labs     05/26/25  0725 05/26/25  1006 05/27/25  0507    142 144   K 5.1 4.9 5.0   * 113* 114*   CO2 14* 17* 18*   BUN 70* 70* 69*   CREATININE 4.0* 4.1* 4.1*   GLUCOSE 72* 75 79       Lab Results   Component Value Date    CALCIUM 8.6 05/27/2025    PHOS 3.9 05/26/2025       Objective:     Vitals: /78   Pulse (!) 49   Temp 98.6 °F (37 °C)   Resp 18   Wt 98.9 kg (218 lb 0.6 oz)   SpO2 96%   BMI 31.28 kg/m² ,    General appearance: Alert and awake but not oriented which probably his baseline  HEENT: No gross conjunctival pallor  Neck: No gross abnormality head of the bed elevated about 60 degrees  Lungs: No gross crackles  Heart: Bradycardia  Abdomen: Soft  Extremities: No gross edema and has a Mariee catheter      Problem List :         Impression :     Stage III acute kidney injury-with underlying chronic kidney stage G3A A3-nonoliguric-kidney function is 
Nephrology Progress Note  5/28/2025 7:21 AM        Subjective:   Admit Date: 5/23/2025  PCP: Kalani Liu MD    Interval History: No major event that I am aware of    Diet: Unclear how much he is eating specially water intake    ROS: He was awake and follows simple command, no apparent distress  Urine output dropped to 700 cc for the last 24 hours, no fever and acceptable blood pressure      Data:     Current meds:    amoxicillin-clavulanate  1 tablet Oral 2 times per day    lactobacillus  1 capsule Oral Daily with breakfast    amiodarone  200 mg Oral BID    metoprolol succinate  50 mg Oral Daily    tamsulosin  0.4 mg Oral Daily    clopidogrel  75 mg Oral Daily    QUEtiapine  50 mg Oral QHS    rosuvastatin  10 mg Oral Nightly    budesonide-formoterol  2 puff Inhalation BID RT    enoxaparin  30 mg SubCUTAneous BID      dextrose           I/O last 3 completed shifts:  In: 771.5 [P.O.:600; I.V.:55; IV Piggyback:116.5]  Out: 1900 [Urine:1900]    CBC:   Recent Labs     05/25/25  0804 05/26/25  1006   WBC 22.9* 15.3*   HGB 12.1* 11.8*    157          Recent Labs     05/26/25  1006 05/27/25  0507 05/28/25  0504    144 147*   K 4.9 5.0 5.0   * 114* 117*   CO2 17* 18* 19*   BUN 70* 69* 64*   CREATININE 4.1* 4.1* 4.0*   GLUCOSE 75 79 91       Lab Results   Component Value Date    CALCIUM 9.2 05/28/2025    PHOS 4.3 05/28/2025       Objective:     Vitals: /81   Pulse 55   Temp 98.9 °F (37.2 °C) (Oral)   Resp 19   Wt 97.6 kg (215 lb 2.7 oz)   SpO2 95%   BMI 30.87 kg/m² ,    General appearance: Alert awake baseline mentation  HEENT: Mild conjunctival pallor no gross scleral icterus  Neck: No gross abnormality  Lungs: Coarse breath sound but no gross crackles  Heart: Regular rate and rhythm  Abdomen: Soft, nontender superficial palpation  Extremities: No gross edema     has Mariee catheter      Problem List :         Impression :     Stage III acute kidney injury on top of chronic kidney stage G3a 
Nephrology Progress Note  5/29/2025 6:55 AM        Subjective:   Admit Date: 5/23/2025  PCP: Kalani Liu MD    Interval History: No major event that I am aware of    Diet: Reported eating better    ROS: He still has camera but he is alert awake, on heparin infusion  Urine output improved little more than 2 L for the last 24 hours-likely from post tubular injury diuresis-  No fever and acceptable blood pressure    Data:     Current meds:    amiodarone  200 mg Oral Daily    amoxicillin-clavulanate  1 tablet Oral 2 times per day    lactobacillus  1 capsule Oral Daily with breakfast    metoprolol succinate  50 mg Oral Daily    tamsulosin  0.4 mg Oral Daily    clopidogrel  75 mg Oral Daily    QUEtiapine  50 mg Oral QHS    rosuvastatin  10 mg Oral Nightly    budesonide-formoterol  2 puff Inhalation BID RT      heparin (PORCINE) Infusion 10 Units/kg/hr (05/29/25 0345)    dextrose           I/O last 3 completed shifts:  In: 420 [P.O.:420]  Out: 1900 [Urine:1900]    CBC:   Recent Labs     05/26/25  1006   WBC 15.3*   HGB 11.8*             Recent Labs     05/26/25  1006 05/27/25  0507 05/28/25  0504    144 147*   K 4.9 5.0 5.0   * 114* 117*   CO2 17* 18* 19*   BUN 70* 69* 64*   CREATININE 4.1* 4.1* 4.0*   GLUCOSE 75 79 91       Lab Results   Component Value Date    CALCIUM 9.2 05/28/2025    PHOS 4.3 05/28/2025       Objective:     Vitals: BP (!) 146/77   Pulse 53   Temp 98.2 °F (36.8 °C) (Oral)   Resp 18   Wt 97.6 kg (215 lb 2.7 oz)   SpO2 97%   BMI 30.87 kg/m² ,    General appearance: He was alert awake mentation probably his baseline  HEENT: No gross conjunctival pallor or scleral icterus  Neck: Head of the bed elevated roughly 45 degree  Lungs: Expiratory rhonchi but no gross crackles and has coarse breath sound as opposed to vesicular breath sound  Heart: Regular rate and rhythm  Abdomen: Soft  Extremities: Trace edema    Has Mariee catheter      Problem List :         Impression :     Stage III 
Per RN pt is still combative and not appropriate for MRI at this time.  Pt is not able to hold still for exam.   
Per RN pt is unable to hold still for MRI at this time. Will reach out to RN tomorrow and check to see if pt is appropriate for MRI at that time.   
Phlebotomist was unsuccessful drawing blood for AM Lab work.  Rescheduled another draw/attempt for 0700 today.   
Pt needs to have MRI Questionnaire completed in order for pt to have MRI performed.  
Pt's sister Norma notified of soft/bilateral/non-violent wrist restraints being applied to pt to prevent self harm as well as harm to staff. Norma also provided with an update on pt's overall status. All questions answered.   
RN to call when she is able to travel to MRI dept for pt MRI exam.   
RR-RN asked to aid in transport to CT for repeat scan. Primary RN assisted and patient taken to and from without incident. Hospitalist updated.     Luis Cantu   Rapid Resource RN   [facility] [phone]    
RR-RN rounding on patient for DI Score 88 with temp 102.5 and RR 45+ upon arrival patient arousable to verbal stimuli but quickly falls back to sleep. He moans and mumbles, says yes to questions, but doesn't answer appropriately. Apparently he has a recent CVA with aphasia and his mental status was baseline in ER. Hospitalist notified of findings including BP 79/53 ABG drawn, rectal temp taken, tylenol suppository given. PIV x2 started and bolus/ATB started. Hospitalist at bedside and BP better 86/63 with minor metabolic acidosis. No hypercarbia present. Please don't hesitate to call or utilize My [employer] messaging for Rapid Resource RN should any further needs or concerns arise.      Luis Cantu   Rapid Resource RN   [facility] [phone]    
Report called to Sarah Alexander. All questions answered. Patient is taken by [facility].  
This nurse attempted to call [facility] to give report on pt. Was unable to reach anyone. Will attempt again.  
This nurse contacted Norma, pt sister, and informed her pt was being discharged back to facility today.  
[Held by provider] tamsulosin  0.4 mg Oral Daily    clopidogrel  75 mg Oral Daily    QUEtiapine  50 mg Oral QHS    rosuvastatin  10 mg Oral Nightly    budesonide-formoterol  2 puff Inhalation BID RT    piperacillin-tazobactam  3,375 mg IntraVENous Q8H    enoxaparin  30 mg SubCUTAneous BID      Infusions:    amiodarone 0.5 mg/min (05/25/25 1156)    sodium chloride 100 mL/hr at 05/25/25 1156     PRN Meds: albuterol sulfate HFA, 2 puff, Q6H PRN  potassium chloride, 40 mEq, PRN   Or  potassium alternative oral replacement, 40 mEq, PRN   Or  potassium chloride, 10 mEq, PRN  potassium chloride, 10 mEq, PRN  magnesium sulfate, 2,000 mg, PRN  ondansetron, 4 mg, Q8H PRN   Or  ondansetron, 4 mg, Q6H PRN  polyethylene glycol, 17 g, Daily PRN  acetaminophen, 650 mg, Q6H PRN   Or  acetaminophen, 650 mg, Q6H PRN        Labs      Recent Results (from the past 24 hours)   CBC with Auto Differential    Collection Time: 05/24/25  2:40 PM   Result Value Ref Range    WBC 31.7 (HH) 4.0 - 10.5 k/uL    RBC 4.21 (L) 4.60 - 6.20 m/uL    Hemoglobin 12.7 (L) 13.5 - 18.0 g/dL    Hematocrit 41.1 (L) 42.0 - 52.0 %    MCV 97.6 78.0 - 100.0 fL    MCH 30.2 27.0 - 31.0 pg    MCHC 30.9 (L) 32.0 - 36.0 g/dL    RDW 15.4 (H) 11.7 - 14.9 %    Platelets 165 140 - 440 k/uL    MPV 11.9 (H) 7.5 - 11.1 fL    Neutrophils % 85 (H) 36 - 66 %    Lymphocytes % 4 (L) 24 - 44 %    Monocytes % 5 0 - 5 %    Eosinophils % 0 0.0 - 3.0 %    Basophils % 0 0 - 1 %    Bands 6 %    Neutrophils Absolute 26.95 k/uL    Lymphocytes Absolute 1.27 k/uL    Monocytes Absolute 1.59 k/uL    Eosinophils Absolute 0.00 k/uL    Basophils Absolute 0.00 k/uL    Absolute Bands 1.90 k/uL    WBC Morphology PRESENT VACUOLATED NEUTROPHILS     RBC Morphology Normal     Platelet Estimate Normal    Comprehensive Metabolic Panel w/ Reflex to MG    Collection Time: 05/24/25  2:40 PM   Result Value Ref Range    Sodium 145 136 - 145 mmol/L    Potassium 4.8 3.5 - 5.1 mmol/L    Chloride 113 (H) 99 - 110 
auscultation  Gastrointestinal: Soft, non tender  Genitourinary: no suprapubic tenderness  Musculoskeletal: No edema  Skin: warm, dry  Neuro: Alert, expressive phagia, confused    Medications:   Medications:    [START ON 5/29/2025] amiodarone  200 mg Oral Daily    amoxicillin-clavulanate  1 tablet Oral 2 times per day    lactobacillus  1 capsule Oral Daily with breakfast    metoprolol succinate  50 mg Oral Daily    tamsulosin  0.4 mg Oral Daily    clopidogrel  75 mg Oral Daily    QUEtiapine  50 mg Oral QHS    rosuvastatin  10 mg Oral Nightly    budesonide-formoterol  2 puff Inhalation BID RT    enoxaparin  30 mg SubCUTAneous BID      Infusions:    dextrose       PRN Meds: LORazepam, 0.5 mg, Once PRN  glucose, 4 tablet, PRN  dextrose bolus, 125 mL, PRN   Or  dextrose bolus, 250 mL, PRN  glucagon (rDNA), 1 mg, PRN  dextrose, , Continuous PRN  albuterol sulfate HFA, 2 puff, Q6H PRN  potassium chloride, 40 mEq, PRN   Or  potassium alternative oral replacement, 40 mEq, PRN   Or  potassium chloride, 10 mEq, PRN  potassium chloride, 10 mEq, PRN  magnesium sulfate, 2,000 mg, PRN  ondansetron, 4 mg, Q8H PRN   Or  ondansetron, 4 mg, Q6H PRN  polyethylene glycol, 17 g, Daily PRN  acetaminophen, 650 mg, Q6H PRN   Or  acetaminophen, 650 mg, Q6H PRN        Labs      Recent Results (from the past 24 hours)   POCT Glucose    Collection Time: 05/27/25 11:10 PM   Result Value Ref Range    POC Glucose 97 74 - 99 mg/dL   Urinalysis    Collection Time: 05/28/25  4:40 AM   Result Value Ref Range    Color, UA Yellow Yellow    Turbidity UA Cloudy (A) Clear    Glucose, Ur NEGATIVE NEGATIVE mg/dL    Bilirubin, Urine NEGATIVE NEGATIVE    Ketones, Urine NEGATIVE NEGATIVE mg/dL    Specific Gravity, UA 1.020 1.005 - 1.030    Urine Hgb MODERATE (A) NEGATIVE    pH, Urine 5.5 5.0 - 8.0    Protein, UA 30 (A) NEGATIVE mg/dL    Urobilinogen, Urine 0.2 0.0 - 1.0 EU/dL    Nitrite, Urine NEGATIVE NEGATIVE    Leukocyte Esterase, Urine LARGE (A) NEGATIVE 
iterative reconstruction techniques, or adjustment of the mA and/or kV according to patient size) were used to limit patient radiation dose. FINDINGS: Mild diffuse atrophy is seen with compensatory ventricular enlargement. Patchy white matter disease in the subcortical, periventricular and  deep white matter is seen. Again seen is an evolving infarct in the left parietal lobe and basal ganglia which demonstrates hyperdensity within the affected cortex. The remainder of the  gray-white matter dictation is maintained. No intra or extra-axial fluid collection or mass effect or shifting of the midline structures is seen. A retention cyst in the right maxillary sinus is seen. The remaining visualized paranasal sinuses, mastoid air cells, and orbits are intact. The calvarium is unremarkable. IMPRESSION: 1.  Evolving left MCA territory infarct with the suggestion of developing laminar necrosis in the affected cortex. 2.  Mild diffuse atrophy and chronic microvascular ischemic changes again seen. This dictation was created with voice recognition software.  While attempts have  been made to review the dictation as it is transcribed, on occasion the spoken word can be misinterpreted by the technology leading to omissions or inappropriate words, phrases or sentences.  Dictated and Electronically Signed By: Philip Samuel MD [facility] 5/23/2025 18:21        CT LUMBAR RECONSTRUCTION WO POST PROCESS  Result Date: 5/23/2025  EXAMINATION: CT LUMBAR RECONSTRUCTION WO POST PROCESS DATE OF EXAM:  5/23/2025 17:50 DEMOGRAPHICS: 69 years old Male INDICATION: fall COMPARISON: No existing relevant imaging study corresponding to the same anatomical region is available. TECHNIQUE: Contiguous axial slices of the lumbar spine were submitted without IV  administration of contrast. Additional coronal and sagittal reformatted images were submitted.    DOSE OPTIMIZATION: CT radiation dose optimization techniques (automated

## 2025-05-30 LAB
MICROORGANISM SPEC CULT: NORMAL
MICROORGANISM SPEC CULT: NORMAL
SERVICE CMNT-IMP: NORMAL
SERVICE CMNT-IMP: NORMAL
SPECIMEN DESCRIPTION: NORMAL
SPECIMEN DESCRIPTION: NORMAL

## 2025-05-30 NOTE — DISCHARGE SUMMARY
Discharge Summary    Name:  Randy Martinez /Age/Sex: 1955  (69 y.o. male)   MRN & CSN:  [id number] & [id number] Admission Date/Time: 2025 10:07 PM  Discharge Date/Time: 2025  7:48 PM   Attending:  No att. providers found Discharging Physician: Miko Paiz MD     Hospital Course:   Randy Martinez is a 69 y.o.  male  who presents with Severe sepsis (HCC)    HPI  \"Patient is a 69 y.o. male with a PMHx as above who presented to the ED with fall. Patient has a recent Left MCA infarct with residual aphasia and per NH staff mental status and aphasia is at baseline however was found on the floor after an unwitnessed fall.      Pt BP stable, however after hayes placement, 1.7L of UoP, BP dropped.      History obtained from: Patient, ED provider, EMR \"     Patient was found in severe sepsis secondary to pyelonephritis.  He received IV antibiotics Zosyn which was switched to Augmentin to cover for cellulitis.     Patient was also found to be in MARGARITA with an non anion gap metabolic acidosis.  Suspected that resulted from obstructive uropathy and ATN from sepsis.  Cr gradually improved with good urine output.  Nephrology consulted.  The patient also received IVF.  Patient has Hayes catheter placed.  Patient to follow-up with nephrology and urology outpatient.    Patient was also found to be in acute metabolic encephalopathy and worsening expressive aphasia.  He was found to have worsening of his prior infarct in the left MCA per MRI.  Neurology was consulted who cleared patient to start anticoagulation for his A-fib.    Patient was also found with A-fib RVR.  He was started on amiodarone drip which was transitioned to p.o..  His home metoprolol dose was decreased by cardiology as well.  HR was controlled before discharge       The following problems have been addressed during this hospitalization.    Severe Sepsis 2/2 pyelonephritis  On admit, hemodynamically stable, febrile 102.5, leukocytosis of 26. LA 1.8.

## 2025-06-02 ENCOUNTER — TELEPHONE (OUTPATIENT)
Age: 70
End: 2025-06-02

## 2025-06-02 NOTE — TELEPHONE ENCOUNTER
Care Transitions Initial Follow Up Call    Outreach made within 2 business days of discharge: Yes    Patient: Randy Martinez Patient : 1955   MRN: [id number]  Reason for Admission: Severe sepsis   Discharge Date: 25       Spoke with: NA/ Left VM    Discharge department/facility: [facility]    Scheduled appointment with PCP within 7-14 days    Follow Up  Future Appointments   Date Time Provider Department Center   2025 11:00 AM Lpn, [facility]       PAGE ALBERTO LPN

## 2025-06-04 ENCOUNTER — APPOINTMENT (OUTPATIENT)
Dept: CT IMAGING | Age: 70
End: 2025-06-04
Payer: MEDICARE

## 2025-06-04 ENCOUNTER — TELEPHONE (OUTPATIENT)
Dept: CARDIOLOGY CLINIC | Age: 70
End: 2025-06-04

## 2025-06-04 ENCOUNTER — HOSPITAL ENCOUNTER (EMERGENCY)
Age: 70
Discharge: NURSING, REHAB OR LONG-TERM CARE | End: 2025-06-05
Attending: EMERGENCY MEDICINE
Payer: MEDICARE

## 2025-06-04 DIAGNOSIS — R53.83 FATIGUE, UNSPECIFIED TYPE: ICD-10-CM

## 2025-06-04 DIAGNOSIS — N30.01 ACUTE CYSTITIS WITH HEMATURIA: Primary | ICD-10-CM

## 2025-06-04 PROCEDURE — 70450 CT HEAD/BRAIN W/O DYE: CPT

## 2025-06-04 PROCEDURE — 99284 EMERGENCY DEPT VISIT MOD MDM: CPT

## 2025-06-04 PROCEDURE — 96374 THER/PROPH/DIAG INJ IV PUSH: CPT

## 2025-06-04 RX ORDER — 0.9 % SODIUM CHLORIDE 0.9 %
1000 INTRAVENOUS SOLUTION INTRAVENOUS ONCE
Status: COMPLETED | OUTPATIENT
Start: 2025-06-04 | End: 2025-06-05

## 2025-06-04 RX ORDER — ONDANSETRON 2 MG/ML
4 INJECTION INTRAMUSCULAR; INTRAVENOUS ONCE
Status: COMPLETED | OUTPATIENT
Start: 2025-06-04 | End: 2025-06-05

## 2025-06-04 ASSESSMENT — PAIN - FUNCTIONAL ASSESSMENT: PAIN_FUNCTIONAL_ASSESSMENT: NONE - DENIES PAIN

## 2025-06-04 NOTE — TELEPHONE ENCOUNTER
Left a VM for Calli we did not order monitor. Gave her [facility] number     Encounter Details    Encounter Details  Date Type Department Care Team (Latest Contact Info) Description   05/14/2025 Ancillary Procedure [facility]   [address] [facility] [address]   [place] [address] [phone]    Cerebrovascular accident (CVA), unspecified mechanism;  Other hereditary cerebrovascular disease

## 2025-06-05 ENCOUNTER — HOSPITAL ENCOUNTER (EMERGENCY)
Age: 70
Discharge: HOME | End: 2025-06-06
Attending: EMERGENCY MEDICINE
Payer: MEDICARE

## 2025-06-05 ENCOUNTER — APPOINTMENT (OUTPATIENT)
Dept: CT IMAGING | Age: 70
End: 2025-06-05
Payer: MEDICARE

## 2025-06-05 VITALS
HEIGHT: 70 IN | BODY MASS INDEX: 29.55 KG/M2 | DIASTOLIC BLOOD PRESSURE: 76 MMHG | WEIGHT: 206.4 LBS | RESPIRATION RATE: 22 BRPM | SYSTOLIC BLOOD PRESSURE: 139 MMHG | OXYGEN SATURATION: 93 % | TEMPERATURE: 97.6 F | HEART RATE: 61 BPM

## 2025-06-05 DIAGNOSIS — Z86.73 HISTORY OF CVA (CEREBROVASCULAR ACCIDENT): ICD-10-CM

## 2025-06-05 DIAGNOSIS — R47.01 EXPRESSIVE APHASIA: ICD-10-CM

## 2025-06-05 DIAGNOSIS — N18.9 CHRONIC KIDNEY DISEASE, UNSPECIFIED CKD STAGE: Primary | ICD-10-CM

## 2025-06-05 LAB
ALBUMIN SERPL-MCNC: 3.5 G/DL (ref 3.4–5)
ALBUMIN/GLOB SERPL: 0.8 {RATIO}
ALP SERPL-CCNC: 108 U/L (ref 40–129)
ALT SERPL-CCNC: 53 U/L (ref 10–40)
ANION GAP SERPL CALCULATED.3IONS-SCNC: 10 MMOL/L (ref 9–17)
ANION GAP SERPL CALCULATED.3IONS-SCNC: 13 MMOL/L (ref 9–17)
AST SERPL-CCNC: 43 U/L (ref 15–37)
BASOPHILS # BLD: 0.03 K/UL
BASOPHILS # BLD: 0.03 K/UL
BASOPHILS NFR BLD: 0 % (ref 0–1)
BASOPHILS NFR BLD: 0 % (ref 0–1)
BILIRUB SERPL-MCNC: 0.7 MG/DL (ref 0–1)
BILIRUB UR QL STRIP: NEGATIVE
BUN SERPL-MCNC: 41 MG/DL (ref 7–20)
BUN SERPL-MCNC: 43 MG/DL (ref 7–20)
CALCIUM SERPL-MCNC: 9.4 MG/DL (ref 8.3–10.6)
CALCIUM SERPL-MCNC: 9.7 MG/DL (ref 8.3–10.6)
CHLORIDE SERPL-SCNC: 111 MMOL/L (ref 99–110)
CHLORIDE SERPL-SCNC: 112 MMOL/L (ref 99–110)
CLARITY UR: CLEAR
CO2 SERPL-SCNC: 21 MMOL/L (ref 21–32)
CO2 SERPL-SCNC: 21 MMOL/L (ref 21–32)
COLOR UR: YELLOW
CREAT SERPL-MCNC: 2.2 MG/DL (ref 0.8–1.3)
CREAT SERPL-MCNC: 2.5 MG/DL (ref 0.8–1.3)
CRYSTALS URNS MICRO: ABNORMAL /HPF
EKG ATRIAL RATE: 63 BPM
EKG DIAGNOSIS: NORMAL
EKG P AXIS: 60 DEGREES
EKG P-R INTERVAL: 168 MS
EKG Q-T INTERVAL: 420 MS
EKG QRS DURATION: 84 MS
EKG QTC CALCULATION (BAZETT): 429 MS
EKG R AXIS: 64 DEGREES
EKG T AXIS: 119 DEGREES
EKG VENTRICULAR RATE: 63 BPM
EOSINOPHIL # BLD: 0.11 K/UL
EOSINOPHIL # BLD: 0.13 K/UL
EOSINOPHILS RELATIVE PERCENT: 1 % (ref 0–3)
EOSINOPHILS RELATIVE PERCENT: 1 % (ref 0–3)
EPI CELLS #/AREA URNS HPF: ABNORMAL /HPF
ERYTHROCYTE [DISTWIDTH] IN BLOOD BY AUTOMATED COUNT: 15.7 % (ref 11.7–14.9)
ERYTHROCYTE [DISTWIDTH] IN BLOOD BY AUTOMATED COUNT: 15.9 % (ref 11.7–14.9)
GFR, ESTIMATED: 26 ML/MIN/1.73M2
GFR, ESTIMATED: 32 ML/MIN/1.73M2
GLUCOSE BLD-MCNC: 109 MG/DL (ref 74–99)
GLUCOSE SERPL-MCNC: 111 MG/DL (ref 74–99)
GLUCOSE SERPL-MCNC: 88 MG/DL (ref 74–99)
GLUCOSE UR STRIP-MCNC: NEGATIVE MG/DL
HCT VFR BLD AUTO: 49.1 % (ref 42–52)
HCT VFR BLD AUTO: 49.8 % (ref 42–52)
HGB BLD-MCNC: 14.9 G/DL (ref 13.5–18)
HGB BLD-MCNC: 15 G/DL (ref 13.5–18)
HGB UR QL STRIP.AUTO: ABNORMAL
IMM GRANULOCYTES # BLD AUTO: 0.04 K/UL
IMM GRANULOCYTES # BLD AUTO: 0.05 K/UL
IMM GRANULOCYTES NFR BLD: 0 %
IMM GRANULOCYTES NFR BLD: 0 %
KETONES UR STRIP-MCNC: NEGATIVE MG/DL
LEUKOCYTE ESTERASE UR QL STRIP: ABNORMAL
LIPASE SERPL-CCNC: 36 U/L (ref 13–60)
LYMPHOCYTES NFR BLD: 1.74 K/UL
LYMPHOCYTES NFR BLD: 1.98 K/UL
LYMPHOCYTES RELATIVE PERCENT: 15 % (ref 24–44)
LYMPHOCYTES RELATIVE PERCENT: 18 % (ref 24–44)
MCH RBC QN AUTO: 29.9 PG (ref 27–31)
MCH RBC QN AUTO: 30.2 PG (ref 27–31)
MCHC RBC AUTO-ENTMCNC: 30.1 G/DL (ref 32–36)
MCHC RBC AUTO-ENTMCNC: 30.3 G/DL (ref 32–36)
MCV RBC AUTO: 100.2 FL (ref 78–100)
MCV RBC AUTO: 98.6 FL (ref 78–100)
MONOCYTES NFR BLD: 0.56 K/UL
MONOCYTES NFR BLD: 0.61 K/UL
MONOCYTES NFR BLD: 5 % (ref 0–5)
MONOCYTES NFR BLD: 5 % (ref 0–5)
MUCOUS THREADS URNS QL MICRO: PRESENT
NEUTROPHILS NFR BLD: 75 % (ref 36–66)
NEUTROPHILS NFR BLD: 78 % (ref 36–66)
NEUTS SEG NFR BLD: 8.14 K/UL
NEUTS SEG NFR BLD: 9.11 K/UL
NITRITE UR QL STRIP: NEGATIVE
PH UR STRIP: 6 [PH] (ref 5–8)
PLATELET # BLD AUTO: 233 K/UL (ref 140–440)
PLATELET # BLD AUTO: 248 K/UL (ref 140–440)
PMV BLD AUTO: 11.9 FL (ref 7.5–11.1)
PMV BLD AUTO: 12 FL (ref 7.5–11.1)
POTASSIUM SERPL-SCNC: 5.2 MMOL/L (ref 3.5–5.1)
POTASSIUM SERPL-SCNC: 5.9 MMOL/L (ref 3.5–5.1)
PROT SERPL-MCNC: 7.9 G/DL (ref 6.4–8.2)
PROT UR STRIP-MCNC: 30 MG/DL
RBC # BLD AUTO: 4.97 M/UL (ref 4.6–6.2)
RBC # BLD AUTO: 4.98 M/UL (ref 4.6–6.2)
RBC #/AREA URNS HPF: ABNORMAL /HPF
SODIUM SERPL-SCNC: 142 MMOL/L (ref 136–145)
SODIUM SERPL-SCNC: 145 MMOL/L (ref 136–145)
SP GR UR STRIP: 1.02 (ref 1–1.03)
TROPONIN I SERPL HS-MCNC: 24 NG/L (ref 0–22)
TROPONIN I SERPL HS-MCNC: 26 NG/L (ref 0–22)
UROBILINOGEN UR STRIP-ACNC: 0.2 EU/DL (ref 0–1)
WBC #/AREA URNS HPF: ABNORMAL /HPF
WBC OTHER # BLD: 10.9 K/UL (ref 4–10.5)
WBC OTHER # BLD: 11.7 K/UL (ref 4–10.5)

## 2025-06-05 PROCEDURE — 99285 EMERGENCY DEPT VISIT HI MDM: CPT

## 2025-06-05 PROCEDURE — 93005 ELECTROCARDIOGRAM TRACING: CPT | Performed by: EMERGENCY MEDICINE

## 2025-06-05 PROCEDURE — 2580000003 HC RX 258: Performed by: EMERGENCY MEDICINE

## 2025-06-05 PROCEDURE — 83690 ASSAY OF LIPASE: CPT

## 2025-06-05 PROCEDURE — 96372 THER/PROPH/DIAG INJ SC/IM: CPT

## 2025-06-05 PROCEDURE — 2500000003 HC RX 250 WO HCPCS: Performed by: EMERGENCY MEDICINE

## 2025-06-05 PROCEDURE — 96375 TX/PRO/DX INJ NEW DRUG ADDON: CPT

## 2025-06-05 PROCEDURE — 6360000002 HC RX W HCPCS: Performed by: EMERGENCY MEDICINE

## 2025-06-05 PROCEDURE — 85025 COMPLETE CBC W/AUTO DIFF WBC: CPT

## 2025-06-05 PROCEDURE — 2500000003 HC RX 250 WO HCPCS

## 2025-06-05 PROCEDURE — 80053 COMPREHEN METABOLIC PANEL: CPT

## 2025-06-05 PROCEDURE — 80048 BASIC METABOLIC PNL TOTAL CA: CPT

## 2025-06-05 PROCEDURE — 84484 ASSAY OF TROPONIN QUANT: CPT

## 2025-06-05 PROCEDURE — 96374 THER/PROPH/DIAG INJ IV PUSH: CPT

## 2025-06-05 PROCEDURE — 81001 URINALYSIS AUTO W/SCOPE: CPT

## 2025-06-05 PROCEDURE — 74176 CT ABD & PELVIS W/O CONTRAST: CPT

## 2025-06-05 PROCEDURE — 93010 ELECTROCARDIOGRAM REPORT: CPT | Performed by: INTERNAL MEDICINE

## 2025-06-05 PROCEDURE — 82962 GLUCOSE BLOOD TEST: CPT

## 2025-06-05 RX ORDER — CIPROFLOXACIN 500 MG/1
500 TABLET, FILM COATED ORAL 2 TIMES DAILY
Qty: 14 TABLET | Refills: 0 | Status: SHIPPED | OUTPATIENT
Start: 2025-06-05 | End: 2025-06-12

## 2025-06-05 RX ORDER — ZIPRASIDONE MESYLATE 20 MG/ML
20 INJECTION, POWDER, LYOPHILIZED, FOR SOLUTION INTRAMUSCULAR ONCE
Status: COMPLETED | OUTPATIENT
Start: 2025-06-05 | End: 2025-06-05

## 2025-06-05 RX ORDER — LORAZEPAM 2 MG/ML
2 INJECTION INTRAMUSCULAR ONCE
Status: COMPLETED | OUTPATIENT
Start: 2025-06-05 | End: 2025-06-05

## 2025-06-05 RX ORDER — WATER 10 ML/10ML
INJECTION INTRAMUSCULAR; INTRAVENOUS; SUBCUTANEOUS
Status: COMPLETED
Start: 2025-06-05 | End: 2025-06-05

## 2025-06-05 RX ORDER — HALOPERIDOL 5 MG/ML
10 INJECTION INTRAMUSCULAR ONCE
Status: COMPLETED | OUTPATIENT
Start: 2025-06-05 | End: 2025-06-05

## 2025-06-05 RX ORDER — MIDAZOLAM HYDROCHLORIDE 2 MG/2ML
2 INJECTION, SOLUTION INTRAMUSCULAR; INTRAVENOUS ONCE
Status: COMPLETED | OUTPATIENT
Start: 2025-06-05 | End: 2025-06-05

## 2025-06-05 RX ADMIN — ZIPRASIDONE MESYLATE 20 MG: 20 INJECTION, POWDER, LYOPHILIZED, FOR SOLUTION INTRAMUSCULAR at 01:36

## 2025-06-05 RX ADMIN — ONDANSETRON 4 MG: 2 INJECTION, SOLUTION INTRAMUSCULAR; INTRAVENOUS at 02:46

## 2025-06-05 RX ADMIN — MIDAZOLAM HYDROCHLORIDE 2 MG: 1 INJECTION, SOLUTION INTRAMUSCULAR; INTRAVENOUS at 22:46

## 2025-06-05 RX ADMIN — CEFTRIAXONE SODIUM 1000 MG: 1 INJECTION, POWDER, FOR SOLUTION INTRAMUSCULAR; INTRAVENOUS at 04:40

## 2025-06-05 RX ADMIN — WATER 10 ML: 1 INJECTION INTRAMUSCULAR; INTRAVENOUS; SUBCUTANEOUS at 01:37

## 2025-06-05 RX ADMIN — HALOPERIDOL LACTATE 10 MG: 5 INJECTION, SOLUTION INTRAMUSCULAR at 22:45

## 2025-06-05 RX ADMIN — SODIUM CHLORIDE 1000 ML: 0.9 INJECTION, SOLUTION INTRAVENOUS at 02:44

## 2025-06-05 RX ADMIN — LORAZEPAM 2 MG: 2 INJECTION INTRAMUSCULAR; INTRAVENOUS at 01:36

## 2025-06-05 ASSESSMENT — PAIN SCALES - GENERAL: PAINLEVEL_OUTOF10: 0

## 2025-06-05 ASSESSMENT — ENCOUNTER SYMPTOMS
EYES NEGATIVE: 1
GASTROINTESTINAL NEGATIVE: 1
RESPIRATORY NEGATIVE: 1

## 2025-06-05 ASSESSMENT — PAIN - FUNCTIONAL ASSESSMENT: PAIN_FUNCTIONAL_ASSESSMENT: 0-10

## 2025-06-05 NOTE — ED PROVIDER NOTES
Fatigue  Patient was sent to the emergency department via private ambulance from [facility] because the doctor wanted him to be seen.  The patient had labs that were drawn at the [facility] which showed an increased BUN and creatinine in addition, the patient seemed more fatigued than normal.  Patient has a normal expressive aphasia from previous stroke.  The patient has only been at the [facility] for less than a week after being discharged from [facility] they felt that because the patient was having more fatigue and their labs showed that he had an elevated BUN and creatinine that he should be seen in the emergency department    Review of Systems   Constitutional:  Positive for fatigue.   HENT: Negative.     Eyes: Negative.    Respiratory: Negative.     Cardiovascular: Negative.    Gastrointestinal: Negative.    Genitourinary: Negative.    Musculoskeletal: Negative.    Skin: Negative.    Neurological: Negative.    All other systems reviewed and are negative.      Family History   Problem Relation Age of Onset    High Blood Pressure Mother     Lung Cancer Father     Alzheimer's Disease Father     High Blood Pressure Father     Kidney Disease Father     No Known Problems Sister     No Known Problems Sister     No Known Problems Brother     No Known Problems Brother     No Known Problems Brother     No Known Problems Brother      Social History     Socioeconomic History    Marital status:      Spouse name: Not on file    Number of children: Not on file    Years of education: Not on file    Highest education level: Not on file   Occupational History    Not on file   Tobacco Use    Smoking status: Every Day     Current packs/day: 1.00     Average packs/day: 1 pack/day for 53.4 years (53.4 ttl pk-yrs)     Types: Cigarettes     Start date: 1/1/1972    Smokeless tobacco: Never   Vaping Use    Vaping status: Never Used   Substance and Sexual Activity    Alcohol use: No     Comment: no etoh since 2015 ( hx

## 2025-06-05 NOTE — ED NOTES
Patient transfers from EMT stretcher to ER bed with minimal assist for safety. He awakes to his name and is able to nod or shake his head to answer questions. His verbal communication makes little sense, \"word salad\" intermixed with the occasional appropriate word or phrase, this has been the norm since his stroke. Patient gets into bed and immediately wants to roll over and go back to sleep. RN must coax and prod him to get him to roll over and allow vital signs to be obtained. He eventually complies.

## 2025-06-05 NOTE — ED NOTES
RN enters room to place IV and draw blood. Patient is lying on his side with his arms underneath him resting soundly with eyes closed. RN repeatedly requests, coaxes, attempts to negotiate, to get patient give him his arm to allow RN to place IV (This interaction is approx 20 minutes). Though patient's words are not intelligible, patient is able to make RN understand that he wants to rest, that he wants to go back to [facility], and he wants to be left alone. Despite RN's repeated attempts to convince him that we just want to check him out, to see that he is OK, patient will not cooperate, and finally just covers his head with his blanket to end conversation.     informed of situation.

## 2025-06-05 NOTE — ED NOTES
RN spoke with Aguilar (nurse) at [facility]. He states that Dr Shirley Lim, was worried about patient's labs and increased lethargy, and not eating or drinking well for past 4 days (since being discharged from hospital).

## 2025-06-06 VITALS
SYSTOLIC BLOOD PRESSURE: 113 MMHG | BODY MASS INDEX: 29.49 KG/M2 | DIASTOLIC BLOOD PRESSURE: 65 MMHG | HEIGHT: 70 IN | HEART RATE: 94 BPM | RESPIRATION RATE: 30 BRPM | WEIGHT: 206 LBS | TEMPERATURE: 97.9 F | OXYGEN SATURATION: 98 %

## 2025-06-06 NOTE — ED PROVIDER NOTES
CHIEF COMPLAINT    Chief Complaint   Patient presents with    Abnormal Labs     HPI  Randy Martinez is a 69 y.o. male with history of CVA, expressive aphasia, dementia, COPD who presents to the ED via EMS from his nursing home facility with concerns for worsening confusion/mental status and concerns for abnormal labs.  Patient was hospitalized from May 23 until May 30th of this year secondary to sepsis from pyelonephritis.  Urine culture at that time showed E. coli which was sensitive to multiple antibiotics.  He did have some encephalopathy at that time but does have expressive aphasia and confusion at baseline from previous CVA.  It seems that he typically has aphasia and some episodes of agitation and is typically not able to convey any orientation questions or follow commands at baseline.  He was actually just sent to [place] emergency department earlier this morning on 06/05 with concerns for abnormal laboratory studies obtained by the nursing home.  They were also concern for some possible fatigue for the patient.  His laboratory studies obtained on the morning of 06/05 in emergency department demonstrated mild hyperkalemia at 5.2 although specimen was slightly hemolyzed.  Creatinine was 2.2 with a BUN of 41 which had improved from labs from 05/29 at which time creatinine was 3.5 and BUN is 58.  His CBC was stable at that time as well.  Urinalysis during visit to emergency department earlier this morning was concern for possible UTI.  Urine sent for culture.  Patient was given a dose of Rocephin and discharged on ciprofloxacin.  The patient arrives here unable to provide much history.  However looking at his notes during recent hospitalization as well as ER notes yesterday this is baseline for him.  I did also review findings from early morning visit to ER at which time he had CT of the head which was without acute intracranial pathology      REVIEW OF SYSTEMS  Unable to obtain full review of systems secondary      Comment: states one time per week for pain relief 9/6/24    Sexual activity: Never     Partners: Female     Social Drivers of Health     Financial Resource Strain: Low Risk  (6/25/2024)    Overall Financial Resource Strain (CARDIA)     Difficulty of Paying Living Expenses: Not hard at all   Food Insecurity: No Food Insecurity (5/3/2025)    Received from [facility]    NCSS - Food Insecurity     Worried About Running Out of Food in the Last Year: No     Ran Out of Food in the Last Year: No   Transportation Needs: No Transportation Needs (5/3/2025)    Received from [facility]    NCSS - Transportation     Lack of Transportation: No   Physical Activity: Inactive (9/6/2024)    Exercise Vital Sign     Days of Exercise per Week: 0 days     Minutes of Exercise per Session: 0 min   Stress: Stress Concern Present (3/6/2020)    [employer] - Occupational Stress Questionnaire     Feeling of Stress : Very much   Housing Stability: Not At Risk (5/3/2025)    Received from [facility]    NCSS - Housing/Utilities     Has Housing: Yes     Worried About Losing Housing: No     Unable to Get Utilities: No       SURGICAL HISTORY  Past Surgical History:   Procedure Laterality Date    VASCULAR SURGERY      stent L leg 2013     CURRENT MEDICATIONS  Discharge Medication List as of 6/6/2025 12:26 AM        CONTINUE these medications which have NOT CHANGED    Details   ciprofloxacin (CIPRO) 500 MG tablet Take 1 tablet by mouth 2 times daily for 7 days, Disp-14 tablet, R-0Print      amiodarone (CORDARONE) 200 MG tablet Take 1 tablet by mouth daily, Disp-30 tablet, R-0NO PRINT      apixaban (ELIQUIS) 5 MG TABS tablet Take 1 tablet by mouth 2 times daily, Disp-60 tablet, R-0NO PRINT      lactobacillus (CULTURELLE) capsule Take 1 capsule by mouth daily (with breakfast) for 7 days, Disp-7 capsule, R-0NO PRINT

## 2025-06-06 NOTE — ED NOTES
The following labs were labeled with appropriate pt sticker and tubed to lab:     [x] Blue     [x] Lavender   [] on ice  [x] Green/yellow x2  [] Green/black [] on ice  [x] Grey   [x] on ice  [] Yellow  [x] Red  [] Pink  [] Type/ Screen  [] ABG  [] VBG    [] COVID-19 swab    [] Rapid  [] PCR  [] Flu swab  [] Peds Viral Panel     [] Urine Sample  [] Fecal Sample  [] Pelvic Cultures  [] Blood Cultures  [] X 2  [] STREP Cultures  [] Wound Cultures

## 2025-06-07 ENCOUNTER — APPOINTMENT (OUTPATIENT)
Dept: CT IMAGING | Age: 70
End: 2025-06-07
Payer: MEDICARE

## 2025-06-07 ENCOUNTER — HOSPITAL ENCOUNTER (EMERGENCY)
Age: 70
Discharge: NURSING, REHAB OR LONG-TERM CARE | End: 2025-06-08
Attending: EMERGENCY MEDICINE
Payer: MEDICARE

## 2025-06-07 ENCOUNTER — APPOINTMENT (OUTPATIENT)
Dept: GENERAL RADIOLOGY | Age: 70
End: 2025-06-07
Payer: MEDICARE

## 2025-06-07 VITALS
SYSTOLIC BLOOD PRESSURE: 118 MMHG | OXYGEN SATURATION: 99 % | BODY MASS INDEX: 29.49 KG/M2 | RESPIRATION RATE: 18 BRPM | HEART RATE: 63 BPM | HEIGHT: 70 IN | WEIGHT: 206 LBS | DIASTOLIC BLOOD PRESSURE: 79 MMHG | TEMPERATURE: 98 F

## 2025-06-07 DIAGNOSIS — S09.90XA CLOSED HEAD INJURY, INITIAL ENCOUNTER: Primary | ICD-10-CM

## 2025-06-07 DIAGNOSIS — S40.011A CONTUSION OF RIGHT SHOULDER, INITIAL ENCOUNTER: ICD-10-CM

## 2025-06-07 DIAGNOSIS — S01.81XA FACIAL LACERATION, INITIAL ENCOUNTER: ICD-10-CM

## 2025-06-07 PROCEDURE — 90471 IMMUNIZATION ADMIN: CPT | Performed by: EMERGENCY MEDICINE

## 2025-06-07 PROCEDURE — 90715 TDAP VACCINE 7 YRS/> IM: CPT | Performed by: EMERGENCY MEDICINE

## 2025-06-07 PROCEDURE — 12011 RPR F/E/E/N/L/M 2.5 CM/<: CPT

## 2025-06-07 PROCEDURE — 6360000002 HC RX W HCPCS: Performed by: EMERGENCY MEDICINE

## 2025-06-07 PROCEDURE — 6370000000 HC RX 637 (ALT 250 FOR IP): Performed by: EMERGENCY MEDICINE

## 2025-06-07 PROCEDURE — 70450 CT HEAD/BRAIN W/O DYE: CPT

## 2025-06-07 PROCEDURE — 99284 EMERGENCY DEPT VISIT MOD MDM: CPT

## 2025-06-07 PROCEDURE — 72125 CT NECK SPINE W/O DYE: CPT

## 2025-06-07 PROCEDURE — 70486 CT MAXILLOFACIAL W/O DYE: CPT

## 2025-06-07 PROCEDURE — 73020 X-RAY EXAM OF SHOULDER: CPT

## 2025-06-07 RX ORDER — DIAZEPAM 5 MG/1
5 TABLET ORAL ONCE
Status: DISCONTINUED | OUTPATIENT
Start: 2025-06-07 | End: 2025-06-08 | Stop reason: HOSPADM

## 2025-06-07 RX ORDER — BACITRACIN ZINC 500 [USP'U]/G
OINTMENT TOPICAL ONCE
Status: COMPLETED | OUTPATIENT
Start: 2025-06-07 | End: 2025-06-07

## 2025-06-07 RX ORDER — HALOPERIDOL 5 MG/1
10 TABLET ORAL ONCE
Status: DISCONTINUED | OUTPATIENT
Start: 2025-06-07 | End: 2025-06-08 | Stop reason: HOSPADM

## 2025-06-07 RX ORDER — BUPIVACAINE HYDROCHLORIDE 5 MG/ML
50 INJECTION, SOLUTION EPIDURAL; INTRACAUDAL ONCE
Status: COMPLETED | OUTPATIENT
Start: 2025-06-07 | End: 2025-06-07

## 2025-06-07 RX ADMIN — BUPIVACAINE HYDROCHLORIDE 50 MG: 5 INJECTION, SOLUTION EPIDURAL; INTRACAUDAL; PERINEURAL at 22:46

## 2025-06-07 RX ADMIN — TETANUS TOXOID, REDUCED DIPHTHERIA TOXOID AND ACELLULAR PERTUSSIS VACCINE, ADSORBED 0.5 ML: 5; 2.5; 8; 8; 2.5 SUSPENSION INTRAMUSCULAR at 22:35

## 2025-06-07 RX ADMIN — BACITRACIN ZINC: 500 OINTMENT TOPICAL at 23:44

## 2025-06-07 ASSESSMENT — PAIN - FUNCTIONAL ASSESSMENT: PAIN_FUNCTIONAL_ASSESSMENT: ADULT NONVERBAL PAIN SCALE (NPVS)
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
## 2025-06-21 ENCOUNTER — APPOINTMENT (OUTPATIENT)
Dept: CT IMAGING | Age: 70
End: 2025-06-21
Payer: MEDICARE

## 2025-06-21 ENCOUNTER — HOSPITAL ENCOUNTER (EMERGENCY)
Age: 70
Discharge: HOME | End: 2025-06-21
Payer: MEDICARE

## 2025-06-21 ENCOUNTER — HOSPITAL ENCOUNTER (INPATIENT)
Age: 70
LOS: 2 days | Discharge: NURSING, REHAB OR LONG-TERM CARE | DRG: 690 | End: 2025-06-23
Attending: STUDENT IN AN ORGANIZED HEALTH CARE EDUCATION/TRAINING PROGRAM | Admitting: STUDENT IN AN ORGANIZED HEALTH CARE EDUCATION/TRAINING PROGRAM
Payer: MEDICARE

## 2025-06-21 VITALS
RESPIRATION RATE: 18 BRPM | DIASTOLIC BLOOD PRESSURE: 64 MMHG | SYSTOLIC BLOOD PRESSURE: 111 MMHG | HEIGHT: 70 IN | TEMPERATURE: 97.9 F | BODY MASS INDEX: 29.35 KG/M2 | WEIGHT: 205 LBS | HEART RATE: 61 BPM | OXYGEN SATURATION: 96 %

## 2025-06-21 DIAGNOSIS — I63.9 CEREBROVASCULAR ACCIDENT (CVA), UNSPECIFIED MECHANISM (HCC): Primary | ICD-10-CM

## 2025-06-21 DIAGNOSIS — N39.0 ACUTE UTI: ICD-10-CM

## 2025-06-21 DIAGNOSIS — N28.9 RENAL INSUFFICIENCY: ICD-10-CM

## 2025-06-21 PROBLEM — R29.90 STROKE-LIKE SYMPTOMS: Status: ACTIVE | Noted: 2025-06-21

## 2025-06-21 LAB
ALBUMIN SERPL-MCNC: 3.3 G/DL (ref 3.4–5)
ALBUMIN/GLOB SERPL: 1 {RATIO}
ALP SERPL-CCNC: 95 U/L (ref 40–129)
ALT SERPL-CCNC: 17 U/L (ref 10–40)
ANION GAP SERPL CALCULATED.3IONS-SCNC: 11 MMOL/L (ref 9–17)
AST SERPL-CCNC: 20 U/L (ref 15–37)
BACTERIA URNS QL MICRO: ABNORMAL
BASOPHILS # BLD: 0.02 K/UL
BASOPHILS NFR BLD: 0 % (ref 0–1)
BILIRUB SERPL-MCNC: 0.7 MG/DL (ref 0–1)
BILIRUB UR QL STRIP: NEGATIVE
BUN SERPL-MCNC: 18 MG/DL (ref 7–20)
CALCIUM SERPL-MCNC: 9 MG/DL (ref 8.3–10.6)
CHARACTER UR: ABNORMAL
CHLORIDE SERPL-SCNC: 107 MMOL/L (ref 99–110)
CHP ED QC CHECK: YES
CLARITY UR: ABNORMAL
CO2 SERPL-SCNC: 21 MMOL/L (ref 21–32)
COLOR UR: YELLOW
CREAT SERPL-MCNC: 1.9 MG/DL (ref 0.8–1.3)
EKG ATRIAL RATE: 79 BPM
EKG DIAGNOSIS: NORMAL
EKG P AXIS: 68 DEGREES
EKG P-R INTERVAL: 196 MS
EKG Q-T INTERVAL: 376 MS
EKG QRS DURATION: 92 MS
EKG QTC CALCULATION (BAZETT): 431 MS
EKG R AXIS: 43 DEGREES
EKG T AXIS: 117 DEGREES
EKG VENTRICULAR RATE: 79 BPM
EOSINOPHIL # BLD: 0.17 K/UL
EOSINOPHILS RELATIVE PERCENT: 2 % (ref 0–3)
EPI CELLS #/AREA URNS HPF: ABNORMAL /HPF
ERYTHROCYTE [DISTWIDTH] IN BLOOD BY AUTOMATED COUNT: 15.9 % (ref 11.7–14.9)
GFR, ESTIMATED: 38 ML/MIN/1.73M2
GLUCOSE BLD-MCNC: 113 MG/DL
GLUCOSE BLD-MCNC: 113 MG/DL (ref 74–99)
GLUCOSE SERPL-MCNC: 115 MG/DL (ref 74–99)
GLUCOSE UR STRIP-MCNC: NEGATIVE MG/DL
HCT VFR BLD AUTO: 43.1 % (ref 42–52)
HGB BLD-MCNC: 13.2 G/DL (ref 13.5–18)
HGB UR QL STRIP.AUTO: ABNORMAL
IMM GRANULOCYTES # BLD AUTO: 0.02 K/UL
IMM GRANULOCYTES NFR BLD: 0 %
INR PPP: 1.3
KETONES UR STRIP-MCNC: NEGATIVE MG/DL
LEUKOCYTE ESTERASE UR QL STRIP: ABNORMAL
LYMPHOCYTES NFR BLD: 1.53 K/UL
LYMPHOCYTES RELATIVE PERCENT: 17 % (ref 24–44)
MCH RBC QN AUTO: 29.6 PG (ref 27–31)
MCHC RBC AUTO-ENTMCNC: 30.6 G/DL (ref 32–36)
MCV RBC AUTO: 96.6 FL (ref 78–100)
MICROORGANISM SPEC CULT: NORMAL
MONOCYTES NFR BLD: 0.56 K/UL
MONOCYTES NFR BLD: 6 % (ref 0–5)
NEUTROPHILS NFR BLD: 75 % (ref 36–66)
NEUTS SEG NFR BLD: 6.93 K/UL
NITRITE UR QL STRIP: NEGATIVE
PH UR STRIP: 6 [PH] (ref 5–8)
PLATELET # BLD AUTO: 143 K/UL (ref 140–440)
PMV BLD AUTO: 11.4 FL (ref 7.5–11.1)
POTASSIUM SERPL-SCNC: 4.3 MMOL/L (ref 3.5–5.1)
PROT SERPL-MCNC: 6.7 G/DL (ref 6.4–8.2)
PROT UR STRIP-MCNC: ABNORMAL MG/DL
PROTHROMBIN TIME: 16.5 SEC (ref 11.7–14.5)
RBC # BLD AUTO: 4.46 M/UL (ref 4.6–6.2)
RBC #/AREA URNS HPF: ABNORMAL /HPF
SODIUM SERPL-SCNC: 139 MMOL/L (ref 136–145)
SP GR UR STRIP: 1.02 (ref 1–1.03)
SPECIMEN DESCRIPTION: NORMAL
TROPONIN I SERPL HS-MCNC: 20 NG/L (ref 0–22)
UROBILINOGEN UR STRIP-ACNC: 0.2 EU/DL (ref 0–1)
WBC #/AREA URNS HPF: ABNORMAL /HPF
WBC OTHER # BLD: 9.2 K/UL (ref 4–10.5)
YEAST URNS QL MICRO: PRESENT

## 2025-06-21 PROCEDURE — 6370000000 HC RX 637 (ALT 250 FOR IP): Performed by: STUDENT IN AN ORGANIZED HEALTH CARE EDUCATION/TRAINING PROGRAM

## 2025-06-21 PROCEDURE — 81001 URINALYSIS AUTO W/SCOPE: CPT

## 2025-06-21 PROCEDURE — 74176 CT ABD & PELVIS W/O CONTRAST: CPT

## 2025-06-21 PROCEDURE — 51798 US URINE CAPACITY MEASURE: CPT

## 2025-06-21 PROCEDURE — 85610 PROTHROMBIN TIME: CPT

## 2025-06-21 PROCEDURE — 6360000004 HC RX CONTRAST MEDICATION

## 2025-06-21 PROCEDURE — 6370000000 HC RX 637 (ALT 250 FOR IP): Performed by: NURSE PRACTITIONER

## 2025-06-21 PROCEDURE — 87077 CULTURE AEROBIC IDENTIFY: CPT

## 2025-06-21 PROCEDURE — 6360000002 HC RX W HCPCS

## 2025-06-21 PROCEDURE — 85025 COMPLETE CBC W/AUTO DIFF WBC: CPT

## 2025-06-21 PROCEDURE — 2580000003 HC RX 258

## 2025-06-21 PROCEDURE — 84484 ASSAY OF TROPONIN QUANT: CPT

## 2025-06-21 PROCEDURE — 87086 URINE CULTURE/COLONY COUNT: CPT

## 2025-06-21 PROCEDURE — 96375 TX/PRO/DX INJ NEW DRUG ADDON: CPT

## 2025-06-21 PROCEDURE — 70496 CT ANGIOGRAPHY HEAD: CPT

## 2025-06-21 PROCEDURE — 70498 CT ANGIOGRAPHY NECK: CPT

## 2025-06-21 PROCEDURE — 99285 EMERGENCY DEPT VISIT HI MDM: CPT

## 2025-06-21 PROCEDURE — 93005 ELECTROCARDIOGRAM TRACING: CPT

## 2025-06-21 PROCEDURE — 70450 CT HEAD/BRAIN W/O DYE: CPT

## 2025-06-21 PROCEDURE — 96365 THER/PROPH/DIAG IV INF INIT: CPT

## 2025-06-21 PROCEDURE — 1200000000 HC SEMI PRIVATE

## 2025-06-21 PROCEDURE — 82962 GLUCOSE BLOOD TEST: CPT

## 2025-06-21 PROCEDURE — 80053 COMPREHEN METABOLIC PANEL: CPT

## 2025-06-21 RX ORDER — MIDODRINE HYDROCHLORIDE 2.5 MG/1
TABLET ORAL
COMMUNITY
Start: 2025-05-14

## 2025-06-21 RX ORDER — LORAZEPAM 2 MG/ML
1 INJECTION INTRAMUSCULAR ONCE
Status: COMPLETED | OUTPATIENT
Start: 2025-06-21 | End: 2025-06-21

## 2025-06-21 RX ORDER — BETHANECHOL CHLORIDE 10 MG/1
TABLET ORAL
COMMUNITY
Start: 2025-06-03

## 2025-06-21 RX ORDER — ONDANSETRON 4 MG/1
4 TABLET, ORALLY DISINTEGRATING ORAL EVERY 8 HOURS PRN
Status: DISCONTINUED | OUTPATIENT
Start: 2025-06-21 | End: 2025-06-23 | Stop reason: HOSPADM

## 2025-06-21 RX ORDER — SODIUM CHLORIDE 9 MG/ML
INJECTION, SOLUTION INTRAVENOUS PRN
Status: DISCONTINUED | OUTPATIENT
Start: 2025-06-21 | End: 2025-06-23 | Stop reason: HOSPADM

## 2025-06-21 RX ORDER — SODIUM CHLORIDE 0.9 % (FLUSH) 0.9 %
5-40 SYRINGE (ML) INJECTION EVERY 12 HOURS SCHEDULED
Status: DISCONTINUED | OUTPATIENT
Start: 2025-06-21 | End: 2025-06-23 | Stop reason: HOSPADM

## 2025-06-21 RX ORDER — ATORVASTATIN CALCIUM 40 MG/1
80 TABLET, FILM COATED ORAL NIGHTLY
Status: DISCONTINUED | OUTPATIENT
Start: 2025-06-21 | End: 2025-06-21

## 2025-06-21 RX ORDER — IOPAMIDOL 755 MG/ML
75 INJECTION, SOLUTION INTRAVASCULAR
Status: COMPLETED | OUTPATIENT
Start: 2025-06-21 | End: 2025-06-21

## 2025-06-21 RX ORDER — MIDODRINE HYDROCHLORIDE 5 MG/1
2.5 TABLET ORAL 2 TIMES DAILY WITH MEALS
Status: DISCONTINUED | OUTPATIENT
Start: 2025-06-21 | End: 2025-06-23 | Stop reason: HOSPADM

## 2025-06-21 RX ORDER — TAMSULOSIN HYDROCHLORIDE 0.4 MG/1
0.4 CAPSULE ORAL DAILY
Status: DISCONTINUED | OUTPATIENT
Start: 2025-06-22 | End: 2025-06-23 | Stop reason: HOSPADM

## 2025-06-21 RX ORDER — ATORVASTATIN CALCIUM 40 MG/1
80 TABLET, FILM COATED ORAL NIGHTLY
Status: DISCONTINUED | OUTPATIENT
Start: 2025-06-21 | End: 2025-06-23 | Stop reason: HOSPADM

## 2025-06-21 RX ORDER — POLYETHYLENE GLYCOL 3350 17 G/17G
17 POWDER, FOR SOLUTION ORAL DAILY PRN
Status: DISCONTINUED | OUTPATIENT
Start: 2025-06-21 | End: 2025-06-23 | Stop reason: HOSPADM

## 2025-06-21 RX ORDER — SODIUM CHLORIDE 0.9 % (FLUSH) 0.9 %
5-40 SYRINGE (ML) INJECTION PRN
Status: DISCONTINUED | OUTPATIENT
Start: 2025-06-21 | End: 2025-06-23 | Stop reason: HOSPADM

## 2025-06-21 RX ORDER — ONDANSETRON 2 MG/ML
4 INJECTION INTRAMUSCULAR; INTRAVENOUS EVERY 6 HOURS PRN
Status: DISCONTINUED | OUTPATIENT
Start: 2025-06-21 | End: 2025-06-23 | Stop reason: HOSPADM

## 2025-06-21 RX ADMIN — IOPAMIDOL 75 ML: 755 INJECTION, SOLUTION INTRAVENOUS at 14:05

## 2025-06-21 RX ADMIN — CEFTRIAXONE SODIUM 2000 MG: 2 INJECTION, POWDER, FOR SOLUTION INTRAMUSCULAR; INTRAVENOUS at 15:07

## 2025-06-21 RX ADMIN — LORAZEPAM 1 MG: 2 INJECTION INTRAMUSCULAR; INTRAVENOUS at 16:12

## 2025-06-21 ASSESSMENT — PAIN SCALES - GENERAL: PAINLEVEL_OUTOF10: 0

## 2025-06-21 NOTE — H&P
V2.0  History and Physical      Name:  Randy Martinez /Age/Sex: 1955  (69 y.o. male)   MRN & CSN:  [id number] & [id number] Encounter Date/Time: 2025 7:12 PM EDT   Location:  [address] [place] [address] PCP: Kalani Liu MD       Hospital Day: 1      History of Present Illness:     Chief Complaint: abd pain, worsening expressive aphasia    Randy Martinez is a 69 y.o. male with PMH of atrial fibrillation on Eliquis, CVA with residual expressive aphasia, CKD, BPH has Hayes who presents with abd pain.  Patient came from nursing home with complaints of abdomen pain and complication with his Hayes catheter. Patient presented to [place] ED. Patient had his Hayes catheter exchanged.  Shortly after he was noted to have worsening of his aphasia.  Code stroke was called.  No TNK was given.  Spoke to sister who reports that patient was aware of was happening and speaking some comprehensible words out of mumbling but had worsening of his aphasia at the ED. last well-known was today 1:30 PM.     Patient seen and examined at bedside.  Unable to obtain any history.  Patient had CT head at [place] ED with no acute change.  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  Neurointerventional was consulted, did not recommend any intervention at this time.     Assessment and Plan:   Strokelike symptoms  History of CVA with intracerebral hemorrhage  Last MRI brain 2025 L MCA infarct  Presents with worsening expressive aphasia  Last well-known day of admission 1 PM.  Unable to calculate NIHSS as patient has not following commands .   - On home Eliquis continue  - Neurochecks  -MRI brain without contrast ordered  - consult neurology  - order routine EEG  - c/w home statin    CKD III.  Cr improving had MARGARITA last admission from urinary retention  Today Cr 1.9  - c/w hayes   - monitor I/o  - avoid nephrotoxins    Atrial fibrillation   On home amiodarone, Eliquis, metoprolol  - resume; awaiting home med recs     BANG

## 2025-06-21 NOTE — ED NOTES
Dr Burr requested pt to have ct abd/pelvis prior to coming back to ed. Inocencia ornelas notified.

## 2025-06-21 NOTE — ED PROVIDER NOTES
size. Basal cisterns  and foramen magnum are patent. No midline shift. Continued elevation of left MCA territory infarct. Again seen is cortically based hypodensity within the left frontal, parietal, temporal and insular cortices, which is more conspicuous on the prior exam. Additional patchy areas of hypoattenuation likely representing sequelae of chronic microvascular ischemic change. No new large territory infarct. Internal carotid artery calcifications are noted. Globes are symmetric in size.  Visualized paranasal sinuses and mastoid air cells are well aerated. No suspicious focal lesion of the scalp or bony calvarium. IMPRESSION: Continued evolution of the known left MCA territory infarct. Increased conspicuity of cortically-based hyperdensity in the left cerebrum, likely areas of developing laminar necrosis. No definite acute intracranial hemorrhage. Volume loss with sequela of chronic microvascular ischemic change. Ancillary findings as above. This dictation was created with voice recognition software.  While attempts have  been made to review the dictation as it is transcribed, on occasion the spoken word can be misinterpreted by the technology leading to omissions or inappropriate words, phrases or sentences.  Dictated and Electronically Signed By: Bennett Leal MD [facility] 5/29/2025 16:07        Echo (TTE) limited (PRN contrast/bubble/strain/3D)  Result Date: 5/28/2025    Left Ventricle: Low normal left ventricular systolic function with a visually estimated EF of 50 - 55%. Left ventricle size is normal. Mildly increased wall thickness.   Aortic Valve: Sclerotic but non stenotic aortic valve.   Mitral Valve: There is mild posterior annular calcification noted. Anterior mitral valve leaflet is slightly thickened. Mild regurgitation.   Pericardium: There is evidence of epicardial fat. No pericardial effusion.     Vascular duplex lower extremity venous left  Result Date:  the midline structures is seen. A retention cyst in the right maxillary sinus is seen. The remaining visualized paranasal sinuses, mastoid air cells, and orbits are intact. The calvarium is unremarkable. IMPRESSION: 1.  Evolving left MCA territory infarct with the suggestion of developing laminar necrosis in the affected cortex. 2.  Mild diffuse atrophy and chronic microvascular ischemic changes again seen. This dictation was created with voice recognition software.  While attempts have  been made to review the dictation as it is transcribed, on occasion the spoken word can be misinterpreted by the technology leading to omissions or inappropriate words, phrases or sentences.  Dictated and Electronically Signed By: Philip Samuel MD [facility] 5/23/2025 18:21        CT LUMBAR RECONSTRUCTION WO POST PROCESS  Result Date: 5/23/2025  EXAMINATION: CT LUMBAR RECONSTRUCTION WO POST PROCESS DATE OF EXAM:  5/23/2025 17:50 DEMOGRAPHICS: 69 years old Male INDICATION: fall COMPARISON: No existing relevant imaging study corresponding to the same anatomical region is available. TECHNIQUE: Contiguous axial slices of the lumbar spine were submitted without IV  administration of contrast. Additional coronal and sagittal reformatted images were submitted.    DOSE OPTIMIZATION: CT radiation dose optimization techniques (automated exposure  control, and use of iterative reconstruction techniques, or adjustment of the mA and/or kV according to patient size) were used to limit patient radiation dose. FINDINGS: CT LUMBAR SPINE: The lumbar lordosis is maintained. The vertebral body heights are normal. The mineralization is unremarkable. Anterior and lateral syndesmophytes are seen. No  acute fracture or subluxation is seen. No paravertebral soft tissue abnormality  is seen. The disc space heights are grossly intact. Posterior disc bulges are seen from L3-4 to L5-S1. Mild central canal stenosis at L4-5 and L5-S1 is seen.

## 2025-06-21 NOTE — ED NOTES
Attempted to call [facility] for update on transport. On hold for over 5 mins, will call [facility] to see if request has already been made, if not will request transport.

## 2025-06-21 NOTE — PROGRESS NOTES
4 Eyes Skin Assessment     NAME:  Randy Martinez  YOB: 1955  MEDICAL RECORD NUMBER:  [id number]    The patient is being assessed for  Admission    I agree that at least one RN has performed a thorough Head to Toe Skin Assessment on the patient. ALL assessment sites listed below have been assessed.      Areas assessed by both nurses:    Head, Face, Ears, Shoulders, Back, Chest, Arms, Elbows, Hands, Sacrum. Buttock, Coccyx, Ischium, and Legs. Feet and Heels        Does the Patient have a Wound? No noted wound(s) redness on sacrum and LLE       Shine Prevention initiated by RN: Yes  Wound Care Orders initiated by RN: No    Pressure Injury (Stage 3,4, Unstageable, DTI, NWPT, and Complex wounds) if present, place Wound referral order by RN under : No    New Ostomies, if present place, Ostomy referral order under : No     Nurse 1 eSignature: Electronically signed by ISAURO CHICAS RN on 6/21/25 at 6:56 PM EDT    **SHARE this note so that the co-signing nurse can place an eSignature**    Nurse 2 eSignature: {Esignature:[id number]}

## 2025-06-21 NOTE — ED NOTES
Per Thanh at [facility] dispatch, the other dispatch worker just answered [facility] call for this pt to be transported and they are currently providing the required info to process request. Waiting for eta from [facility]

## 2025-06-22 ENCOUNTER — APPOINTMENT (OUTPATIENT)
Dept: MRI IMAGING | Age: 70
DRG: 690 | End: 2025-06-22
Attending: STUDENT IN AN ORGANIZED HEALTH CARE EDUCATION/TRAINING PROGRAM
Payer: MEDICARE

## 2025-06-22 PROBLEM — R29.90 STROKE-LIKE SYMPTOMS: Status: RESOLVED | Noted: 2025-06-21 | Resolved: 2025-06-22

## 2025-06-22 PROBLEM — R47.02: Status: ACTIVE | Noted: 2025-06-22

## 2025-06-22 PROBLEM — Z86.73 HISTORY OF ISCHEMIC LEFT MCA STROKE: Status: ACTIVE | Noted: 2025-06-22

## 2025-06-22 PROBLEM — G40.89 OTHER SEIZURES (HCC): Status: ACTIVE | Noted: 2025-06-22

## 2025-06-22 LAB
ANION GAP SERPL CALCULATED.3IONS-SCNC: 11 MMOL/L (ref 9–17)
BILIRUB UR QL STRIP: NEGATIVE
BUN SERPL-MCNC: 15 MG/DL (ref 7–20)
CALCIUM SERPL-MCNC: 9.4 MG/DL (ref 8.3–10.6)
CHARACTER UR: ABNORMAL
CHLORIDE SERPL-SCNC: 107 MMOL/L (ref 99–110)
CHOLEST SERPL-MCNC: 115 MG/DL (ref 125–199)
CLARITY UR: ABNORMAL
CO2 SERPL-SCNC: 24 MMOL/L (ref 21–32)
COLOR UR: YELLOW
CREAT SERPL-MCNC: 1.7 MG/DL (ref 0.8–1.3)
CRP SERPL HS-MCNC: 17.8 MG/L (ref 0–5)
ERYTHROCYTE [DISTWIDTH] IN BLOOD BY AUTOMATED COUNT: 16 % (ref 11.7–14.9)
ERYTHROCYTE [SEDIMENTATION RATE] IN BLOOD BY WESTERGREN METHOD: 27 MM/HR (ref 0–20)
EST. AVERAGE GLUCOSE BLD GHB EST-MCNC: 116 MG/DL
GFR, ESTIMATED: 42 ML/MIN/1.73M2
GLUCOSE SERPL-MCNC: 80 MG/DL (ref 74–99)
GLUCOSE UR STRIP-MCNC: NEGATIVE MG/DL
HBA1C MFR BLD: 5.7 % (ref 4.2–6.3)
HCT VFR BLD AUTO: 48.2 % (ref 42–52)
HDLC SERPL-MCNC: 30 MG/DL
HGB BLD-MCNC: 14.4 G/DL (ref 13.5–18)
HGB UR QL STRIP.AUTO: ABNORMAL
KETONES UR STRIP-MCNC: NEGATIVE MG/DL
LDLC SERPL CALC-MCNC: 65 MG/DL
LEUKOCYTE ESTERASE UR QL STRIP: ABNORMAL
MCH RBC QN AUTO: 30.1 PG (ref 27–31)
MCHC RBC AUTO-ENTMCNC: 29.9 G/DL (ref 32–36)
MCV RBC AUTO: 100.6 FL (ref 78–100)
NITRITE UR QL STRIP: NEGATIVE
PH UR STRIP: 5.5 [PH] (ref 5–8)
PLATELET # BLD AUTO: 142 K/UL (ref 140–440)
PMV BLD AUTO: 11.9 FL (ref 7.5–11.1)
POTASSIUM SERPL-SCNC: 4.2 MMOL/L (ref 3.5–5.1)
PROCALCITONIN SERPL-MCNC: 0.08 NG/ML
PROT UR STRIP-MCNC: 30 MG/DL
RBC # BLD AUTO: 4.79 M/UL (ref 4.6–6.2)
RBC #/AREA URNS HPF: 258 /HPF (ref 0–2)
SODIUM SERPL-SCNC: 141 MMOL/L (ref 136–145)
SP GR UR STRIP: 1.02 (ref 1–1.03)
TRIGL SERPL-MCNC: 98 MG/DL
UROBILINOGEN UR STRIP-ACNC: 0.2 EU/DL (ref 0–1)
WBC #/AREA URNS HPF: 385 /HPF (ref 0–5)
WBC OTHER # BLD: 10.1 K/UL (ref 4–10.5)

## 2025-06-22 PROCEDURE — 83036 HEMOGLOBIN GLYCOSYLATED A1C: CPT

## 2025-06-22 PROCEDURE — 70551 MRI BRAIN STEM W/O DYE: CPT

## 2025-06-22 PROCEDURE — 1200000000 HC SEMI PRIVATE

## 2025-06-22 PROCEDURE — 86140 C-REACTIVE PROTEIN: CPT

## 2025-06-22 PROCEDURE — 85027 COMPLETE CBC AUTOMATED: CPT

## 2025-06-22 PROCEDURE — 36415 COLL VENOUS BLD VENIPUNCTURE: CPT

## 2025-06-22 PROCEDURE — 84145 PROCALCITONIN (PCT): CPT

## 2025-06-22 PROCEDURE — 6370000000 HC RX 637 (ALT 250 FOR IP): Performed by: NURSE PRACTITIONER

## 2025-06-22 PROCEDURE — 2500000003 HC RX 250 WO HCPCS: Performed by: STUDENT IN AN ORGANIZED HEALTH CARE EDUCATION/TRAINING PROGRAM

## 2025-06-22 PROCEDURE — 6360000002 HC RX W HCPCS: Performed by: STUDENT IN AN ORGANIZED HEALTH CARE EDUCATION/TRAINING PROGRAM

## 2025-06-22 PROCEDURE — 6370000000 HC RX 637 (ALT 250 FOR IP): Performed by: STUDENT IN AN ORGANIZED HEALTH CARE EDUCATION/TRAINING PROGRAM

## 2025-06-22 PROCEDURE — 95816 EEG AWAKE AND DROWSY: CPT | Performed by: STUDENT IN AN ORGANIZED HEALTH CARE EDUCATION/TRAINING PROGRAM

## 2025-06-22 PROCEDURE — 85652 RBC SED RATE AUTOMATED: CPT

## 2025-06-22 PROCEDURE — 99222 1ST HOSP IP/OBS MODERATE 55: CPT | Performed by: STUDENT IN AN ORGANIZED HEALTH CARE EDUCATION/TRAINING PROGRAM

## 2025-06-22 PROCEDURE — 6370000000 HC RX 637 (ALT 250 FOR IP): Performed by: INTERNAL MEDICINE

## 2025-06-22 PROCEDURE — 80048 BASIC METABOLIC PNL TOTAL CA: CPT

## 2025-06-22 PROCEDURE — 95819 EEG AWAKE AND ASLEEP: CPT

## 2025-06-22 PROCEDURE — 94761 N-INVAS EAR/PLS OXIMETRY MLT: CPT

## 2025-06-22 PROCEDURE — 80061 LIPID PANEL: CPT

## 2025-06-22 RX ORDER — LORAZEPAM 0.5 MG/1
0.5 TABLET ORAL
Status: DISCONTINUED | OUTPATIENT
Start: 2025-06-22 | End: 2025-06-23 | Stop reason: HOSPADM

## 2025-06-22 RX ORDER — LORAZEPAM 0.5 MG/1
0.5 TABLET ORAL ONCE
Status: COMPLETED | OUTPATIENT
Start: 2025-06-22 | End: 2025-06-22

## 2025-06-22 RX ADMIN — TAMSULOSIN HYDROCHLORIDE 0.4 MG: 0.4 CAPSULE ORAL at 09:32

## 2025-06-22 RX ADMIN — MIDODRINE HYDROCHLORIDE 2.5 MG: 5 TABLET ORAL at 17:10

## 2025-06-22 RX ADMIN — SODIUM CHLORIDE, PRESERVATIVE FREE 10 ML: 5 INJECTION INTRAVENOUS at 15:53

## 2025-06-22 RX ADMIN — ATORVASTATIN CALCIUM 80 MG: 40 TABLET, FILM COATED ORAL at 21:00

## 2025-06-22 RX ADMIN — LORAZEPAM 0.5 MG: 0.5 TABLET ORAL at 17:10

## 2025-06-22 RX ADMIN — WATER 1000 MG: 1 INJECTION INTRAMUSCULAR; INTRAVENOUS; SUBCUTANEOUS at 15:52

## 2025-06-22 RX ADMIN — SODIUM CHLORIDE, PRESERVATIVE FREE 10 ML: 5 INJECTION INTRAVENOUS at 21:00

## 2025-06-22 RX ADMIN — MIDODRINE HYDROCHLORIDE 2.5 MG: 5 TABLET ORAL at 09:32

## 2025-06-22 RX ADMIN — APIXABAN 5 MG: 5 TABLET, FILM COATED ORAL at 21:00

## 2025-06-22 RX ADMIN — APIXABAN 5 MG: 5 TABLET, FILM COATED ORAL at 09:32

## 2025-06-22 RX ADMIN — MELATONIN TAB 3 MG 3 MG: 3 TAB at 21:00

## 2025-06-22 ASSESSMENT — PAIN SCALES - GENERAL
PAINLEVEL_OUTOF10: 0
PAINLEVEL_OUTOF10: 0

## 2025-06-22 NOTE — PROGRESS NOTES
Routine inpatient EEG completed.  Epileptologist, Dr. Dodd, notified via [employer].       Electronically signed by Zandra Cook on 6/22/2025 at 10:35 AM
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
## 2025-06-22 NOTE — PROGRESS NOTES
V2.0  [facility] Hospitalist Progress Note      Name:  Randy Martinez /Age/Sex: 1955  (69 y.o. male)   MRN & CSN:  [id number] & [id number] Encounter Date/Time: 2025 8:50 AM EDT    Location:  [address] [place] [address] PCP: Kalani Liu MD       Hospital Day: 2    Assessment and Plan:   Randy Martinez is a 69 y.o. male with pmh as noted below who presents with Stroke-like symptoms      Plan:  Stroke like symptoms  History of CVA with intracerebral hemorrhage  Last MRI brain 2025 L MCA infarct  Presents with worsening expressive aphasia  Last well-known day of admission 1 PM.  Unable to calculate NIHSS as patient has not following commands .  On admission  - On home Eliquis continue  - Neurochecks  - CT head and CTA head and neck, noted below. MRI brain without contrast ordered  - consult neurology  - order routine EEG  - c/w home statin\  -- dysarthria noted, patient is following some commands today.         CKD III.  Cr improving had MARGARITA last admission from urinary retention  Today Cr 1.9  - c/w hayes   - monitor I/o  - avoid nephrotoxins     Atrial fibrillation   On home amiodarone, Eliquis, metoprolol  - resume    BANG aneurysm  Neurointerventional consulted by [facility]  Patient is poor candidate  -Monitor     Urinary retention.  Hx of BPH  Has hayes catheter  Has hematuria likely traumatic  -Monitor     Bacteriuria versus UTI  UA on admission after hayes exchange noted for moderate LE, WBC 51, few bacteria, negative nitrite  No sign of systemic infection  -awaiting urine cultures          Diet ADULT DIET; Regular   DVT Prophylaxis [] Lovenox, []  Heparin, [] SCDs, [] Ambulation,  [x] Eliquis, [] Xarelto  [] Coumadin   Code Status DNR-CCA   Disposition From: Home  Expected Disposition: TBD  Estimated Date of Discharge: 1-2 days  Patient requires continued admission due to neuro workup   Surrogate Decision Maker/ POCHEY Green -sister     Subjective:     Chief Complaint: Stroke-like symptoms     Randy Martinez is

## 2025-06-22 NOTE — PROGRESS NOTES
4 Eyes Skin Assessment     NAME:  Randy Martinez  YOB: 1955  MEDICAL RECORD NUMBER:  [id number]    The patient is being assessed for  Admission    I agree that at least one RN has performed a thorough Head to Toe Skin Assessment on the patient. ALL assessment sites listed below have been assessed.      Areas assessed by both nurses:    Head, Face, Ears, Shoulders, Back, Chest, Arms, Elbows, Hands, Sacrum. Buttock, Coccyx, Ischium, and Legs. Feet and Heels        Does the Patient have a Wound? Yes wound(s) were present on assessment. LDA wound assessment was Initiated and completed by RN       Shine Prevention initiated by RN: Yes  Wound Care Orders initiated by RN: No    Pressure Injury (Stage 3,4, Unstageable, DTI, NWPT, and Complex wounds) if present, place Wound referral order by RN under : No    New Ostomies, if present place, Ostomy referral order under : No     Nurse 1 eSignature: Electronically signed by Martha Pagan RN on 6/22/25 at 3:31 AM EDT    **SHARE this note so that the co-signing nurse can place an eSignature**    Nurse 2 eSignature: Electronically signed by Candido Light RN on 6/22/25 at 3:33 AM EDT

## 2025-06-22 NOTE — PROGRESS NOTES
Contacted [facility], spoke to Gregoria CASTELLANOS over the phone. Information regarding patient were gathered.     - Patient takes pills whole, on regular diet +  thin liquids. But there's a note at the nursing home for patient to be evaluated for swallow eval. Patient passed the swallow eval during this admission (RN assessment).  - Patient ambulates independently.   -Has episodes of sundowning.  -Patient will sometimes sleep on the floor.  -Has history of being combative at the NH.

## 2025-06-22 NOTE — PROCEDURES
ROUTINE ELECTROENCEPHALOGRAM    Identifying Information:  Name: Randy Martinez  MRN: [id number]  : 1955  Interpreting Physician: Asya Dodd DO  Referring Provider: Miko Paiz MD  Procedure Location: Inpatient [facility]     Clinical History:  Randy Martinez is a 69 y.o. male with concern for seizures.     Current Medications:    cefTRIAXone (ROCEPHIN) IV  1,000 mg IntraVENous Q24H    sodium chloride flush  5-40 mL IntraVENous 2 times per day    apixaban  5 mg Oral BID    midodrine  2.5 mg Oral BID WC    tamsulosin  0.4 mg Oral Daily    atorvastatin  80 mg Oral Nightly        Indication:  Rule out seizure/seizure disorder     Technical Summary:  28 channels of EEG were recorded in a digital format on a patient who was reported to be awake and drowsy during the recording. The patient not sleep deprived prior to the EEG.     The PDR consisted of well-developed, well-regulated 8-9Hz alpha activity, maximal over the posterior head regions and reactive to eye opening and closure.     Photic stimulation performed and did not produce any abnormalities. During the recording stage II sleep was not seen.     The EKG lead revealed no rhythm abnormalities.       EEG Interpretation:   This EEG was within normal limits for a patient of this age in the awake and drowsy state. No focal, lateralizing, or epileptiform features were seen during the recording.       Clinical correlation is recommended.    Asya Dodd DO  Epileptologist  2025 5:26 PM

## 2025-06-23 VITALS
OXYGEN SATURATION: 96 % | BODY MASS INDEX: 29.45 KG/M2 | TEMPERATURE: 99 F | WEIGHT: 205.69 LBS | DIASTOLIC BLOOD PRESSURE: 77 MMHG | SYSTOLIC BLOOD PRESSURE: 122 MMHG | HEIGHT: 70 IN | RESPIRATION RATE: 16 BRPM | HEART RATE: 75 BPM

## 2025-06-23 LAB
ANION GAP SERPL CALCULATED.3IONS-SCNC: 9 MMOL/L (ref 9–17)
BASOPHILS # BLD: 0.04 K/UL
BASOPHILS NFR BLD: 0 % (ref 0–1)
BUN SERPL-MCNC: 15 MG/DL (ref 7–20)
CALCIUM SERPL-MCNC: 9.5 MG/DL (ref 8.3–10.6)
CHLORIDE SERPL-SCNC: 104 MMOL/L (ref 99–110)
CO2 SERPL-SCNC: 27 MMOL/L (ref 21–32)
CREAT SERPL-MCNC: 1.6 MG/DL (ref 0.8–1.3)
EKG ATRIAL RATE: 79 BPM
EKG DIAGNOSIS: NORMAL
EKG P AXIS: 68 DEGREES
EKG P-R INTERVAL: 196 MS
EKG Q-T INTERVAL: 376 MS
EKG QRS DURATION: 92 MS
EKG QTC CALCULATION (BAZETT): 431 MS
EKG R AXIS: 43 DEGREES
EKG T AXIS: 117 DEGREES
EKG VENTRICULAR RATE: 79 BPM
EOSINOPHIL # BLD: 0.27 K/UL
EOSINOPHILS RELATIVE PERCENT: 3 % (ref 0–3)
ERYTHROCYTE [DISTWIDTH] IN BLOOD BY AUTOMATED COUNT: 15.9 % (ref 11.7–14.9)
GFR, ESTIMATED: 42 ML/MIN/1.73M2
GLUCOSE SERPL-MCNC: 98 MG/DL (ref 74–99)
HCT VFR BLD AUTO: 44 % (ref 42–52)
HGB BLD-MCNC: 13.6 G/DL (ref 13.5–18)
IMM GRANULOCYTES # BLD AUTO: 0.04 K/UL
IMM GRANULOCYTES NFR BLD: 0 %
LYMPHOCYTES NFR BLD: 2.16 K/UL
LYMPHOCYTES RELATIVE PERCENT: 21 % (ref 24–44)
MCH RBC QN AUTO: 30.3 PG (ref 27–31)
MCHC RBC AUTO-ENTMCNC: 30.9 G/DL (ref 32–36)
MCV RBC AUTO: 98 FL (ref 78–100)
MICROORGANISM SPEC CULT: ABNORMAL
MONOCYTES NFR BLD: 0.75 K/UL
MONOCYTES NFR BLD: 7 % (ref 0–5)
NEUTROPHILS NFR BLD: 68 % (ref 36–66)
NEUTS SEG NFR BLD: 6.98 K/UL
PLATELET # BLD AUTO: 158 K/UL (ref 140–440)
PMV BLD AUTO: 11.5 FL (ref 7.5–11.1)
POTASSIUM SERPL-SCNC: 4.3 MMOL/L (ref 3.5–5.1)
RBC # BLD AUTO: 4.49 M/UL (ref 4.6–6.2)
SODIUM SERPL-SCNC: 139 MMOL/L (ref 136–145)
SPECIMEN DESCRIPTION: ABNORMAL
WBC OTHER # BLD: 10.2 K/UL (ref 4–10.5)

## 2025-06-23 PROCEDURE — 99232 SBSQ HOSP IP/OBS MODERATE 35: CPT | Performed by: NURSE PRACTITIONER

## 2025-06-23 PROCEDURE — 92523 SPEECH SOUND LANG COMPREHEN: CPT

## 2025-06-23 PROCEDURE — 36415 COLL VENOUS BLD VENIPUNCTURE: CPT

## 2025-06-23 PROCEDURE — 94761 N-INVAS EAR/PLS OXIMETRY MLT: CPT

## 2025-06-23 PROCEDURE — 85025 COMPLETE CBC W/AUTO DIFF WBC: CPT

## 2025-06-23 PROCEDURE — 6370000000 HC RX 637 (ALT 250 FOR IP)

## 2025-06-23 PROCEDURE — 97535 SELF CARE MNGMENT TRAINING: CPT

## 2025-06-23 PROCEDURE — 93010 ELECTROCARDIOGRAM REPORT: CPT | Performed by: INTERNAL MEDICINE

## 2025-06-23 PROCEDURE — 6370000000 HC RX 637 (ALT 250 FOR IP): Performed by: STUDENT IN AN ORGANIZED HEALTH CARE EDUCATION/TRAINING PROGRAM

## 2025-06-23 PROCEDURE — 2500000003 HC RX 250 WO HCPCS: Performed by: STUDENT IN AN ORGANIZED HEALTH CARE EDUCATION/TRAINING PROGRAM

## 2025-06-23 PROCEDURE — 80048 BASIC METABOLIC PNL TOTAL CA: CPT

## 2025-06-23 PROCEDURE — 92610 EVALUATE SWALLOWING FUNCTION: CPT

## 2025-06-23 PROCEDURE — 97166 OT EVAL MOD COMPLEX 45 MIN: CPT

## 2025-06-23 RX ORDER — FLUCONAZOLE 200 MG/1
200 TABLET ORAL DAILY
Qty: 14 TABLET | Refills: 0
Start: 2025-06-23 | End: 2025-07-07

## 2025-06-23 RX ORDER — FLUCONAZOLE 100 MG/1
200 TABLET ORAL DAILY
Status: DISCONTINUED | OUTPATIENT
Start: 2025-06-23 | End: 2025-06-23 | Stop reason: HOSPADM

## 2025-06-23 RX ADMIN — FLUCONAZOLE 200 MG: 100 TABLET ORAL at 14:33

## 2025-06-23 RX ADMIN — SODIUM CHLORIDE, PRESERVATIVE FREE 10 ML: 5 INJECTION INTRAVENOUS at 08:52

## 2025-06-23 RX ADMIN — APIXABAN 5 MG: 5 TABLET, FILM COATED ORAL at 08:52

## 2025-06-23 RX ADMIN — TAMSULOSIN HYDROCHLORIDE 0.4 MG: 0.4 CAPSULE ORAL at 08:52

## 2025-06-23 ASSESSMENT — PAIN SCALES - GENERAL: PAINLEVEL_OUTOF10: 0
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
## 2025-06-23 NOTE — CARE COORDINATION
Pt is LTC at [facility]. Plan is to return .  Pt is on a bed hold and will not need precert to return.  LSW spoke with Kaitlin with [facility] and they can take pt back once medically stable.  Packet started.      CM will need FATEEMH to be completed by RN and doctor. If pt is discharged after hours please complete the following.... Call report to  [phone]   Place copy of AVS with both FATEMEH and any written Rx for pain and anxiety in the packet.  Set up transportation with [place] [phone] and call family.

## 2025-06-23 NOTE — DISCHARGE INSTR - COC
Continuity of Care Form    Patient Name: Randy Martinez   :  1955  MRN:  [id number]    Admit date:  2025  Discharge date: 25    Code Status Order: DNR-CCA   Advance Directives:     Admitting Physician:  Miko Paiz MD  PCP: Kalani Liu MD    Discharging Nurse:Fara Adams RN  Discharging Hospital Unit/Room#: 3001/3001-A  Discharging Unit Phone Number:[phone]    Emergency Contact:   Extended Emergency Contact Information  Primary Emergency Contact: Norma Enriquez  Mobile Phone: [phone]  Relation: Brother/Sister  Secondary Emergency Contact: Yulia Cullen  Home Phone: [phone]  Work Phone: [phone]  Mobile Phone: [phone]  Relation: Girlfriend    Past Surgical History:  Past Surgical History:   Procedure Laterality Date    VASCULAR SURGERY      stent L leg        Immunization History:   Immunization History   Administered Date(s) Administered    Influenza Vaccine, unspecified formulation 2017, 10/22/2018    Influenza Virus Vaccine 2017, 10/25/2019    Influenza, AFLURIA (age 3 y+), FLUZONE, (age 6 mo+), Quadv MDV, 0.5mL 10/22/2018    Influenza, FLUBLOK, (age 18 y+), Quadv PF, 0.5mL 2019    Pneumococcal, PPSV23, PNEUMOVAX 23, (age 2y+), SC/IM, 0.5mL 2015, 2019    TDaP, ADACEL (age 10y-64y), BOOSTRIX (age 10y+), IM, 0.5mL 2025       Active Problems:  Patient Active Problem List   Diagnosis Code    Post concussion syndrome F07.81    Primary hypertension I10    COPD (chronic obstructive pulmonary disease) (HCC) J44.9    Leg edema, left R60.0    PTSD (post-traumatic stress disorder) F43.10    PAD (peripheral artery disease) I73.9    Tobacco abuse Z72.0    Mixed hyperlipidemia E78.2    Elevated PSA R97.20    Allergic rhinitis J30.9    Severe sepsis (HCC) A41.9, R65.20    Paroxysmal atrial fibrillation (HCC) I48.0    Expressive aphasia R47.01    Acute ischemic left MCA stroke (HCC) I63.512    Receptive dysphasia R47.02    History of ischemic

## 2025-06-23 NOTE — PLAN OF CARE
Problem: Chronic Conditions and Co-morbidities  Goal: Patient's chronic conditions and co-morbidity symptoms are monitored and maintained or improved  Outcome: Progressing     Problem: Discharge Planning  Goal: Discharge to home or other facility with appropriate resources  Outcome: Progressing     Problem: Safety - Adult  Goal: Free from fall injury  Outcome: Progressing     Problem: Confusion  Goal: Confusion, delirium, dementia, or psychosis is improved or at baseline  Description: INTERVENTIONS:  1. Assess for possible contributors to thought disturbance, including medications, impaired vision or hearing, underlying metabolic abnormalities, dehydration, psychiatric diagnoses, and notify attending LIP  2. [place] high risk fall precautions, as indicated  3. Provide frequent short contacts to provide reality reorientation, refocusing and direction  4. Decrease environmental stimuli, including noise as appropriate  5. Monitor and intervene to maintain adequate nutrition, hydration, elimination, sleep and activity  6. If unable to ensure safety without constant attention obtain sitter and review sitter guidelines with assigned personnel  7. Initiate Psychosocial CNS and Spiritual Care consult, as indicated  Outcome: Progressing

## 2025-06-23 NOTE — PROGRESS NOTES
SLP ALL NOTES  Facility/Department: [facility] 3E  Initial Speech/Language/Cognitive Assessment    NAME: Randy Martinez  : 1955   MRN: [id number]  ADMISSION DATE: 2025  ADMITTING DIAGNOSIS: has Post concussion syndrome; Primary hypertension; COPD (chronic obstructive pulmonary disease) (HCC); Leg edema, left; PTSD (post-traumatic stress disorder); PAD (peripheral artery disease); Tobacco abuse; Mixed hyperlipidemia; Elevated PSA; Allergic rhinitis; Severe sepsis (HCC); Paroxysmal atrial fibrillation (HCC); Expressive aphasia; Acute ischemic left MCA stroke (HCC); Receptive dysphasia; History of ischemic left MCA stroke; and Other seizures ([facility]) on their problem list.  Date of Eval: 2025   Evaluating Therapist: MARCIAL Salvador    RECENT RESULTS  CT OF HEAD/MRI:    IMPRESSION:   1. REDEMONSTRATION OF EVOLVING EARLY CHRONIC PHASE INFARCT LEFT FRONTOPARIETAL   CONVEXITY  2. SUSPECT MORE RECENT LACUNAR INFARCT LEFT POSTERIOR THALAMUS WHICH WAS NOT   SEEN ON PRIOR MRI.    Pain:  Pain Assessment  Pain Assessment: None - Denies Pain  Pain Level: 0    Assessment:  Randy Martinez presents severe expressive and receptive deficits. Pt is speech is clear, however, no usable, coherent speech seen during evaluation. Results of MAST worse this date than during initial evaluation complete at [facility] 5/3/25 with patient unable to follow any commands or answer any yes/no questions this date. Models ineffective. Perseveration noted throughout evaluation. Pt unable to make wants/needs known at this time. Pt does appear intermittently aware of language deficits, displaying some mild frustration while attempting to speak. Pt unable to read simple, written commands. Attempts to improve communication with signing ineffective. Recommend skilled ST during hospitalization and at next level of care following discharge.     Recommendations:  Recommendations  Requires SLP Intervention: Yes  Patient Education:

## 2025-06-23 NOTE — CARE COORDINATION
Pre-procedure checklist reviewed and pre-sedation note complete.    MD aware of maximum contrast dose of 270 mL. Pt is on discharge to return to [facility].   time is 4:30PM.  RN, pt family and Kaitlin informed of  time.  [place] paperwork completed and placed on the packet.  Copy of the AVS with both FATEMEH placed in the packet.

## 2025-06-23 NOTE — PROGRESS NOTES
Facility/Department: [facility] 3E   CLINICAL BEDSIDE SWALLOW EVALUATION    NAME: Randy Martinez  : 1955  MRN: [id number]    ADMISSION DATE: 2025  ADMITTING DIAGNOSIS: has Post concussion syndrome; Primary hypertension; COPD (chronic obstructive pulmonary disease) (HCC); Leg edema, left; PTSD (post-traumatic stress disorder); PAD (peripheral artery disease); Tobacco abuse; Mixed hyperlipidemia; Elevated PSA; Allergic rhinitis; Severe sepsis (HCC); Paroxysmal atrial fibrillation (HCC); Expressive aphasia; Acute ischemic left MCA stroke (HCC); Receptive dysphasia; History of ischemic left MCA stroke; and Other seizures ([facility]) on their problem list.      IMPRESSIONS: Randy Martinez was seen for a bedside swallow evaluation following admission to [facility] for abd pain and worsening aphasia at [facility]. Per chart review, pt was recently seen at [facility] following an L MCA CVA and had residual severe expressive aphasia. Pt unable to provide any history but swallow function noted to be WFL at [facility] and pt on a regular/thin liquid diet per chart. Pertinent medical history includes a-fib, COPD, HTN, and recent CVA.    Pt was positioned upright in chair for evalation with clear vocal quality and strong volitional cough. Pt unable to follow commands for oral mechanism exam, however, no focal orofacial deficits noted during evaluation, edentulous. PO trials of thin liquids via cup/straw and regular solids were given. Oropharyngeal swallow appears grossly WFL with adequate mastication, formation and oral clearance. No overt s/s of aspiration with any consistencies trialed and clear voicing appreciated throughout.     Recommend diet or regular/thin liquids - straws ok. Medications as tolerated. General aspiration precautions. RN notified. Pt educated on recommendations. No further skilled ST warranted. SLP team to sign off.        Date of Eval: 2025  Evaluating Therapist: MARCIAL Salvador    Current Diet level:  Current Diet :

## 2025-06-23 NOTE — DISCHARGE SUMMARY
V2.0  Discharge Summary    Name:  Randy Martinez /Age/Sex: 1955 (69 y.o. male)   Admit Date: 2025  Discharge Date: 25    MRN & CSN:  [id number] & [id number] Encounter Date and Time 25 2:26 PM EDT    Attending:  Geoffrey Parmar MD Discharging Provider: Melanie Fierro APRN - CNP       Hospital Course:     Brief HPI: Randy Martinez is a 69 y.o. male who presented with ***    Brief Problem Based Course:   ***      The patient expressed appropriate understanding of, and agreement with the discharge recommendations, medications, and plan.     Consults this admission:  IP CONSULT TO NEUROLOGY    Discharge Diagnosis:   Receptive dysphasia    ***    Discharge Instruction:   Follow up appointments: ***  Primary care physician: Kalani Liu MD within 2 weeks  Diet: {diet:23119}   Activity: {discharge activity:18385}  Disposition: Discharged to:   []Home, [][facility], []SNF, []Acute Rehab, []Other Acute Care Facility, []Hospice   Condition on discharge: Stable  Labs and Tests to be Followed up as an outpatient by PCP or Specialist: ***    Discharge Medications:        Medication List        START taking these medications      fluconazole 200 MG tablet  Commonly known as: DIFLUCAN  Take 1 tablet by mouth daily for 14 doses            CONTINUE taking these medications      albuterol sulfate  (90 Base) MCG/ACT inhaler  Commonly known as: PROVENTIL;VENTOLIN;PROAIR  INHALE TWO (2) PUFFS BY MOUTH FOUR TIMES A DAY AS NEEDED FOR WHEEZING     apixaban 5 MG Tabs tablet  Commonly known as: ELIQUIS  Take 1 tablet by mouth 2 times daily     bethanechol 10 MG tablet  Commonly known as: URECHOLINE     fluticasone 44 MCG/ACT inhaler  Commonly known as: Flovent HFA  Inhale 2 puffs into the lungs 2 times daily     ipratropium 0.03 % nasal spray  Commonly known as: ATROVENT  INSTILL TWO (2) SPRAYS INTRANASALLY DAILY     metoprolol succinate 25 MG extended release tablet  Commonly known as: TOPROL XL  Take 1

## 2025-06-23 NOTE — DISCHARGE INSTR - DIET

## 2025-06-23 NOTE — PLAN OF CARE
Problem: Chronic Conditions and Co-morbidities  Goal: Patient's chronic conditions and co-morbidity symptoms are monitored and maintained or improved  6/23/2025 1101 by Fara Adams RN  Outcome: Progressing  6/22/2025 2133 by Martha Pagan RN  Outcome: Progressing     Problem: Discharge Planning  Goal: Discharge to home or other facility with appropriate resources  6/23/2025 1101 by Fara Adams RN  Outcome: Progressing  6/22/2025 2133 by Martha Pagan RN  Outcome: Progressing     Problem: Safety - Adult  Goal: Free from fall injury  6/23/2025 1101 by Fara Adams RN  Outcome: Progressing  6/22/2025 2133 by Martha Pagan RN  Outcome: Progressing     Problem: Confusion  Goal: Confusion, delirium, dementia, or psychosis is improved or at baseline  Description: INTERVENTIONS:  1. Assess for possible contributors to thought disturbance, including medications, impaired vision or hearing, underlying metabolic abnormalities, dehydration, psychiatric diagnoses, and notify attending LIP  2. [place] high risk fall precautions, as indicated  3. Provide frequent short contacts to provide reality reorientation, refocusing and direction  4. Decrease environmental stimuli, including noise as appropriate  5. Monitor and intervene to maintain adequate nutrition, hydration, elimination, sleep and activity  6. If unable to ensure safety without constant attention obtain sitter and review sitter guidelines with assigned personnel  7. Initiate Psychosocial CNS and Spiritual Care consult, as indicated  6/23/2025 1101 by Fara Adams RN  Outcome: Progressing  6/22/2025 2133 by Martha Pagan RN  Outcome: Progressing

## 2025-06-24 ENCOUNTER — TELEPHONE (OUTPATIENT)
Age: 70
End: 2025-06-24

## 2025-06-24 ENCOUNTER — RESULTS FOLLOW-UP (OUTPATIENT)
Dept: EMERGENCY DEPT | Age: 70
End: 2025-06-24

## 2025-06-26 ENCOUNTER — TELEPHONE (OUTPATIENT)
Age: 70
End: 2025-06-26

## 2025-06-26 NOTE — PROGRESS NOTES
Physician Progress Note      PATIENT:               BINH JACKSON  CSN #:                  [id number]  :                       1955  ADMIT DATE:       2025 6:43 PM  DISCH DATE:        2025 5:26 PM  RESPONDING  PROVIDER #:        Geoffrey Parmar MD          QUERY TEXT:    UTI is documented in the medical record PN  Recrudescence of strokelike   symptoms due to abdominal pain and concern for UTI superimposed on decreased   cognitive reserve.  Please clarify the relationship, if any, with the UTI:    The clinical indicators include:   PN-  Bacteriuria versus UTI  UA on admission after hayes exchange noted for moderate LE, WBC 51, few   bacteria, negative nitrite  No sign of systemic infection  -awaiting urine cultures     neurology- Recrudescence of strokelike symptoms due to abdominal pain and   concern for UTI superimposed on decreased cognitive reserve  Options provided:  -- Strokelike symptoms related to abdominal pain and UTI  -- Strokelike symptoms related to abdominal pain and UTI ruled out  -- Strokelike symptoms not related to abdominal pain and UTI  -- Strokelike symptoms not related to abdominal pain and UTI ruled out  -- Other - I will add my own diagnosis  -- Disagree - Not applicable / Not valid  -- Disagree - Clinically unable to determine / Unknown  -- Refer to Clinical Documentation Reviewer    PROVIDER RESPONSE TEXT:    Strokelike symptoms related to abdominal pain and UTI    Query created by: Caron Pelaez on 2025 9:29 AM      Electronically signed by:  Geoffrey Parmar MD 2025 8:23 AM

## 2025-07-11 NOTE — PROGRESS NOTES
MRN: [id number]  Name: Randy Martinez  : 1955    Insurance: Payor: [facility] MEDICARE /  /  /      Phone #: [phone]  Provider: RENE Perdomo CNP     Date of Visit: 2025    Reason for visit: F/U FROM HOSPITAL  Recent Hospitalization Date:    Reason for Hospitalization:    Last EK  Type of Device:       Vitals BP HR O2% WT HT ORTHO BP LYING ORTHO BP SITTING ORTHO BP SITTING   Today's Findings           Patients work up- Check List     Testing Last Date Completed Date Expected  ([facility]) Additional Notes    MA to document For provider to complete Either MA or Provider    Carotid Duplex  STAT 1 WK 6 MTH       THIS WK 2 WK 1 YEAR     Cardiac CTA  STAT 1 WK 6 MTH       THIS WK 2 WK 1 YEAR     Cardiac CT Calcium scoring  STAT 1 WK 6 MTH       THIS WK 2 WK 1 YEAR     CTA Chest, Abdomen & Pelvis  STAT 1 WK 6 MTH       THIS WK 2 WK 1 YEAR     CT Chest IV w/ Contrast  STAT 1 WK 6 MTH       THIS WK 2 WK 1 YEAR     CT Chest w/o Contrast  STAT 1 WK 6 MTH       THIS WK 2 WK 1 YEAR     CXR  STAT 1 WK 6 MTH       THIS WK 2 WK 1 YEAR     ECHO  Stress Complete Limited     MRI- Cardiac  STAT 1 WK 6 MTH       THIS WK 2 WK 1 YEAR     MUGA Scan  STAT 1 WK 6 MTH       THIS WK 2 WK 1 YEAR     Nuclear Stress  Lexiscan Cardiolite     PFT  STAT 1 WK 6 MTH       THIS WK 2 WK 1 YEAR     Treadmill Stress Test  STAT 1 WK 6 MTH       THIS WK 2 WK 1 YEAR     Vascular Duplex  Lower: Right Left Bilat       Upper: Right Left Bilat     Other Test Not Listed:    Monitors Last Date Completed Day's Request/Ordered     Holter  Short term 24 hours 48 hours      Long term 3 days 7 days 14 days   Event   (1-30 days)      Procedures Last Date Performed Procedure Details Date Expected   Additional Notes    ASD Closure        Carotid Angio        Cardioversion        Heart Cath  R L R&L      Peripheral Angio  R L      PFO Closure        PTCA/PCI        MINDA        MINDA/Cardioversion        Venogram        Tilt Table

## 2025-07-17 ENCOUNTER — OFFICE VISIT (OUTPATIENT)
Dept: CARDIOLOGY CLINIC | Age: 70
End: 2025-07-17
Payer: MEDICARE

## 2025-07-17 VITALS — HEART RATE: 60 BPM | SYSTOLIC BLOOD PRESSURE: 142 MMHG | DIASTOLIC BLOOD PRESSURE: 90 MMHG

## 2025-07-17 DIAGNOSIS — E78.2 MIXED HYPERLIPIDEMIA: ICD-10-CM

## 2025-07-17 DIAGNOSIS — I10 PRIMARY HYPERTENSION: ICD-10-CM

## 2025-07-17 DIAGNOSIS — I48.0 PAROXYSMAL ATRIAL FIBRILLATION (HCC): Primary | ICD-10-CM

## 2025-07-17 PROCEDURE — G8417 CALC BMI ABV UP PARAM F/U: HCPCS | Performed by: NURSE PRACTITIONER

## 2025-07-17 PROCEDURE — 3077F SYST BP >= 140 MM HG: CPT | Performed by: NURSE PRACTITIONER

## 2025-07-17 PROCEDURE — G8427 DOCREV CUR MEDS BY ELIG CLIN: HCPCS | Performed by: NURSE PRACTITIONER

## 2025-07-17 PROCEDURE — 3080F DIAST BP >= 90 MM HG: CPT | Performed by: NURSE PRACTITIONER

## 2025-07-17 PROCEDURE — 1123F ACP DISCUSS/DSCN MKR DOCD: CPT | Performed by: NURSE PRACTITIONER

## 2025-07-17 PROCEDURE — 99214 OFFICE O/P EST MOD 30 MIN: CPT | Performed by: NURSE PRACTITIONER

## 2025-07-17 PROCEDURE — 3017F COLORECTAL CA SCREEN DOC REV: CPT | Performed by: NURSE PRACTITIONER

## 2025-07-17 PROCEDURE — 4004F PT TOBACCO SCREEN RCVD TLK: CPT | Performed by: NURSE PRACTITIONER

## 2025-07-17 PROCEDURE — 1111F DSCHRG MED/CURRENT MED MERGE: CPT | Performed by: NURSE PRACTITIONER

## 2025-07-17 PROCEDURE — 1159F MED LIST DOCD IN RCRD: CPT | Performed by: NURSE PRACTITIONER

## 2025-07-17 RX ORDER — BUSPIRONE HYDROCHLORIDE 15 MG/1
TABLET ORAL
COMMUNITY
Start: 2025-05-14

## 2025-07-17 RX ORDER — CLOPIDOGREL BISULFATE 75 MG/1
TABLET ORAL
COMMUNITY
Start: 2025-07-01

## 2025-07-17 NOTE — ASSESSMENT & PLAN NOTE
-At or near goal Yes LDL- 65  -He is to continue current medications (Crestor 10 mg) Hepatic function panel WNL. No abdominal pain. No myalgias.     -The nature of cardiac risk has been fully discussed with this patient. I have made him aware of his LDL target goal given his cardiovascular risk analysis. I have discussed the appropriate diet. The need for lifelong compliance in order to reduce risk is stressed. A regular exercise program is recommended to help achieve and maintain normal body weight, fitness and improve lipid balance.

## 2025-07-17 NOTE — PROGRESS NOTES
CLINICAL STAFF DOCUMENTATION    William Gamboa, MAE     Randy Martinez  1955  [id number]    Have you had any Chest Pain recently? - No        Have you had any Shortness of Breath - No      Have you had any dizziness - No  Have you had any palpitations recently? - No    Do you have any edema - swelling in No      When did you have your last labs drawn 06/2025  What doctor ordered Ciba   Do we have the labs in their chart Yes    Do you need any prescriptions refilled? - No    Do you have a surgery or procedure scheduled in the near future - No    Do use tobacco products? - No  Do you drink alcohol? - No  Do you use any illicit drugs? - No  Caffeine? - coffee [facility]       Check medication list thoroughly!!! AND RECONCILE OUTSIDE MEDICATIONS  If dose has changed change the entire order not just the MG  BE SURE TO ASK PATIENT IF THEY NEED MEDICATION REFILLS  Verify Pharmacy and update if incorrect    Add to every patient's \"wrap up\" the following dot phrase AFTERVISITCARDIOHEARTHOUSE and ensure we explain this to our patients

## 2025-07-17 NOTE — PROGRESS NOTES
[place] [address]  Phone: [phone]    Fax [phone]    Rajiv Bradley MD, [facility]  Mike Hill MD, [facility]   Sergio Zambrano MD, [facility] MD Nina Sun MD, [facility]  Chato Dash MD, [facility]    Tati Osorio MD, [facility]   Barbie Pat, APRN  Mohini Alvarez, APRN  Cindy Robles, APRN  William Gamboa, APRN        Cardiology Progress Note      7/17/2025    RE: Randy Martinez  (1955)                             Primary cardiologist: Dr. Santos Arango       Subjective: patient has expressive aphasia    CC:   1. Paroxysmal atrial fibrillation (HCC)    2. Primary hypertension    3. Mixed hyperlipidemia        HPI: Randy Martinez, who is a  69 y.o. year old male who presents to the office for follow up on PAF, HTN, and hyperlipidemia.  Patient was hospitalized in May 2025 for severe sepsis secondary to polynephritis and had acute CVA. He had a left MCA infarct first seen in May. Then had further AMS with readmission to hospital 7/1/25 which showed a suspected lacunar infarct of left posterior thalamus.  During first hospital stay in May, patient had new onset of atrial fibrillation with rapid ventricular response and was started on amiodarone drip which was changed to p.o.  Patient is  an active male who walks regularly. Patient is  compliant with medications.        Current Outpatient Medications   Medication Sig Dispense Refill    midodrine (PROAMATINE) 2.5 MG tablet Give 1 tablet by mouth two times a day for hypotension      bethanechol (URECHOLINE) 10 MG tablet Take by mouth      amiodarone (CORDARONE) 200 MG tablet Take 1 tablet by mouth daily (Patient not taking: Reported on 6/22/2025) 30 tablet 0    apixaban (ELIQUIS) 5 MG TABS tablet Take 1 tablet by mouth 2 times daily 60 tablet 0    metoprolol succinate (TOPROL XL) 25 MG extended release tablet Take 1 tablet by mouth daily 30 tablet 3    rosuvastatin (CRESTOR) 10 MG tablet Take

## 2025-08-11 ENCOUNTER — TELEPHONE (OUTPATIENT)
Dept: CARDIOLOGY CLINIC | Age: 70
End: 2025-08-11

## 2025-08-27 ENCOUNTER — APPOINTMENT (OUTPATIENT)
Dept: GENERAL RADIOLOGY | Age: 70
End: 2025-08-27
Payer: MEDICARE

## 2025-08-27 ENCOUNTER — HOSPITAL ENCOUNTER (EMERGENCY)
Age: 70
Discharge: PSYCHIATRIC OR SUBSTANCE USE FACILITY | End: 2025-08-28
Attending: EMERGENCY MEDICINE
Payer: MEDICARE

## 2025-08-27 ENCOUNTER — HOSPITAL ENCOUNTER (EMERGENCY)
Age: 70
Discharge: HOME | End: 2025-08-27
Payer: MEDICARE

## 2025-08-27 VITALS
RESPIRATION RATE: 16 BRPM | SYSTOLIC BLOOD PRESSURE: 138 MMHG | TEMPERATURE: 97.9 F | HEART RATE: 100 BPM | OXYGEN SATURATION: 96 % | DIASTOLIC BLOOD PRESSURE: 80 MMHG

## 2025-08-27 VITALS
BODY MASS INDEX: 30.56 KG/M2 | WEIGHT: 213 LBS | DIASTOLIC BLOOD PRESSURE: 81 MMHG | TEMPERATURE: 98.6 F | OXYGEN SATURATION: 94 % | RESPIRATION RATE: 19 BRPM | HEART RATE: 83 BPM | SYSTOLIC BLOOD PRESSURE: 132 MMHG

## 2025-08-27 DIAGNOSIS — F01.52 VASCULAR DEMENTIA WITH PSYCHOTIC DISTURBANCE, UNSPECIFIED DEMENTIA SEVERITY (HCC): ICD-10-CM

## 2025-08-27 DIAGNOSIS — R46.89 AGGRESSIVE BEHAVIOR: ICD-10-CM

## 2025-08-27 DIAGNOSIS — R03.0 ELEVATED BLOOD PRESSURE READING: Primary | ICD-10-CM

## 2025-08-27 DIAGNOSIS — R44.3 HALLUCINATIONS: Primary | ICD-10-CM

## 2025-08-27 PROBLEM — F29 PSYCHOSIS (HCC): Status: ACTIVE | Noted: 2025-08-27

## 2025-08-27 LAB
ALBUMIN SERPL-MCNC: 4 G/DL (ref 3.4–5)
ALBUMIN SERPL-MCNC: 4.1 G/DL (ref 3.4–5)
ALBUMIN/GLOB SERPL: 1.3 {RATIO}
ALBUMIN/GLOB SERPL: 1.3 {RATIO}
ALP SERPL-CCNC: 88 U/L (ref 40–129)
ALP SERPL-CCNC: 90 U/L (ref 40–129)
ALT SERPL-CCNC: 12 U/L (ref 10–40)
ALT SERPL-CCNC: 15 U/L (ref 10–40)
ANION GAP SERPL CALCULATED.3IONS-SCNC: 10 MMOL/L (ref 9–17)
ANION GAP SERPL CALCULATED.3IONS-SCNC: 12 MMOL/L (ref 9–17)
APAP SERPL-MCNC: <5 UG/ML (ref 10–30)
AST SERPL-CCNC: 18 U/L (ref 15–37)
AST SERPL-CCNC: 19 U/L (ref 15–37)
BASOPHILS # BLD: 0.02 K/UL
BASOPHILS NFR BLD: 0 % (ref 0–1)
BILIRUB SERPL-MCNC: 0.4 MG/DL (ref 0–1)
BILIRUB SERPL-MCNC: 0.6 MG/DL (ref 0–1)
BILIRUB UR QL STRIP: NEGATIVE
BUN SERPL-MCNC: 18 MG/DL (ref 7–20)
BUN SERPL-MCNC: 21 MG/DL (ref 7–20)
CALCIUM SERPL-MCNC: 9.6 MG/DL (ref 8.3–10.6)
CALCIUM SERPL-MCNC: 9.9 MG/DL (ref 8.3–10.6)
CHLORIDE SERPL-SCNC: 107 MMOL/L (ref 99–110)
CHLORIDE SERPL-SCNC: 108 MMOL/L (ref 99–110)
CLARITY UR: CLEAR
CO2 SERPL-SCNC: 21 MMOL/L (ref 21–32)
CO2 SERPL-SCNC: 24 MMOL/L (ref 21–32)
COLOR UR: YELLOW
COMMENT: NORMAL
CREAT SERPL-MCNC: 1.4 MG/DL (ref 0.8–1.3)
CREAT SERPL-MCNC: 1.4 MG/DL (ref 0.8–1.3)
EKG ATRIAL RATE: 89 BPM
EKG DIAGNOSIS: NORMAL
EKG P AXIS: 49 DEGREES
EKG P-R INTERVAL: 202 MS
EKG Q-T INTERVAL: 360 MS
EKG QRS DURATION: 92 MS
EKG QTC CALCULATION (BAZETT): 438 MS
EKG R AXIS: 27 DEGREES
EKG T AXIS: 67 DEGREES
EKG VENTRICULAR RATE: 89 BPM
EOSINOPHIL # BLD: 0.09 K/UL
EOSINOPHILS RELATIVE PERCENT: 1 % (ref 0–3)
ERYTHROCYTE [DISTWIDTH] IN BLOOD BY AUTOMATED COUNT: 15.9 % (ref 11.7–14.9)
ERYTHROCYTE [DISTWIDTH] IN BLOOD BY AUTOMATED COUNT: 15.9 % (ref 11.7–14.9)
ETHANOLAMINE SERPL-MCNC: <10 MG/DL (ref 0–0.08)
GFR, ESTIMATED: 56 ML/MIN/1.73M2
GFR, ESTIMATED: 58 ML/MIN/1.73M2
GLUCOSE SERPL-MCNC: 106 MG/DL (ref 74–99)
GLUCOSE SERPL-MCNC: 117 MG/DL (ref 74–99)
GLUCOSE UR STRIP-MCNC: NEGATIVE MG/DL
HCT VFR BLD AUTO: 41.3 % (ref 42–52)
HCT VFR BLD AUTO: 41.5 % (ref 42–52)
HGB BLD-MCNC: 12.8 G/DL (ref 13.5–18)
HGB BLD-MCNC: 13.2 G/DL (ref 13.5–18)
HGB UR QL STRIP.AUTO: NEGATIVE
IMM GRANULOCYTES # BLD AUTO: 0.02 K/UL
IMM GRANULOCYTES NFR BLD: 0 %
KETONES UR STRIP-MCNC: NEGATIVE MG/DL
LEUKOCYTE ESTERASE UR QL STRIP: NEGATIVE
LYMPHOCYTES NFR BLD: 1.66 K/UL
LYMPHOCYTES RELATIVE PERCENT: 23 % (ref 24–44)
MCH RBC QN AUTO: 30.8 PG (ref 27–31)
MCH RBC QN AUTO: 31.1 PG (ref 27–31)
MCHC RBC AUTO-ENTMCNC: 31 G/DL (ref 32–36)
MCHC RBC AUTO-ENTMCNC: 31.8 G/DL (ref 32–36)
MCV RBC AUTO: 97.6 FL (ref 78–100)
MCV RBC AUTO: 99.5 FL (ref 78–100)
MONOCYTES NFR BLD: 0.48 K/UL
MONOCYTES NFR BLD: 7 % (ref 0–5)
NEUTROPHILS NFR BLD: 69 % (ref 36–66)
NEUTS SEG NFR BLD: 5.08 K/UL
NITRITE UR QL STRIP: NEGATIVE
PH UR STRIP: 6 [PH] (ref 5–8)
PLATELET # BLD AUTO: 175 K/UL (ref 140–440)
PLATELET # BLD AUTO: 183 K/UL (ref 140–440)
PMV BLD AUTO: 10.8 FL (ref 7.5–11.1)
PMV BLD AUTO: 10.8 FL (ref 7.5–11.1)
POTASSIUM SERPL-SCNC: 4.2 MMOL/L (ref 3.5–5.1)
POTASSIUM SERPL-SCNC: 4.5 MMOL/L (ref 3.5–5.1)
PROT SERPL-MCNC: 7.1 G/DL (ref 6.4–8.2)
PROT SERPL-MCNC: 7.3 G/DL (ref 6.4–8.2)
PROT UR STRIP-MCNC: NEGATIVE MG/DL
RBC # BLD AUTO: 4.15 M/UL (ref 4.6–6.2)
RBC # BLD AUTO: 4.25 M/UL (ref 4.6–6.2)
SALICYLATES SERPL-MCNC: <0.5 MG/DL (ref 15–30)
SODIUM SERPL-SCNC: 141 MMOL/L (ref 136–145)
SODIUM SERPL-SCNC: 142 MMOL/L (ref 136–145)
SP GR UR STRIP: 1.01 (ref 1–1.03)
TROPONIN I SERPL HS-MCNC: 23 NG/L (ref 0–22)
TROPONIN I SERPL HS-MCNC: 25 NG/L (ref 0–22)
UROBILINOGEN UR STRIP-ACNC: 0.2 EU/DL (ref 0–1)
WBC OTHER # BLD: 7.4 K/UL (ref 4–10.5)
WBC OTHER # BLD: 8.5 K/UL (ref 4–10.5)

## 2025-08-27 PROCEDURE — 6360000002 HC RX W HCPCS: Performed by: EMERGENCY MEDICINE

## 2025-08-27 PROCEDURE — 71045 X-RAY EXAM CHEST 1 VIEW: CPT

## 2025-08-27 PROCEDURE — 96372 THER/PROPH/DIAG INJ SC/IM: CPT

## 2025-08-27 PROCEDURE — 80143 DRUG ASSAY ACETAMINOPHEN: CPT

## 2025-08-27 PROCEDURE — 90792 PSYCH DIAG EVAL W/MED SRVCS: CPT | Performed by: NURSE PRACTITIONER

## 2025-08-27 PROCEDURE — 84484 ASSAY OF TROPONIN QUANT: CPT

## 2025-08-27 PROCEDURE — G0480 DRUG TEST DEF 1-7 CLASSES: HCPCS

## 2025-08-27 PROCEDURE — 93005 ELECTROCARDIOGRAM TRACING: CPT

## 2025-08-27 PROCEDURE — 80053 COMPREHEN METABOLIC PANEL: CPT

## 2025-08-27 PROCEDURE — 99285 EMERGENCY DEPT VISIT HI MDM: CPT

## 2025-08-27 PROCEDURE — 80179 DRUG ASSAY SALICYLATE: CPT

## 2025-08-27 PROCEDURE — 81003 URINALYSIS AUTO W/O SCOPE: CPT

## 2025-08-27 PROCEDURE — 85027 COMPLETE CBC AUTOMATED: CPT

## 2025-08-27 PROCEDURE — 93010 ELECTROCARDIOGRAM REPORT: CPT | Performed by: INTERNAL MEDICINE

## 2025-08-27 PROCEDURE — 85025 COMPLETE CBC W/AUTO DIFF WBC: CPT

## 2025-08-27 RX ORDER — LORAZEPAM 2 MG/ML
2 INJECTION INTRAMUSCULAR ONCE
Status: COMPLETED | OUTPATIENT
Start: 2025-08-27 | End: 2025-08-27

## 2025-08-27 RX ORDER — BUPROPION HYDROCHLORIDE 150 MG/1
150 TABLET ORAL ONCE
Status: DISCONTINUED | OUTPATIENT
Start: 2025-08-27 | End: 2025-08-27 | Stop reason: HOSPADM

## 2025-08-27 RX ORDER — ZIPRASIDONE MESYLATE 20 MG/ML
20 INJECTION, POWDER, LYOPHILIZED, FOR SOLUTION INTRAMUSCULAR ONCE
Status: COMPLETED | OUTPATIENT
Start: 2025-08-27 | End: 2025-08-27

## 2025-08-27 RX ORDER — BUSPIRONE HYDROCHLORIDE 15 MG/1
15 TABLET ORAL ONCE
Status: DISCONTINUED | OUTPATIENT
Start: 2025-08-27 | End: 2025-08-27 | Stop reason: HOSPADM

## 2025-08-27 RX ADMIN — ZIPRASIDONE MESYLATE 20 MG: 20 INJECTION, POWDER, LYOPHILIZED, FOR SOLUTION INTRAMUSCULAR at 23:12

## 2025-08-27 RX ADMIN — LORAZEPAM 2 MG: 2 INJECTION INTRAMUSCULAR; INTRAVENOUS at 23:13

## 2025-08-27 ASSESSMENT — PAIN SCALES - GENERAL: PAINLEVEL_OUTOF10: 0

## 2025-08-27 ASSESSMENT — PAIN - FUNCTIONAL ASSESSMENT: PAIN_FUNCTIONAL_ASSESSMENT: ADULT NONVERBAL PAIN SCALE (NPVS)

## 2025-08-28 ASSESSMENT — PAIN - FUNCTIONAL ASSESSMENT: PAIN_FUNCTIONAL_ASSESSMENT: 0-10

## 2025-08-28 ASSESSMENT — PAIN SCALES - GENERAL: PAINLEVEL_OUTOF10: 0
